# Patient Record
Sex: FEMALE | Race: BLACK OR AFRICAN AMERICAN | Employment: UNEMPLOYED | URBAN - METROPOLITAN AREA
[De-identification: names, ages, dates, MRNs, and addresses within clinical notes are randomized per-mention and may not be internally consistent; named-entity substitution may affect disease eponyms.]

---

## 2017-01-27 ENCOUNTER — ANESTHESIA EVENT (OUTPATIENT)
Dept: ENDOSCOPY | Age: 57
End: 2017-01-27
Payer: MEDICAID

## 2017-01-30 ENCOUNTER — ANESTHESIA (OUTPATIENT)
Dept: ENDOSCOPY | Age: 57
End: 2017-01-30
Payer: MEDICAID

## 2017-01-30 PROCEDURE — 74011250636 HC RX REV CODE- 250/636

## 2017-01-30 PROCEDURE — 74011000250 HC RX REV CODE- 250

## 2017-01-30 RX ORDER — SODIUM CHLORIDE, SODIUM LACTATE, POTASSIUM CHLORIDE, CALCIUM CHLORIDE 600; 310; 30; 20 MG/100ML; MG/100ML; MG/100ML; MG/100ML
INJECTION, SOLUTION INTRAVENOUS
Status: DISCONTINUED | OUTPATIENT
Start: 2017-01-30 | End: 2017-01-30 | Stop reason: HOSPADM

## 2017-01-30 RX ORDER — LIDOCAINE HYDROCHLORIDE 20 MG/ML
INJECTION, SOLUTION EPIDURAL; INFILTRATION; INTRACAUDAL; PERINEURAL AS NEEDED
Status: DISCONTINUED | OUTPATIENT
Start: 2017-01-30 | End: 2017-01-30 | Stop reason: HOSPADM

## 2017-01-30 RX ORDER — PROPOFOL 10 MG/ML
INJECTION, EMULSION INTRAVENOUS AS NEEDED
Status: DISCONTINUED | OUTPATIENT
Start: 2017-01-30 | End: 2017-01-30 | Stop reason: HOSPADM

## 2017-01-30 RX ADMIN — PROPOFOL 40 MG: 10 INJECTION, EMULSION INTRAVENOUS at 08:36

## 2017-01-30 RX ADMIN — PROPOFOL 30 MG: 10 INJECTION, EMULSION INTRAVENOUS at 08:39

## 2017-01-30 RX ADMIN — LIDOCAINE HYDROCHLORIDE 40 MG: 20 INJECTION, SOLUTION EPIDURAL; INFILTRATION; INTRACAUDAL; PERINEURAL at 08:32

## 2017-01-30 RX ADMIN — PROPOFOL 30 MG: 10 INJECTION, EMULSION INTRAVENOUS at 08:38

## 2017-01-30 RX ADMIN — PROPOFOL 50 MG: 10 INJECTION, EMULSION INTRAVENOUS at 08:35

## 2017-01-30 RX ADMIN — PROPOFOL 50 MG: 10 INJECTION, EMULSION INTRAVENOUS at 08:37

## 2017-01-30 RX ADMIN — SODIUM CHLORIDE, SODIUM LACTATE, POTASSIUM CHLORIDE, CALCIUM CHLORIDE: 600; 310; 30; 20 INJECTION, SOLUTION INTRAVENOUS at 08:30

## 2017-01-30 NOTE — ANESTHESIA POSTPROCEDURE EVALUATION
Post-Anesthesia Evaluation and Assessment    Patient: Remy Torres MRN: [id number]  SSN: [id number]    YOB: 1960  Age: 49 y.o. Sex: female      Data from PACU flowsheet    Cardiovascular Function/Vital Signs  Visit Vitals    /51    Pulse 99    Temp 36.3 °C (97.3 °F)    Resp 20    Ht 5' 4\" (1.626 m)    Wt 140.2 kg (309 lb)    SpO2 98%    Breastfeeding No    BMI 53.04 kg/m2       Patient is status post anesthesia    Nausea/Vomiting: controlled    Postoperative hydration reviewed and adequate. Pain:  Managed    Neurological Status: At baseline    Mental Status and Level of Consciousness: Alert and oriented     Pulmonary Status:   Adequate oxygenation and airway patent    Complications related to anesthesia: None    Post-anesthesia assessment completed.  No concerns    Signed By: Jael Franks CRNA     January 30, 2017

## 2017-01-30 NOTE — ANESTHESIA PREPROCEDURE EVALUATION
Anesthetic History   No history of anesthetic complications            Review of Systems / Medical History  Patient summary reviewed and pertinent labs reviewed    Pulmonary        Sleep apnea: No treatment           Neuro/Psych         Psychiatric history     Cardiovascular    Hypertension: well controlled              Exercise tolerance: >4 METS     GI/Hepatic/Renal           Liver disease (cirrhosis)     Endo/Other    Diabetes: type 2    Morbid obesity     Other Findings   Comments: Current Smoker? NO       Elective Surgery? Yes       Abstained from smoking 24 hours prior to anesthesia? N/A    Risk Factors for Postoperative nausea/vomiting:       History of postoperative nausea/vomiting? NO       Female? YES       Motion sickness? NO       Intended opioid administration for postoperative analgesia?   NO           Physical Exam    Airway  Mallampati: II  TM Distance: 4 - 6 cm  Neck ROM: normal range of motion   Mouth opening: Normal     Cardiovascular  Regular rate and rhythm,  S1 and S2 normal,  no murmur, click, rub, or gallop  Rhythm: regular  Rate: normal         Dental  No notable dental hx       Pulmonary  Breath sounds clear to auscultation               Abdominal  GI exam deferred       Other Findings            Anesthetic Plan    ASA: 3  Anesthesia type: general          Induction: Intravenous  Anesthetic plan and risks discussed with: Patient

## 2017-01-31 ENCOUNTER — OFFICE VISIT (OUTPATIENT)
Dept: HEMATOLOGY | Age: 57
End: 2017-01-31

## 2017-01-31 ENCOUNTER — HOSPITAL ENCOUNTER (OUTPATIENT)
Dept: LAB | Age: 57
Discharge: HOME | End: 2017-01-31

## 2017-01-31 VITALS
RESPIRATION RATE: 16 BRPM | SYSTOLIC BLOOD PRESSURE: 138 MMHG | BODY MASS INDEX: 50.02 KG/M2 | OXYGEN SATURATION: 99 % | HEART RATE: 97 BPM | HEIGHT: 64 IN | TEMPERATURE: 99.1 F | WEIGHT: 293 LBS | DIASTOLIC BLOOD PRESSURE: 77 MMHG

## 2017-01-31 DIAGNOSIS — K74.60 CIRRHOSIS OF LIVER WITHOUT ASCITES, UNSPECIFIED HEPATIC CIRRHOSIS TYPE (HCC): ICD-10-CM

## 2017-01-31 DIAGNOSIS — K74.60 CIRRHOSIS OF LIVER WITHOUT ASCITES, UNSPECIFIED HEPATIC CIRRHOSIS TYPE (HCC): Primary | ICD-10-CM

## 2017-01-31 PROBLEM — I10 HTN (HYPERTENSION): Status: ACTIVE | Noted: 2017-01-31

## 2017-01-31 PROBLEM — E66.9 OBESITY: Status: ACTIVE | Noted: 2017-01-31

## 2017-01-31 PROBLEM — E11.9 DIABETES MELLITUS TYPE 2, CONTROLLED (HCC): Status: ACTIVE | Noted: 2017-01-31

## 2017-01-31 LAB — AMMONIA PLAS-SCNC: 87 UMOL/L (ref 11–32)

## 2017-01-31 PROCEDURE — 82140 ASSAY OF AMMONIA: CPT | Performed by: INTERNAL MEDICINE

## 2017-01-31 PROCEDURE — 99001 SPECIMEN HANDLING PT-LAB: CPT | Performed by: INTERNAL MEDICINE

## 2017-01-31 NOTE — PROGRESS NOTES
2 Abeba Mart MD, TRICIA Littlejohn PA-C Howard Manual, MD, [address] [facility], [place] Gema Beatty NP Millicent Bussing, NP        [address]     [address] [place] [address], [address] [facility], [address]     [phone]     FAX: 411 [address], [address]     [place], [address] [place] [address] Street [address]     [phone]     FAX: [phone]           Patient Care Team:  Rand Trotter MD as PCP - General (Internal Medicine)  Brenda Frank MD (General Surgery)  Roya Way MD (Hematology and Oncology)      Problem List  Date Reviewed: 1/30/2017          Codes Class Noted    Diabetes mellitus type 2, controlled ([facility] ICD-10-CM: E11.9  ICD-9-CM: 250.00  1/31/2017        HTN (hypertension) ICD-10-CM: I10  ICD-9-CM: 401.9  1/31/2017        Obesity ICD-10-CM: E66.9  ICD-9-CM: 278.00  1/31/2017        HANKS (nonalcoholic steatohepatitis) ICD-10-CM: K75.81  ICD-9-CM: 571.8  9/25/2014        Candidal intertrigo ICD-10-CM: B37.2  ICD-9-CM: 112.3  7/28/2014        Acalculous cholecystitis ICD-10-CM: K81.9  ICD-9-CM: 575.10  2/27/2014        Cirrhosis ([facility] ICD-10-CM: K74.60  ICD-9-CM: 571.5  2/27/2014        Acquired absence of organ, genital organs ICD-10-CM: Z90.79  ICD-9-CM: V45.77  4/30/2012    Overview Signed 4/30/2012  2:32 PM by Delaney Sanford MD     [address] [facility] [place]  [address] [place] [address], [address] [place], [address] [facility]  [phone] Fax# [phone] Fax# [phone] Fax# [phone]  SURGICAL PATHOLOGY REPORT  Patient Name: Marcos Manzanares Specimen #: [id number]  Page 2 of [address] [place] [address] [facility] [address]  3.7 cm in width.  On the anterior and posterior upper uterine corpus and fundus is a 3.2 x 2.7 cm tan-yellow thickened  nodular area. Within the thickened area is one smooth-lined cyst filled with clear watery fluid measuring 0.7 cm in  diameter. The yellow thickened area grossly appears to be stromal rather than endometrial, involves approximately  20% of the uterine wall, and within 1.6 cm of the overlying serosal surface. Invasion cannot be grossly determined or  ruled out. The endometrium in the area of the thickening appears grossly attenuated. The remaining endometrium is  pink-tan, soft, and measures 0.1 to 0.2 cm in thickness. The myometrium is pink-tan, firm, slightly trabecular, and  measures up to 2.4 cm in thickness. No leiomyomas are seen. Representative sections are submitted. Block summary:  B1 anterior cervix  B2 posterior cervix  B3 anterior lower uterine corpus  B4 posterior lower uterine corpus  B5 lower anterior uterine corpus and surrounding lesion  B6 upper anterior uterine corpus, including lesion with cyst  B7 upper posterior uterine corpus  B8 upper posterior uterine corpus, including thickest area of lesion  (mh)  VIDAL/mj1  MICROSCOPIC DESCRIPTION:  A. Sections show some small fragments of benign endometrial tissue in a background of abundant blood. A few  small fragments of benign stromal tissue are included, together with fragments of benign squamous mucosa. There  are no specific features or evidence of malignancy. B. Sections show an infarcted leiomyoma, which appears to be located predominantly within the submucosal tissues,  extending to the endometrial surface. Noted within the surrounding myometrial tissues are numerous blood vessels  containing eosinophilic material within the lumens, this material being associated with a focal multinucleated giant  cell response. These findings may be consistent with prior embolization. Where preserved, the remaining  endometrium shows a weakly proliferative pattern.  Focal adenomyosis is also seen within the uterine wall. The             Bacterial vaginitis ICD-10-CM: N76.0, B96.89  ICD-9-CM: 616.10, 041.9  8/31/2011            The physicians listed above have asked me to see Jorje Delong in consultation regarding management of HANKS. MELD is 15 and her GI Dr. Melissa Luna asked her to be seen by us for consideration of OLT. Patient is morbidly obese. BMI is 53. She has b/l degenerative joint disease in both knees. She is not a knee replacement candidate due to thrombocytopenia and cirrhosis. She has limited mobility due to her knees and believes she would lose weight if her knees were improved. She does not take pain pills due to fear of harming her liver. She presents with her daughter today. All medical records sent by the referring physicians were reviewed including imaging studies . The patient is a 49 y.o. female who was first found to have chronic liver disease in 2011. She has biopsy proven HANKS . The patient has not developed any major complications of cirrhosis to date. She has a history of small varices, but last EGD from 30 Jan 2017 was negative per her report. The patient has not developed ascites. The patient has not developed edema. The patient has had falls and confusion, but does not know if this was due to hepatic encephalopathy. The patient notes fatigue, pain in the right side over the liver, and problems concentrating,     The patient has limitations in functional activities secondary to these symptoms. ALLERGIES  No Known Allergies    MEDICATIONS  Current Outpatient Prescriptions   Medication Sig    metFORMIN (GLUCOPHAGE) 500 mg tablet Take 500 mg by mouth two (2) times daily (with meals).  SITagliptin (JANUVIA) 100 mg tablet Take 100 mg by mouth daily.  lisinopril (PRINIVIL, ZESTRIL) 20 mg tablet Take 20 mg by mouth daily.     clotrimazole (LOTRIMIN) 1 % topical cream Apply a thin layer over affected area twice daily for up to 14 days.    gabapentin (NEURONTIN) 300 mg capsule Take 300 mg by mouth daily. No current facility-administered medications for this visit. SYSTEM REVIEW NOT RELATED TO LIVER DISEASE OR REVIEWED ABOVE:  Constitution systems: Negative for fever, chills, weight gain, weight loss. Eyes: Negative for visual changes. ENT: Negative for sore throat, painful swallowing. Respiratory: Negative for cough, hemoptysis, SOB. Cardiology: Negative for chest pain, palpitations. GI:  Negative for constipation or diarrhea. : Negative for urinary frequency, dysuria, hematuria, nocturia. Skin: Negative for rash. Hematology: Negative for easy bruising, blood clots. Musculo-skelatal: Negative for back pain, muscle pain, weakness. Neurologic: Negative for headaches, dizziness, vertigo, memory problems not related to HE. Psychology: Negative for anxiety, depression. FAMILY HISTORY:  There is no family history of liver disease. SOCIAL HISTORY:  The patient is . The patient has 6 children  The patient has never used tobacco products. The patient has never consumed significant amounts of alcohol. The patient currently receiving disability. PHYSICAL EXAMINATION:  Visit Vitals    /77 (BP 1 Location: Right arm, BP Patient Position: Sitting)    Pulse 97    Temp 99.1 °F (37.3 °C) (Tympanic)    Resp 16    Ht 5' 4\" (1.626 m)    Wt 309 lb (140.2 kg)    LMP 04/25/2011    SpO2 99%    BMI 53.04 kg/m2     General: No acute distress. Eyes: Sclera mildly icteric. ENT: No oral lesions. Thyroid normal.  Nodes: No adenopathy. Skin: + spider angiomata. No jaundice. No palmar erythema. Respiratory: Lungs clear to auscultation. Cardiovascular: Regular heart rate. No murmurs. No JVD. Abdomen: Obese. Soft non-tender. Liver size normal to percussion/palpation. Spleen not palpable. No obvious ascites. Extremities: Trace edema. No muscle wasting.   No gross arthritic changes. Neurologic: Alert and oriented. Cranial nerves grossly intact. Mild tremor with outstretched hands. LABORATORY STUDIES:  Liver [place] [facility] 1/30/2017 11/23/2016   WBC 4.6 - 13.2 K/uL 2.9 (L) 4.9   ANC 1.8 - 8.0 K/UL 1.4 (L) 3.3   HGB 12.0 - 16.0 g/dL 12.8 13.7    - 420 K/uL 41 (L) 41 (L)   INR 0.8 - 1.2       AST 15 - 37 U/L     ALT 13 - 56 U/L     Alk Phos 45 - 117 U/L     Bili, Total 0.2 - 1.0 MG/DL     Bili, Direct 0.0 - 0.2 MG/DL     Albumin 3.4 - 5.0 g/dL     BUN 7.0 - 18 MG/DL  5 (L)   Creat 0.6 - 1.3 MG/DL  0.79   Na 136 - 145 mmol/L  137   K 3.5 - 5.5 mmol/L  4.2   Cl 100 - 108 mmol/L  102   CO2 21 - 32 mmol/L  26   Glucose 74 - 99 mg/dL  363 (H)   Magnesium 1.8 - 2.4 mg/dL  1.9     SEROLOGIES:  Not available or performed. Testing will be performed as indicated. LIVER HISTOLOGY:  Not available or performed    ENDOSCOPIC PROCEDURES:  Not available or performed    RADIOLOGY:  11/2016 US  1. Increased echogenicity with nodular liver echotexture, compatible with  reported hepatic cirrhosis. No suspicious hepatic mass.     2. Cholelithiasis without evidence of acute cholecystitis.     Attending radiologist statement: I have personally reviewed the study and this  report, and concur with the above stated findings. OTHER TESTING:  Not available or performed    ASSESSMENT AND PLAN:    Cirrhosis secondary to HANKS. Compensated. MELD 15. The patient has depressed but stable liver function. The patient has never developed any complications of cirrhosis to date. The patient has significant symptoms from cirrhosis despite having a MELD score of 15. I am afraid her obesity may preclude her from liver transplant. I will discuss her case with transplant center. Perhaps there is a program she could be a part of to help her with aggressive weight loss.     Perhaps there is a way she could have orthopedic surgery in a transplant center environment, but I think the risks may be too great. 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 EGD is being performed on a regular basis by GI Dr. Mireille Lind. The patient was directed to continue all current medications at the current dosages. There are no contraindications for the patient to take any medications that are necessary for treatment of other medical issues. Thrombocytopenia secondary to cirrhosis. There is no evidence of overt bleeding. There is no indication for platelet transfusions or pharmacologic treatment to increase the platelet count. [address] screening has recently been performed and does not suggest [address] The next liver imaging study will be performed in May 2017. All of the above issues were discussed with the patient. All questions were answered. The patient expressed a clear understanding of the above. [address] [address] in 3 months.                 Andreia Sanchez MD  Liver [place] of [address], [address] [facility], [address]  [phone]  FAX: [phone]

## 2017-01-31 NOTE — MR AVS SNAPSHOT
Visit Information Date & Time Provider Department Dept. Phone Encounter #  
 1/31/2017  1:00 PM Mark Llanes MD [address] [id number] Your Appointments 5/1/2017  2:30 PM  
Follow Up with Michelle Valdivia NP Liver [place] [facility] [place] ([facility]) Appt Note: HANKS/Cirrhosis [address] Ruthanna Blade [address] [facility]  
  
   
 [address] [facility] Upcoming Health Maintenance Date Due Pneumococcal 19-64 Highest Risk (1 of 3 - PCV13) 10/25/1981 DTaP/Tdap/Td series (1 - Tdap) 10/25/1983 BREAST CANCER SCRN MAMMOGRAM 10/25/2012 FOBT Q 1 YEAR AGE 50-75 10/25/2012 PAP AKA CERVICAL CYTOLOGY 4/30/2015 INFLUENZA AGE 9 TO ADULT 8/1/2016 Allergies as of 1/31/2017  Review Complete On: 1/31/2017 By: Nish Rodriguez No Known Allergies Current Immunizations  Never Reviewed No immunizations on file. Not reviewed this visit You Were Diagnosed With   
  
 Codes Comments Cirrhosis of liver without ascites, unspecified hepatic cirrhosis type ([facility]    -  Primary ICD-10-CM: K74.60 ICD-9-CM: 571.5 Vitals BP Pulse Temp Resp Height(growth percentile) Weight(growth percentile) 138/77 (BP 1 Location: Right arm, BP Patient Position: Sitting) 97 99.1 °F (37.3 °C) (Tympanic) 16 5' 4\" (1.626 m) 309 lb (140.2 kg) LMP SpO2 BMI OB Status Smoking Status 04/25/2011 99% 53.04 kg/m2 Hysterectomy Never Smoker Vitals History BMI and BSA Data Body Mass Index Body Surface Area 53.04 kg/m 2 2.52 m 2 Your Updated Medication List  
  
   
This list is accurate as of: 1/31/17  2:13 PM.  Always use your most recent med list.  
  
  
  
  
 clotrimazole 1 % topical cream  
Commonly known as:  Danielle Alina Apply a thin layer over affected area twice daily for up to 14 days. gabapentin 300 mg capsule Commonly known as:  NEURONTIN Take 300 mg by mouth daily. JANUVIA 100 mg tablet Generic drug:  SITagliptin Take 100 mg by mouth daily. lisinopril 20 mg tablet Commonly known as:  Donnald Code Take 20 mg by mouth daily. metFORMIN 500 mg tablet Commonly known as:  GLUCOPHAGE Take 500 mg by mouth two (2) times daily (with meals). To-Do List   
 01/31/2017 Lab:  AFP WITH AFP-L3% 01/31/2017 Lab:  AMMONIA   
  
 01/31/2017 Lab:  CBC WITH AUTOMATED DIFF   
  
 01/31/2017 Lab:  HEPATIC FUNCTION PANEL   
  
 01/31/2017 Lab:  METABOLIC PANEL, BASIC   
  
 01/31/2017 Lab:  PROTHROMBIN TIME + INR Introducing [facility]! Lisa Adkins introduces [employer]plane patient portal. Now you can access parts of your medical record, email your doctor's office, and request medication refills online. 1. In your internet browser, go to [email or web address] 2. Click on the First Time User? Click Here link in the Sign In box. You will see the New Member Sign Up page. 3. Enter your [employer]plane Access Code exactly as it appears below. You will not need to use this code after youve completed the sign-up process. If you do not sign up before the expiration date, you must request a new code. · [employer] Air[employer] Access Code: [email or web address] Expires: 2/20/2017 12:20 PM 
 
4. Enter the last four digits of your Social Security Number (xxxx) and Date of Birth (mm/dd/yyyy) as indicated and click Submit. You will be taken to the next sign-up page. 5. Create a Project Airplane ID. This will be your [employer] Airplane login ID and cannot be changed, so think of one that is secure and easy to remember. 6. Create a [employer] password. You can change your password at any time. 7. Enter your Password Reset Question and Answer. This can be used at a later time if you forget your password. 8. Enter your e-mail address.  You will receive e-mail notification when new information is available in marinanow. 9. Click Sign Up. You can now view and download portions of your medical record. 10. Click the Download Summary menu link to download a portable copy of your medical information. If you have questions, please visit the Frequently Asked Questions section of the marinanow website. Remember, marinanow is NOT to be used for urgent needs. For medical emergencies, dial 911. Now available from your iPhone and Android! Please provide this summary of care documentation to your next provider. Your primary care clinician is listed as Margo Rayo. If you have any questions after today's visit, please call [phone].

## 2017-02-01 LAB
AFP L3 MFR SERPL: 8.9 % (ref 0–9.9)
AFP SERPL-MCNC: 3.4 NG/ML (ref 0–8)
ALBUMIN SERPL-MCNC: 3.1 G/DL (ref 3.5–5.5)
ALP SERPL-CCNC: 123 IU/L (ref 39–117)
ALT SERPL-CCNC: 25 IU/L (ref 0–32)
AMMONIA PLAS-MCNC: NORMAL UG/DL (ref 27–102)
AST SERPL-CCNC: 48 IU/L (ref 0–40)
BASOPHILS # BLD AUTO: 0 X10E3/UL (ref 0–0.2)
BASOPHILS NFR BLD AUTO: 1 %
BILIRUB DIRECT SERPL-MCNC: 1.01 MG/DL (ref 0–0.4)
BILIRUB SERPL-MCNC: 3.9 MG/DL (ref 0–1.2)
BUN SERPL-MCNC: 5 MG/DL (ref 6–24)
BUN/CREAT SERPL: 11 (ref 9–23)
CALCIUM SERPL-MCNC: 8.6 MG/DL (ref 8.7–10.2)
CHLORIDE SERPL-SCNC: 101 MMOL/L (ref 96–106)
CO2 SERPL-SCNC: 23 MMOL/L (ref 18–29)
CREAT SERPL-MCNC: 0.45 MG/DL (ref 0.57–1)
EOSINOPHIL # BLD AUTO: 0.1 X10E3/UL (ref 0–0.4)
EOSINOPHIL NFR BLD AUTO: 3 %
ERYTHROCYTE [DISTWIDTH] IN BLOOD BY AUTOMATED COUNT: 16.7 % (ref 12.3–15.4)
GLUCOSE SERPL-MCNC: 226 MG/DL (ref 65–99)
HCT VFR BLD AUTO: 41 % (ref 34–46.6)
HGB BLD-MCNC: 13.9 G/DL (ref 11.1–15.9)
IMM GRANULOCYTES # BLD: 0 X10E3/UL (ref 0–0.1)
IMM GRANULOCYTES NFR BLD: 0 %
INR PPP: 1.4 (ref 0.8–1.2)
LYMPHOCYTES # BLD AUTO: 0.9 X10E3/UL (ref 0.7–3.1)
LYMPHOCYTES NFR BLD AUTO: 28 %
MCH RBC QN AUTO: 34.8 PG (ref 26.6–33)
MCHC RBC AUTO-ENTMCNC: 33.9 G/DL (ref 31.5–35.7)
MCV RBC AUTO: 103 FL (ref 79–97)
MONOCYTES # BLD AUTO: 0.3 X10E3/UL (ref 0.1–0.9)
MONOCYTES NFR BLD AUTO: 10 %
MORPHOLOGY BLD-IMP: ABNORMAL
NEUTROPHILS # BLD AUTO: 1.9 X10E3/UL (ref 1.4–7)
NEUTROPHILS NFR BLD AUTO: 58 %
PLATELET # BLD AUTO: 54 X10E3/UL (ref 150–379)
POTASSIUM SERPL-SCNC: 4.3 MMOL/L (ref 3.5–5.2)
PROT SERPL-MCNC: 6.7 G/DL (ref 6–8.5)
PROTHROMBIN TIME: 14 SEC (ref 9.1–12)
RBC # BLD AUTO: 3.99 X10E6/UL (ref 3.77–5.28)
SODIUM SERPL-SCNC: 138 MMOL/L (ref 134–144)
WBC # BLD AUTO: 3.2 X10E3/UL (ref 3.4–10.8)

## 2017-02-01 RX ORDER — LACTULOSE 10 G/15ML
10 SOLUTION ORAL; RECTAL
Qty: 480 ML | Refills: 3 | Status: SHIPPED | OUTPATIENT
Start: 2017-02-01 | End: 2017-05-23

## 2017-02-02 DIAGNOSIS — E66.01 MORBID OBESITY, UNSPECIFIED OBESITY TYPE (HCC): Primary | ICD-10-CM

## 2017-02-07 ENCOUNTER — TELEPHONE (OUTPATIENT)
Dept: HEMATOLOGY | Age: 57
End: 2017-02-07

## 2017-02-07 NOTE — TELEPHONE ENCOUNTER
Pt would like to know if you spoke w/ weight loss surgeon? She mentioned something about a committee, I'm not really sure she seem a bit confused.

## 2017-05-19 ENCOUNTER — APPOINTMENT (OUTPATIENT)
Dept: GENERAL RADIOLOGY | Age: 57
DRG: 279 | End: 2017-05-19
Attending: EMERGENCY MEDICINE
Payer: MEDICAID

## 2017-05-19 ENCOUNTER — APPOINTMENT (OUTPATIENT)
Dept: CT IMAGING | Age: 57
DRG: 279 | End: 2017-05-19
Attending: EMERGENCY MEDICINE
Payer: MEDICAID

## 2017-05-19 ENCOUNTER — HOSPITAL ENCOUNTER (INPATIENT)
Age: 57
LOS: 4 days | Discharge: HOME | DRG: 279 | End: 2017-05-23
Attending: EMERGENCY MEDICINE | Admitting: HOSPITALIST
Payer: MEDICAID

## 2017-05-19 DIAGNOSIS — E11.8 CONTROLLED TYPE 2 DIABETES MELLITUS WITH COMPLICATION, WITHOUT LONG-TERM CURRENT USE OF INSULIN (HCC): ICD-10-CM

## 2017-05-19 DIAGNOSIS — R74.01 TRANSAMINITIS: ICD-10-CM

## 2017-05-19 DIAGNOSIS — R18.8 CIRRHOSIS OF LIVER WITH ASCITES, UNSPECIFIED HEPATIC CIRRHOSIS TYPE (HCC): ICD-10-CM

## 2017-05-19 DIAGNOSIS — E83.42 HYPOMAGNESEMIA: ICD-10-CM

## 2017-05-19 DIAGNOSIS — N30.01 ACUTE CYSTITIS WITH HEMATURIA: ICD-10-CM

## 2017-05-19 DIAGNOSIS — K74.60 CIRRHOSIS OF LIVER WITH ASCITES, UNSPECIFIED HEPATIC CIRRHOSIS TYPE (HCC): ICD-10-CM

## 2017-05-19 DIAGNOSIS — D64.9 ANEMIA, UNSPECIFIED TYPE: ICD-10-CM

## 2017-05-19 DIAGNOSIS — K75.81 NASH (NONALCOHOLIC STEATOHEPATITIS): Primary | ICD-10-CM

## 2017-05-19 PROBLEM — K72.10 END STAGE LIVER DISEASE (HCC): Status: ACTIVE | Noted: 2017-05-19

## 2017-05-19 PROBLEM — R73.9 HYPERGLYCEMIA: Status: ACTIVE | Noted: 2017-05-19

## 2017-05-19 LAB
ALBUMIN SERPL BCP-MCNC: 2.2 G/DL (ref 3.4–5)
ALBUMIN/GLOB SERPL: 0.5 {RATIO} (ref 0.8–1.7)
ALP SERPL-CCNC: 142 U/L (ref 45–117)
ALT SERPL-CCNC: 27 U/L (ref 13–56)
ANION GAP BLD CALC-SCNC: 11 MMOL/L (ref 3–18)
APPEARANCE UR: CLEAR
APTT PPP: 32.2 SEC (ref 23–36.4)
AST SERPL W P-5'-P-CCNC: 46 U/L (ref 15–37)
BACTERIA URNS QL MICRO: ABNORMAL /HPF
BASOPHILS # BLD AUTO: 0 K/UL (ref 0–0.06)
BASOPHILS # BLD: 0 % (ref 0–2)
BILIRUB SERPL-MCNC: 6.6 MG/DL (ref 0.2–1)
BILIRUB UR QL: ABNORMAL
BUN SERPL-MCNC: 4 MG/DL (ref 7–18)
BUN/CREAT SERPL: 4 (ref 12–20)
CALCIUM SERPL-MCNC: 8.3 MG/DL (ref 8.5–10.1)
CHLORIDE SERPL-SCNC: 94 MMOL/L (ref 100–108)
CK MB CFR SERPL CALC: 1.5 % (ref 0–4)
CK MB SERPL-MCNC: 1.5 NG/ML (ref 5–25)
CK SERPL-CCNC: 100 U/L (ref 26–192)
CO2 SERPL-SCNC: 27 MMOL/L (ref 21–32)
COLOR UR: ABNORMAL
CREAT SERPL-MCNC: 1 MG/DL (ref 0.6–1.3)
DIFFERENTIAL METHOD BLD: ABNORMAL
EOSINOPHIL # BLD: 0.1 K/UL (ref 0–0.4)
EOSINOPHIL NFR BLD: 2 % (ref 0–5)
EPITH CASTS URNS QL MICRO: ABNORMAL /LPF (ref 0–5)
ERYTHROCYTE [DISTWIDTH] IN BLOOD BY AUTOMATED COUNT: 15.4 % (ref 11.6–14.5)
EST. AVERAGE GLUCOSE BLD GHB EST-MCNC: 192 MG/DL
GLOBULIN SER CALC-MCNC: 4.8 G/DL (ref 2–4)
GLUCOSE SERPL-MCNC: 411 MG/DL (ref 74–99)
GLUCOSE UR STRIP.AUTO-MCNC: >1000 MG/DL
HBA1C MFR BLD: 8.3 % (ref 4.2–5.6)
HCT VFR BLD AUTO: 32.5 % (ref 35–45)
HGB BLD-MCNC: 11.3 G/DL (ref 12–16)
HGB UR QL STRIP: ABNORMAL
INR PPP: 1.6 (ref 0.8–1.2)
KETONES UR QL STRIP.AUTO: NEGATIVE MG/DL
LEUKOCYTE ESTERASE UR QL STRIP.AUTO: ABNORMAL
LIPASE SERPL-CCNC: 371 U/L (ref 73–393)
LYMPHOCYTES # BLD AUTO: 19 % (ref 21–52)
LYMPHOCYTES # BLD: 1 K/UL (ref 0.9–3.6)
MAGNESIUM SERPL-MCNC: 1.5 MG/DL (ref 1.6–2.6)
MCH RBC QN AUTO: 36.5 PG (ref 24–34)
MCHC RBC AUTO-ENTMCNC: 34.8 G/DL (ref 31–37)
MCV RBC AUTO: 104.8 FL (ref 74–97)
MONOCYTES # BLD: 0.7 K/UL (ref 0.05–1.2)
MONOCYTES NFR BLD AUTO: 13 % (ref 3–10)
NEUTS SEG # BLD: 3.6 K/UL (ref 1.8–8)
NEUTS SEG NFR BLD AUTO: 66 % (ref 40–73)
NITRITE UR QL STRIP.AUTO: NEGATIVE
PH UR STRIP: 5.5 [PH] (ref 5–8)
PLATELET # BLD AUTO: 52 K/UL (ref 135–420)
PMV BLD AUTO: 9.4 FL (ref 9.2–11.8)
POTASSIUM SERPL-SCNC: 3.7 MMOL/L (ref 3.5–5.5)
PROT SERPL-MCNC: 7 G/DL (ref 6.4–8.2)
PROT UR STRIP-MCNC: ABNORMAL MG/DL
PROTHROMBIN TIME: 18.1 SEC (ref 11.5–15.2)
RBC # BLD AUTO: 3.1 M/UL (ref 4.2–5.3)
RBC #/AREA URNS HPF: ABNORMAL /HPF (ref 0–5)
SODIUM SERPL-SCNC: 132 MMOL/L (ref 136–145)
SP GR UR REFRACTOMETRY: >1.03 (ref 1–1.03)
TROPONIN I SERPL-MCNC: 0.02 NG/ML (ref 0–0.04)
UROBILINOGEN UR QL STRIP.AUTO: 4 EU/DL (ref 0.2–1)
WBC # BLD AUTO: 5.4 K/UL (ref 4.6–13.2)
WBC URNS QL MICRO: ABNORMAL /HPF (ref 0–4)

## 2017-05-19 PROCEDURE — 96365 THER/PROPH/DIAG IV INF INIT: CPT

## 2017-05-19 PROCEDURE — 65270000029 HC RM PRIVATE

## 2017-05-19 PROCEDURE — 74177 CT ABD & PELVIS W/CONTRAST: CPT

## 2017-05-19 PROCEDURE — 99285 EMERGENCY DEPT VISIT HI MDM: CPT

## 2017-05-19 PROCEDURE — P9047 ALBUMIN (HUMAN), 25%, 50ML: HCPCS | Performed by: INTERNAL MEDICINE

## 2017-05-19 PROCEDURE — 83735 ASSAY OF MAGNESIUM: CPT | Performed by: EMERGENCY MEDICINE

## 2017-05-19 PROCEDURE — 85610 PROTHROMBIN TIME: CPT | Performed by: EMERGENCY MEDICINE

## 2017-05-19 PROCEDURE — 74011250637 HC RX REV CODE- 250/637: Performed by: EMERGENCY MEDICINE

## 2017-05-19 PROCEDURE — 83036 HEMOGLOBIN GLYCOSYLATED A1C: CPT | Performed by: HOSPITALIST

## 2017-05-19 PROCEDURE — 83690 ASSAY OF LIPASE: CPT | Performed by: EMERGENCY MEDICINE

## 2017-05-19 PROCEDURE — 74011636637 HC RX REV CODE- 636/637: Performed by: EMERGENCY MEDICINE

## 2017-05-19 PROCEDURE — 74011250636 HC RX REV CODE- 250/636: Performed by: INTERNAL MEDICINE

## 2017-05-19 PROCEDURE — 74011636320 HC RX REV CODE- 636/320: Performed by: EMERGENCY MEDICINE

## 2017-05-19 PROCEDURE — 85730 THROMBOPLASTIN TIME PARTIAL: CPT | Performed by: EMERGENCY MEDICINE

## 2017-05-19 PROCEDURE — 74011250636 HC RX REV CODE- 250/636: Performed by: HOSPITALIST

## 2017-05-19 PROCEDURE — 96375 TX/PRO/DX INJ NEW DRUG ADDON: CPT

## 2017-05-19 PROCEDURE — 81001 URINALYSIS AUTO W/SCOPE: CPT | Performed by: EMERGENCY MEDICINE

## 2017-05-19 PROCEDURE — 82550 ASSAY OF CK (CPK): CPT | Performed by: EMERGENCY MEDICINE

## 2017-05-19 PROCEDURE — 80053 COMPREHEN METABOLIC PANEL: CPT | Performed by: EMERGENCY MEDICINE

## 2017-05-19 PROCEDURE — 71010 XR CHEST PORT: CPT

## 2017-05-19 PROCEDURE — 85025 COMPLETE CBC W/AUTO DIFF WBC: CPT | Performed by: EMERGENCY MEDICINE

## 2017-05-19 PROCEDURE — 93005 ELECTROCARDIOGRAM TRACING: CPT

## 2017-05-19 RX ORDER — MAGNESIUM SULFATE 100 %
4 CRYSTALS MISCELLANEOUS AS NEEDED
Status: DISCONTINUED | OUTPATIENT
Start: 2017-05-19 | End: 2017-05-23 | Stop reason: HOSPADM

## 2017-05-19 RX ORDER — ALBUMIN HUMAN 250 G/1000ML
50 SOLUTION INTRAVENOUS ONCE
Status: COMPLETED | OUTPATIENT
Start: 2017-05-19 | End: 2017-05-19

## 2017-05-19 RX ORDER — GABAPENTIN 300 MG/1
300 CAPSULE ORAL DAILY
Status: DISCONTINUED | OUTPATIENT
Start: 2017-05-20 | End: 2017-05-23 | Stop reason: HOSPADM

## 2017-05-19 RX ORDER — INSULIN LISPRO 100 [IU]/ML
INJECTION, SOLUTION INTRAVENOUS; SUBCUTANEOUS
Status: DISCONTINUED | OUTPATIENT
Start: 2017-05-19 | End: 2017-05-20

## 2017-05-19 RX ORDER — METOPROLOL TARTRATE 5 MG/5ML
2.5 INJECTION INTRAVENOUS ONCE
Status: DISCONTINUED | OUTPATIENT
Start: 2017-05-19 | End: 2017-05-19

## 2017-05-19 RX ORDER — DEXTROSE MONOHYDRATE 25 G/50ML
25-50 INJECTION, SOLUTION INTRAVENOUS AS NEEDED
Status: DISCONTINUED | OUTPATIENT
Start: 2017-05-19 | End: 2017-05-23 | Stop reason: HOSPADM

## 2017-05-19 RX ORDER — ONDANSETRON 2 MG/ML
6 INJECTION INTRAMUSCULAR; INTRAVENOUS
Status: DISCONTINUED | OUTPATIENT
Start: 2017-05-19 | End: 2017-05-23 | Stop reason: HOSPADM

## 2017-05-19 RX ORDER — LANOLIN ALCOHOL/MO/W.PET/CERES
400 CREAM (GRAM) TOPICAL
Status: COMPLETED | OUTPATIENT
Start: 2017-05-19 | End: 2017-05-19

## 2017-05-19 RX ORDER — MORPHINE SULFATE 2 MG/ML
2 INJECTION, SOLUTION INTRAMUSCULAR; INTRAVENOUS ONCE
Status: COMPLETED | OUTPATIENT
Start: 2017-05-19 | End: 2017-05-19

## 2017-05-19 RX ORDER — SODIUM CHLORIDE 0.9 % (FLUSH) 0.9 %
5-10 SYRINGE (ML) INJECTION AS NEEDED
Status: DISCONTINUED | OUTPATIENT
Start: 2017-05-19 | End: 2017-05-23 | Stop reason: HOSPADM

## 2017-05-19 RX ORDER — LISINOPRIL 20 MG/1
20 TABLET ORAL DAILY
Status: DISCONTINUED | OUTPATIENT
Start: 2017-05-20 | End: 2017-05-23 | Stop reason: HOSPADM

## 2017-05-19 RX ADMIN — ALBUMIN (HUMAN) 25 G: 0.25 INJECTION, SOLUTION INTRAVENOUS at 20:07

## 2017-05-19 RX ADMIN — Medication 400 MG: at 16:16

## 2017-05-19 RX ADMIN — MORPHINE SULFATE 2 MG: 2 INJECTION, SOLUTION INTRAMUSCULAR; INTRAVENOUS at 21:06

## 2017-05-19 RX ADMIN — INSULIN HUMAN 6 UNITS: 100 INJECTION, SOLUTION PARENTERAL at 15:22

## 2017-05-19 RX ADMIN — IOPAMIDOL 90 ML: 612 INJECTION, SOLUTION INTRAVENOUS at 15:34

## 2017-05-19 RX ADMIN — ALBUMIN (HUMAN) 25 G: 0.25 INJECTION, SOLUTION INTRAVENOUS at 19:07

## 2017-05-19 NOTE — ED NOTES
Purposeful rounding completed:    Side rails up x 2:  YES  Bed in low position and wheels locked: YES  Call bell within reach: YES  Comfort addressed: YES    Toileting needs addressed: YES  Plan of care reviewed/updated with patient and or family members: YES  IV site assessed: YES  Pain assessed and addressed: YES, 6    Patient provided with meal box at this time per verbal okay by Dr. Saranya Awad

## 2017-05-19 NOTE — ED TRIAGE NOTES
Pt reports dizziness since this AM AND bleeding ( currently controlled) from the umbilicus. H/o cirrhosis.

## 2017-05-19 NOTE — ED NOTES
The Sepsis Screening has been completed on arrival in the Emergency Department. Vital signs:  Patient Vitals for the past 4 hrs:   Temp Pulse Resp BP SpO2   05/19/17 1615 - - - 115/44 -   05/19/17 1548 - - - - 100 %   05/19/17 1520 - - - - 100 %   05/19/17 1518 - - - 109/65 -   05/19/17 1416 - - - - 100 %   05/19/17 1415 - - - 106/74 -   05/19/17 1329 98.9 °F (37.2 °C) (!) 111 16 147/51 99 %       MAP (Monitor): (!) 57    =monitored (data validate)  MAP (Calculated): 83    =calculated (manual entry)    Is patient is 31y/o or older, meets 2 or more ABNORMAL VITAL SIGNS below, associated with SUSPECTED INFECTION?   NO  Dr. Miriam Tinsley notified about patients VS. Patient has end stage liver disease with no suspected infection     Temperature < 96.8°F (36°C) or > 100.9°F (38.3°C)   Heart Rate > 90 beats per minute   Respiratory Rate > 20 beats per minute   BP < 90 systolic    MAP < 65    IF ANSWER IS YES, CALL A CODE SEPSIS  POC LACTIC ACID ASAP AND BEGIN NURSE DRIVEN SEPSIS ORDERS WHILE AWAITING PROVIDER

## 2017-05-19 NOTE — IP AVS SNAPSHOT
[address] [place] [address] 
[phone] Patient: Teofilo Mendes MRN: [id number] :1960 You are allergic to the following No active allergies Recent Documentation Height Weight BMI OB Status Smoking Status 1.626 m 134.3 kg 50.81 kg/m2 Hysterectomy Never Smoker Emergency Contacts Name Discharge Info Relation Home Work Mobile Roseanne Figueroa DISCHARGE CAREGIVER [3] Daughter [21]   [phone] [address]  Child [2] [phone] About your hospitalization You were admitted on:  May 19, 2017 You last received care in the:  [facility] 2 NEURO MED You were discharged on:  May 23, 2017 Unit phone number:  [phone] Why you were hospitalized Your primary diagnosis was:  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) Your diagnoses also included:  Hyperglycemia, Ascites, End Stage Liver Disease (Hcc), Diabetes Mellitus Type 2, Controlled (Hcc), Htn (Hypertension), Obesity Providers Seen During Your Hospitalizations Provider Role Specialty Primary office phone Jessica Chambers MD Attending Provider Emergency Medicine [phone] Angeles Cobos MD Attending Provider [facility] [phone] Katy Sun MD Attending Provider Internal Medicine [phone] Torito Munoz MD Attending Provider Internal Medicine [phone] Your Primary Care Physician (PCP) Primary Care Physician Office Phone Office Fax Leah Montague [phone] Follow-up Information Follow up With Details Comments Contact Info Kevin Hook MD In 1 week  [address] [place] [address] [address] [phone] Marcy Saunders MD In 1 week Gastrointerology will arrange appointment with Dr. Chyna Arnold [facility] [address] [facility] [address] [phone] Current Discharge Medication List  
  
START taking these medications Dose & Instructions Dispensing Information Comments Morning Noon Evening Bedtime  
 potassium chloride SR 10 mEq tablet Commonly known as:  KLOR-CON 10 Your last dose was: Your next dose is:    
   
   
 Dose:  10 mEq Take 1 Tab by mouth daily. Quantity:  30 Tab Refills:  0  
     
   
   
   
  
 spironolactone 50 mg tablet Commonly known as:  ALDACTONE Your last dose was: Your next dose is:    
   
   
 Dose:  50 mg Take 1 Tab by mouth daily. Quantity:  30 Tab Refills:  0 CONTINUE these medications which have CHANGED Dose & Instructions Dispensing Information Comments Morning Noon Evening Bedtime  
 lactulose 10 gram/15 mL solution Commonly known as:  Sugar Vicky What changed:   
- medication strength 
- how much to take - when to take this Your last dose was: Your next dose is:    
   
   
 Dose:  20 g Take 30 mL by mouth three (3) times daily. Quantity:  1 Bottle Refills:  2 CONTINUE these medications which have NOT CHANGED Dose & Instructions Dispensing Information Comments Morning Noon Evening Bedtime  
 clotrimazole 1 % topical cream  
Commonly known as:  Jael Ort Your last dose was: Your next dose is:    
   
   
 Apply a thin layer over affected area twice daily for up to 14 days. Quantity:  15 g Refills:  2  
     
   
   
   
  
 gabapentin 300 mg capsule Commonly known as:  NEURONTIN Your last dose was: Your next dose is:    
   
   
 Dose:  300 mg Take 300 mg by mouth daily. Refills:  0 JANUVIA 100 mg tablet Generic drug:  SITagliptin Your last dose was: Your next dose is:    
   
   
 Dose:  100 mg Take 100 mg by mouth daily. Refills:  0  
     
   
   
   
  
 lisinopril 20 mg tablet Commonly known as:  Bigg Bold Your last dose was: Your next dose is:    
   
   
 Dose:  20 mg Take 20 mg by mouth daily. Refills:  0  
     
   
   
   
  
 metFORMIN 500 mg tablet Commonly known as:  GLUCOPHAGE Your last dose was: Your next dose is:    
   
   
 Dose:  500 mg Take 500 mg by mouth two (2) times daily (with meals). Refills:  0 Where to Get Your Medications Information on where to get these meds will be given to you by the nurse or doctor. ! Ask your nurse or doctor about these medications  
  lactulose 10 gram/15 mL solution  
 potassium chloride SR 10 mEq tablet  
 spironolactone 50 mg tablet Discharge Instructions DISCHARGE SUMMARY from Nurse The following personal items are in your possession at time of discharge: 
 
Dental Appliances: None Visual Aid: None Home Medications: None Jewelry: Sent home Clothing: Dress, Undergarments Other Valuables: Cell Phone, Graciela PATIENT INSTRUCTIONS: 
 
 
F-face looks uneven A-arms unable to move or move unevenly S-speech slurred or non-existent T-time-call 911 as soon as signs and symptoms begin-DO NOT go Back to bed or wait to see if you get better-TIME IS BRAIN. Warning Signs of HEART ATTACK Call 911 if you have these symptoms: 
? Chest discomfort.  Most heart attacks involve discomfort in the center of the chest that lasts more than a few minutes, or that goes away and comes back. It can feel like uncomfortable pressure, squeezing, fullness, or pain. ? Discomfort in other areas of the upper body. Symptoms can include pain or discomfort in one or both arms, the back, neck, jaw, or stomach. ? Shortness of breath with or without chest discomfort. ? Other signs may include breaking out in a cold sweat, nausea, or lightheadedness. Don't wait more than five minutes to call [address]! Fast action can save your life. Calling 911 is almost always the fastest way to get lifesaving treatment. Emergency Medical Services staff can begin treatment when they arrive  up to an hour sooner than if someone gets to the hospital by car. The discharge information has been reviewed with the patient. The patient verbalized understanding. Discharge medications reviewed with the patient and appropriate educational materials and side effects teaching were provided. Diabetes Foot Health: Care Instructions Your Care Instructions When you have diabetes, your feet need extra care and attention. Diabetes can damage the nerve endings and blood vessels in your feet, making you less likely to notice when your feet are injured. Diabetes also limits your body's ability to fight infection and get blood to areas that need it. If you get a minor foot injury, it could become an ulcer or a serious infection. With good foot care, you can prevent most of these problems. Caring for your feet can be quick and easy. Most of the care can be done when you are bathing or getting ready for bed. Follow-up care is a key part of your treatment and safety. Be sure to make and go to all appointments, and call your doctor if you are having problems. Its also a good idea to know your test results and keep a list of the medicines you take. How can you care for yourself at home?  
· Keep your blood sugar close to normal by watching what and how much you eat, monitoring blood sugar, taking medicines if prescribed, and getting regular exercise. · Do not smoke. Smoking affects blood flow and can make foot problems worse. If you need help quitting, talk to your doctor about stop-smoking programs and medicines. These can increase your chances of quitting for good. · Eat a diet that is low in fats. High fat intake can cause fat to build up in your blood vessels and decrease blood flow. · Inspect your feet daily for blisters, cuts, cracks, or sores. If you cannot see well, use a mirror or have someone help you. · Take care of your feet: 
[facility] AUTHORITY your feet every day. Use warm (not hot) water. Check the water temperature with your wrists or other part of your body, not your feet. ¨ Dry your feet well. Pat them dry. Do not rub the skin on your feet too hard. Dry well between your toes. If the skin on your feet stays moist, bacteria or a fungus can grow, which can lead to infection. ¨ Keep your skin soft. Use moisturizing skin cream to keep the skin on your feet soft and prevent calluses and cracks. But do not put the cream between your toes, and stop using any cream that causes a rash. ¨ Clean underneath your toenails carefully. Do not use a sharp object to clean underneath your toenails. Use the blunt end of a nail file or other rounded tool. ¨ Trim and file your toenails straight across to prevent ingrown toenails. Use a nail clipper, not scissors. Use an emery board to smooth the edges. · Change socks daily. Socks without seams are best, because seams often rub the feet. You can find socks for people with diabetes from specialty catalogs. · Look inside your shoes every day for things like gravel or torn linings, which could cause blisters or sores. · Buy shoes that fit well: 
¨ Look for shoes that have plenty of space around the toes. This helps prevent bunions and blisters. ¨ Try on shoes while wearing the kind of socks you will usually wear with the shoes. ¨ Avoid plastic shoes. They may rub your feet and cause blisters. Good shoes should be made of materials that are flexible and breathable, such as leather or cloth. ¨ Break in new shoes slowly by wearing them for no more than an hour a day for several days. Take extra time to check your feet for red areas, blisters, or other problems after you wear new shoes. · Do not go barefoot. Do not wear sandals, and do not wear shoes with very thin soles. Thin soles are easy to puncture. They also do not protect your feet from hot pavement or cold weather. · Have your doctor check your feet during each visit. If you have a foot problem, see your doctor. Do not try to treat an early foot problem at home. Home remedies or treatments that you can buy without a prescription (such as corn removers) can be harmful. · Always get early treatment for foot problems. A minor irritation can lead to a major problem if not properly cared for early. When should you call for help? Call your doctor now or seek immediate medical care if: 
· You have a foot sore, an ulcer or break in the skin that is not healing after 4 days, bleeding corns or calluses, or an ingrown toenail. · You have blue or black areas, which can mean bruising or blood flow problems. · You have peeling skin or tiny blisters between your toes or cracking or oozing of the skin. · You have a fever for more than 24 hours and a foot sore. · You have new numbness or tingling in your feet that does not go away after you move your feet or change positions. · You have unexplained or unusual swelling of the foot or ankle. Watch closely for changes in your health, and be sure to contact your doctor if: 
· You cannot do proper foot care. Where can you learn more? Go to http://adela-santhosh.info/. Enter A739 in the search box to learn more about \"Diabetes Foot Health: Care Instructions. \" Current as of: May 23, 2016 Content Version: 11.2 © 3585-6979 Healthwise, Incorporated. Care instructions adapted under license by [employer] (which disclaims liability or warranty for this information). If you have questions about a medical condition or this instruction, always ask your healthcare professional. [address] any warranty or liability for your use of this information. Diabetes and Preventing Falls: Care Instructions Your Care Instructions If you are an older adult who has diabetes, you may have a higher risk of falling. Complications of diabetessuch as nerve damage, foot problems, and reduced visionmay increase your risk of a fall. Some of your medicines also may add to your risk. By making your home safer, you can lower your risk of falling. Doing things to prevent diabetes complications may also help to lower your risk. You can make your home safer with a few simple measures. Follow-up care is a key part of your treatment and safety. Be sure to make and go to all appointments, and call your doctor if you are having problems. It's also a good idea to know your test results and keep a list of the medicines you take. How can you care for yourself at home? Taking care of yourself · Keep your blood sugar at a target level (which you set with your doctor). · Exercise regularly to improve your strength, muscle tone, and balance. Walk if you can. Swimming may be a good choice if you cannot walk easily. · Have your vision checked as often as your doctor recommends. It is usually once a year or more often if you have eye problems. · Know the side effects of the medicines you take. Ask your doctor or pharmacist whether the medicines you take can affect your balance. Sleeping pills or sedatives can affect your balance. · Limit the amount of alcohol you drink. Alcohol can impair your balance and other senses. · Have your doctor check your feet during each visit.  If you have a foot problem, see your doctor. Preventing falls at home · Remove raised doorway thresholds, throw rugs, and clutter. Repair loose carpet or raised areas in the floor. · Move furniture and electrical cords to keep them out of walking paths. · Use nonskid floor wax, and wipe up spills right away, especially on ceramic tile floors. · If you use a walker or cane, put rubber tips on it. If you use crutches, clean the bottoms of them regularly with an abrasive pad, such as steel wool. · Keep your house well lit, especially Graciella Sandifer, and outside walkways. Use night-lights in areas such as hallways and bathrooms. Add extra light switches or use remote switches (such as switches that go on or off when you clap your hands) to make it easier to turn lights on if you have to get up during the night. · Install sturdy handrails on stairways. Put grab bars near your shower, bathtub, and toilet. · Store household items on low shelves so that you do not have to climb or reach high. Or use a reaching device that you can get at a medical supply store. If you have to climb for something, use a step stool with handrails, or ask someone to get it for you. · Keep a cordless phone and a flashlight with new batteries by your bed. If possible, put a phone in each of the main rooms of your house, or carry a cell phone in case you fall and cannot reach a phone. Or you can wear a device around your neck or wrist. You push a button that sends a signal for help. · Wear low-heeled shoes that fit well and give your feet good support. Use footwear with nonskid soles. Check the heels and soles of your shoes for wear. Repair or replace worn heels or soles. · Do not wear socks without shoes on wood floors. · Walk on the grass when the sidewalks are slippery. If you live in an area that gets snow and ice in the winter, sprinkle salt on slippery steps and sidewalks. Where can you learn more? Go to http://adela-santhosh.info/. Enter O995 in the search box to learn more about \"Diabetes and Preventing Falls: Care Instructions. \" Current as of: August 4, 2016 Content Version: 11.2 © 0242-2777 "[employer]". Care instructions adapted under license by [employer] (which disclaims liability or warranty for this information). If you have questions about a medical condition or this instruction, always ask your healthcare professional. [address] any warranty or liability for your use of this information. High Blood Pressure: Care Instructions Your Care Instructions If your blood pressure is usually above 140/90, you have high blood pressure, or hypertension. That means the top number is 140 or higher or the bottom number is 90 or higher, or both. Despite what a lot of people think, high blood pressure usually doesn't cause headaches or make you feel dizzy or lightheaded. It usually has no symptoms. But it does increase your risk for heart attack, stroke, and kidney or eye damage. The higher your blood pressure, the more your risk increases. Your doctor will give you a goal for your blood pressure. Your goal will be based on your health and your age. An example of a goal is to keep your blood pressure below 140/90. Lifestyle changes, such as eating healthy and being active, are always important to help lower blood pressure. You might also take medicine to reach your blood pressure goal. 
Follow-up care is a key part of your treatment and safety. Be sure to make and go to all appointments, and call your doctor if you are having problems. It's also a good idea to know your test results and keep a list of the medicines you take. How can you care for yourself at home? Medical treatment · If you stop taking your medicine, your blood pressure will go back up. You may take one or more types of medicine to lower your blood pressure. Be safe with medicines. Take your medicine exactly as prescribed. Call your doctor if you think you are having a problem with your medicine. · Talk to your doctor before you start taking aspirin every day. Aspirin can help certain people lower their risk of a heart attack or stroke. But taking aspirin isn't right for everyone, because it can cause serious bleeding. · See your doctor regularly. You may need to see the doctor more often at first or until your blood pressure comes down. · If you are taking blood pressure medicine, talk to your doctor before you take decongestants or anti-inflammatory medicine, such as ibuprofen. Some of these medicines can raise blood pressure. · Learn how to check your blood pressure at home. Lifestyle changes · Stay at a healthy weight. This is especially important if you put on weight around the waist. Losing even 10 pounds can help you lower your blood pressure. · If your doctor recommends it, get more exercise. Walking is a good choice. Bit by bit, increase the amount you walk every day. Try for at least 30 minutes on most days of the week. You also may want to swim, bike, or do other activities. · Avoid or limit alcohol. Talk to your doctor about whether you can drink any alcohol. · Try to limit how much sodium you eat to less than 2,300 milligrams (mg) a day. Your doctor may ask you to try to eat less than 1,500 mg a day. · Eat plenty of fruits (such as bananas and oranges), vegetables, legumes, whole grains, and low-fat dairy products. · Lower the amount of saturated fat in your diet. Saturated fat is found in animal products such as milk, cheese, and meat. Limiting these foods may help you lose weight and also lower your risk for heart disease. · Do not smoke. Smoking increases your risk for heart attack and stroke. If you need help quitting, talk to your doctor about stop-smoking programs and medicines. These can increase your chances of quitting for good. When should you call for help? Call 911 anytime you think you may need emergency care. This may mean having symptoms that suggest that your blood pressure is causing a serious heart or blood vessel problem. Your blood pressure may be over 180/110. For example, call 911 if: 
· You have symptoms of a heart attack. These may include: ¨ Chest pain or pressure, or a strange feeling in the chest. 
¨ Sweating. ¨ Shortness of breath. ¨ Nausea or vomiting. ¨ Pain, pressure, or a strange feeling in the back, neck, jaw, or upper belly or in one or both shoulders or arms. ¨ Lightheadedness or sudden weakness. ¨ A fast or irregular heartbeat. · You have symptoms of a stroke. These may include: 
¨ Sudden numbness, tingling, weakness, or loss of movement in your face, arm, or leg, especially on only one side of your body. ¨ Sudden vision changes. ¨ Sudden trouble speaking. ¨ Sudden confusion or trouble understanding simple statements. ¨ Sudden problems with walking or balance. ¨ A sudden, severe headache that is different from past headaches. · You have severe back or belly pain. Do not wait until your blood pressure comes down on its own. Get help right away. Call your doctor now or seek immediate care if: 
· Your blood pressure is much higher than normal (such as 180/110 or higher), but you don't have symptoms. · You think high blood pressure is causing symptoms, such as: ¨ Severe headache. ¨ Blurry vision. Watch closely for changes in your health, and be sure to contact your doctor if: 
· Your blood pressure measures 140/90 or higher at least 2 times. That means the top number is 140 or higher or the bottom number is 90 or higher, or both. · You think you may be having side effects from your blood pressure medicine. · Your blood pressure is usually normal, but it goes above normal at least 2 times. Where can you learn more? Go to http://adela-santhosh.info/. Enter R198 in the search box to learn more about \"High Blood Pressure: Care Instructions. \" Current as of: August 8, 2016 Content Version: 11.2 © 3638-9938 [employer]. Care instructions adapted under license by [employer] (which disclaims liability or warranty for this information). If you have questions about a medical condition or this instruction, always ask your healthcare professional. [address] any warranty or liability for your use of this information. Learning About High Blood Sugar What is high blood sugar? Your body turns the food you eat into glucose (sugar), which it uses for energy. But if your body isn't able to use the sugar right away, it can build up in your blood and lead to high blood sugar. When the amount of sugar in your blood stays too high for too much of the time, you may have diabetes. Diabetes is a disease that can cause serious health problems. The good news is that lifestyle changes may help you get your blood sugar back to normal and avoid or delay diabetes. What causes high blood sugar? Sugar (glucose) can build up in your blood if you: · Are overweight. · Have a family history of diabetes. · Take certain medicines, such as steroids. What are the symptoms? Having high blood sugar may not cause any symptoms at all. Or it may make you feel very thirsty or very hungry. You may also urinate more often than usual, have blurry vision, or lose weight without trying. How is high blood sugar treated? You can take steps to lower your blood sugar level if you understand what makes it get higher. Your doctor may want you to learn how to test your blood sugar level at home. Then you can see how illness, stress, or different kinds of food or medicine raise or lower your blood sugar level. Other tests may be needed to see if you have diabetes. How can you prevent high blood sugar? · Watch your weight. If you're overweight, losing just a small amount of weight may help. Reducing fat around your waist is most important. · Limit the amount of calories, sweets, and unhealthy fat you eat. Ask your doctor if a dietitian can help you. A registered dietitian can help you create meal plans that fit your lifestyle. · Get at least 30 minutes of exercise on most days of the week. Exercise helps control your blood sugar. It also helps you maintain a healthy weight. Walking is a good choice. You also may want to do other activities, such as running, swimming, cycling, or playing tennis or team sports. · If your doctor prescribed medicines, take them exactly as prescribed. Call your doctor if you think you are having a problem with your medicine. You will get more details on the specific medicines your doctor prescribes. Follow-up care is a key part of your treatment and safety. Be sure to make and go to all appointments, and call your doctor if you are having problems. It's also a good idea to know your test results and keep a list of the medicines you take. Where can you learn more? Go to http://adelaOfferLoungesanthosh.info/. Enter O108 in the search box to learn more about \"Learning About High Blood Sugar. \" Current as of: May 23, 2016 Content Version: 11.2 © 2632-3271 [employer], Incorporated. Care instructions adapted under license by [employer] (which disclaims liability or warranty for this information). If you have questions about a medical condition or this instruction, always ask your healthcare professional. [place] [address] any warranty or liability for your use of this information. Patient armband removed and shredded. Discharge Orders None [employer] Announcement We are excited to announce that we are making your provider's discharge notes available to you in [employer].   You will see these notes when they are completed and signed by the physician that discharged you from your recent hospital stay. If you have any questions or concerns about any information you see in [employer], please call the Health Information Department where you were seen or reach out to your Primary Care Provider for more information about your plan of care. Introducing \[facility] [place] [facility]\"" & HEALTH SERVICES! Graham Frost introduces [facility] patient portal. Now you can access parts of your medical record, email your doctor's office, and request medication refills online. 1. In your internet browser, go to [email or web address] 2. Click on the First Time User? Click Here link in the Sign In box. You will see the New Member Sign Up page. 3. Enter your [facility] Access Code exactly as it appears below. You will not need to use this code after youve completed the sign-up process. If you do not sign up before the expiration date, you must request a new code. · [facility] Access Code: [email or web address] Expires: 8/15/2017  8:08 AM 
 
4. Enter the last four digits of your Social Security Number (xxxx) and Date of Birth (mm/dd/yyyy) as indicated and click Submit. You will be taken to the next sign-up page. 5. Create a [facility] ID. This will be your [facility] login ID and cannot be changed, so think of one that is secure and easy to remember. 6. Create a [facility] password. You can change your password at any time. 7. Enter your Password Reset Question and Answer. This can be used at a later time if you forget your password. 8. Enter your e-mail address. You will receive e-mail notification when new information is available in [address] 9. Click Sign Up. You can now view and download portions of your medical record. 10. Click the Download Summary menu link to download a portable copy of your medical information.  
 
If you have questions, please visit the Frequently Asked Questions section of the [employer]. Remember, MyChart is NOT to be used for urgent needs. For medical emergencies, dial 911. Now available from your iPhone and Android! General Information Please provide this summary of care documentation to your next provider. Patient Signature:  ____________________________________________________________ Date:  ____________________________________________________________  
  
[facility] Provider Signature:  ____________________________________________________________ Date:  ____________________________________________________________

## 2017-05-19 NOTE — ED NOTES
Bedside and Verbal shift change report given to Tamika Mccurdy Rn (oncoming nurse) by Zenobia Peoples RN (offgoing nurse). Report included the following information SBAR. Medications and patient status.

## 2017-05-19 NOTE — ED TRIAGE NOTES
Patient states she is here for \"everything is wrong\". Patient states pain everywhere, bleeding from her navel, cirrhosis \"symptoms\" and generalized weakness and dizziness.   Patient states she needs to get a liver transplant but does not currently have a hepatologist.

## 2017-05-19 NOTE — CONSULTS
Gastroenterology Consult    Patient: Crystal Rabago MRN: [id number]  SSN: [id number]    YOB: 1960  Age: 49 y.o. Sex: female        Assessment:   -HANKS Cirrhosis, new decompensation with development of ascites; MELD 18  -Anasarca  -Intra-abdominal varices, no esophagea/gastric varices on EGD Jan2017  -Anemia, thrombocytopenia  -Mild BAYRON (creat 1 from recent baseline 0.45-0.69)  -Hyperglycemia  -Tachycardia  -Report of mild confusion, no overt encephalopathy on exam  -Report of intermittent bleeding from umbilicus; resolved    50 y/o F with subacute decompensation of HANKS cirrhosis with development of ascites, LE edema, anasarca on CT scan. Despite clear extravascular volume overload, she is likely intravascular volume depleted with mild tachycardia, bump in creatinine. Hyperglycemia may be contributing to weakness, mild confusion, and vascular volume depletion. She is not thriving at home and being admitted to better manage her volume status and glucose as she needs volume expansion and likely need initiation of diuretics as well once intravascular replete. No acute GI bleeding. Plan:   -50 grams of 25% albumin  -Diagnostic paracentesis tomorrow if able to find a good pocket; send cell count, GS/CX, Albumin, protein, cytology  -Management of hyperglycemia per hospitalist team; gentle IV fluids  -Recheck labwork (CBC, CMP) tomorrow  -Low sodium (<2000mg/24hr) diet  -Hold on lactulose and diuretics for now  -Will follow    Subjective:      Crystal Rabago is a 49 y.o. female who is being seen for cirrhosis. She is a patient of Dr Niles Ahmadi, recently seen in the office 5/16. Has known HANKS cirrhosis with recent MELD around 15. Was recently seen by transplant hepatology (Dr Andrew Lira) although her full transplant evaluation has not been initiated at this point.  She is here for subacute worsening of weakness, poor PO intake, total body pain, feeling that she's dehydrated despite leg swelling and feeling that she's not thriving at home. She recently had labwork with PCM and then with Dr Zhane Gordon on 5/16 showing mild increase in Bili from mid-3's to 5.9. Her glucose is noted to be 419 with creat 0.69, INR 1.3, Hb 12.4 on those labs. In the ER here, her bili is 6.6 with INR 1.6, Creat 1, Hb 11.3, glucose 411. CT scan was obtained showing cirrhosis, intra-abdominal varices, moderate ascites, anasarca. No hepatic mass. AFP was recently normal. She has no prior history of ascites and has not been on diuretics. Her last EGD in Jan2017 showed portal gastropathy but no esophageal or gastric varices. She has complained of some difficulties with memory and concentration without overt encephalopathy. She notes that she hasn't been regularly checking her glucose and doesn't always take her oral hypoglycemics. She has some intermittent loose gray stool without melena or hematochezia. Some nausea but no vomiting. Does complain of postprandial fullness. Also notes that she's had intermittent bleeding from her umbilicus over the past couple months, last was 1-2 days ago and has since stopped. She doesn't recall scratching or injuring the area. She has pain all over her body which keeps her up at night but doesn't specifically have abdominal pain. No fever.      Past Medical History  Past Medical History:   Diagnosis Date    Bilateral knee pain     Cirrhosis of liver ([address]     Depression     Diabetes ([facility] [address]     Diastolic hypertension     Fatty liver     Gastritis     Hypertension     Ill-defined condition     Inflammation of lymphatics     Liver disease     cirrhosis    Menorrhagia     HANKS (nonalcoholic steatohepatitis)     Nausea     Osteoarthritis     Right flank pain     RUQ abdominal pain     Sleep apnea     Does not use consistently    Thrombocytopenia (HCC)        Past Surgical History  Past Surgical History:   Procedure Laterality Date    ENDOSCOPY, COLON, DIAGNOSTIC  5/20/2013    HX HYSTERECTOMY      HX OTHER SURGICAL      liver biopsy    HX OVARIAN CYST REMOVAL      Bilateral       Family History  History reviewed. No pertinent family history. Social History  Social History     Social History    Marital status:      Spouse name: N/A    Number of children: N/A    Years of education: N/A     Occupational History    Not on file. Social History Main Topics    Smoking status: Never Smoker    Smokeless tobacco: Never Used    Alcohol use No    Drug use: No    Sexual activity: Yes     Partners: Male     Birth control/ protection: None     Other Topics Concern    Blood Transfusions Yes     prior to hysterectomy    Occupational Exposure No    Hobby Hazards No    Stress Concern No    Weight Concern Yes    Exercise No    Seat Belt Yes    Self-Exams No     Social History Narrative         Medications  Current Facility-Administered Medications   Medication Dose Route Frequency    sodium chloride (NS) flush 5-10 mL  5-10 mL IntraVENous PRN     Current Outpatient Prescriptions   Medication Sig    lactulose (CHRONULAC) 10 gram/15 mL solution Take 15 mL by mouth nightly.  metFORMIN (GLUCOPHAGE) 500 mg tablet Take 500 mg by mouth two (2) times daily (with meals).  SITagliptin (JANUVIA) 100 mg tablet Take 100 mg by mouth daily.  lisinopril (PRINIVIL, ZESTRIL) 20 mg tablet Take 20 mg by mouth daily.  clotrimazole (LOTRIMIN) 1 % topical cream Apply a thin layer over affected area twice daily for up to 14 days.  gabapentin (NEURONTIN) 300 mg capsule Take 300 mg by mouth daily. Hospital Problems  Date Reviewed: 1/31/2017    None        No Known Allergies    Review of Systems:  Pertinent items are noted in the History of Present Illness.     Objective:     Physical Exam:  Visit Vitals    /56    Pulse (!) 110    Temp 98.9 °F (37.2 °C)    Resp 16    Ht 5' 4\" (1.626 m)    Wt 134.3 kg (296 lb)    LMP 04/25/2011    SpO2 100%    BMI 50.81 kg/m2     General appearance: alert, cooperative, no distress, appears stated age; chronically ill appearing  Head: Normocephalic, without obvious abnormality, atraumatic  Eyes: Mild icterus, no pallor  Throat: Lips, mucosa, and tongue normal. Teeth and gums normal  Neck: supple, symmetrical, trachea midline, no adenopathy, thyroid: not enlarged, symmetric, no tenderness/mass/nodules  Lungs: clear to auscultation bilaterally  Heart: regular rate and rhythm, S1, S2 normal, no murmur, click, rub or gallop  Abdomen: morbidly obese, soft, non-tender.  Exam for ascites limited by habitus, Bowel sounds normal. No masses,  no organomegaly appreciated, no bleeding at umbilicus, no visible vessel or varix at umbilicus  Extremities: 1+ ankle and lower leg edema bilaterally, atraumatic, no cyanosis or edema  Skin: Skin color, texture, turgor normal. No rashes or lesions  Neurologic: Grossly normal; alert and oriented, no asterixis, no overt encephalopathy    Recent Results (from the past 24 hour(s))   EKG, 12 LEAD, INITIAL    Collection Time: 05/19/17  2:10 PM   Result Value Ref Range    Ventricular Rate 110 BPM    Atrial Rate 110 BPM    P-R Interval 150 ms    QRS Duration 98 ms    Q-T Interval 378 ms    QTC Calculation (Bezet) 511 ms    Calculated P Axis 57 degrees    Calculated R Axis 3 degrees    Calculated T Axis 55 degrees    Diagnosis       Sinus tachycardia  Cannot rule out Anterior infarct (cited on or before 19-MAY-2017)  Abnormal ECG  When compared with ECG of 24-AUG-2016 11:39,  No significant change was found     CBC WITH AUTOMATED DIFF    Collection Time: 05/19/17  2:36 PM   Result Value Ref Range    WBC 5.4 4.6 - 13.2 K/uL    RBC 3.10 (L) 4.20 - 5.30 M/uL    HGB 11.3 (L) 12.0 - 16.0 g/dL    HCT 32.5 (L) 35.0 - 45.0 %    .8 (H) 74.0 - 97.0 FL    MCH 36.5 (H) 24.0 - 34.0 PG    MCHC 34.8 31.0 - 37.0 g/dL    RDW 15.4 (H) 11.6 - 14.5 %    PLATELET 52 (L) 167 - 420 K/uL    MPV 9.4 9.2 - 11.8 FL    NEUTROPHILS 66 40 - 73 %    LYMPHOCYTES 19 (L) 21 - 52 %    MONOCYTES 13 (H) 3 - 10 %    EOSINOPHILS 2 0 - 5 %    BASOPHILS 0 0 - 2 %    ABS. NEUTROPHILS 3.6 1.8 - 8.0 K/UL    ABS. LYMPHOCYTES 1.0 0.9 - 3.6 K/UL    ABS. MONOCYTES 0.7 0.05 - 1.2 K/UL    ABS. EOSINOPHILS 0.1 0.0 - 0.4 K/UL    ABS. BASOPHILS 0.0 0.0 - 0.06 K/UL    DF AUTOMATED     CARDIAC PANEL,(CK, CKMB & TROPONIN)    Collection Time: 05/19/17  2:36 PM   Result Value Ref Range     26 - 192 U/L    CK - MB 1.5 <3.6 ng/ml    CK-MB Index 1.5 0.0 - 4.0 %    Troponin-I, Qt. 0.02 0.0 - 6.723 NG/ML   METABOLIC PANEL, COMPREHENSIVE    Collection Time: 05/19/17  2:36 PM   Result Value Ref Range    Sodium 132 (L) 136 - 145 mmol/L    Potassium 3.7 3.5 - 5.5 mmol/L    Chloride 94 (L) 100 - 108 mmol/L    CO2 27 21 - 32 mmol/L    Anion gap 11 3.0 - 18 mmol/L    Glucose 411 (HH) 74 - 99 mg/dL    BUN 4 (L) 7.0 - 18 MG/DL    Creatinine 1.00 0.6 - 1.3 MG/DL    BUN/Creatinine ratio 4 (L) 12 - 20      GFR est AA >60 >60 ml/min/1.73m2    GFR est non-AA 58 (L) >60 ml/min/1.73m2    Calcium 8.3 (L) 8.5 - 10.1 MG/DL    Bilirubin, total 6.6 (H) 0.2 - 1.0 MG/DL    ALT (SGPT) 27 13 - 56 U/L    AST (SGOT) 46 (H) 15 - 37 U/L    Alk.  phosphatase 142 (H) 45 - 117 U/L    Protein, total 7.0 6.4 - 8.2 g/dL    Albumin 2.2 (L) 3.4 - 5.0 g/dL    Globulin 4.8 (H) 2.0 - 4.0 g/dL    A-G Ratio 0.5 (L) 0.8 - 1.7     PROTHROMBIN TIME + INR    Collection Time: 05/19/17  2:36 PM   Result Value Ref Range    Prothrombin time 18.1 (H) 11.5 - 15.2 sec    INR 1.6 (H) 0.8 - 1.2     PTT    Collection Time: 05/19/17  2:36 PM   Result Value Ref Range    aPTT 32.2 23.0 - 36.4 SEC   MAGNESIUM    Collection Time: 05/19/17  2:36 PM   Result Value Ref Range    Magnesium 1.5 (L) 1.6 - 2.6 mg/dL   LIPASE    Collection Time: 05/19/17  2:36 PM   Result Value Ref Range    Lipase 371 73 - 393 U/L   URINALYSIS W/ RFLX MICROSCOPIC    Collection Time: 05/19/17  2:43 PM   Result Value Ref Range    Color DARK YELLOW      Appearance CLEAR      Specific gravity >1.030 (H) 1.005 - 1.030    pH (UA) 5.5 5.0 - 8.0      Protein TRACE (A) NEG mg/dL    Glucose >1000 (A) NEG mg/dL    Ketone NEGATIVE  NEG mg/dL    Bilirubin SMALL (A) NEG      Blood MODERATE (A) NEG      Urobilinogen 4.0 (H) 0.2 - 1.0 EU/dL    Nitrites NEGATIVE  NEG      Leukocyte Esterase SMALL (A) NEG     URINE MICROSCOPIC ONLY    Collection Time: 05/19/17  2:43 PM   Result Value Ref Range    WBC TOO NUMEROUS TO COUNT 0 - 4 /hpf    RBC 4 to 10 0 - 5 /hpf    Epithelial cells 2+ 0 - 5 /lpf    Bacteria 2+ (A) NEG /hpf     CT per HPI    Signed By: Salomón Awad MD     May 19, 2017

## 2017-05-19 NOTE — ED PROVIDER NOTES
HPI Comments: 2:09 PM Dino Yoo is a 49 y.o. female with history of HTN, cirrhosis of the liver, and liver disease who presents to the ED c/o body aches. Pt also complains of dizziness and bleeding from navel. Pt states, \"everything is wrong. \" Pt states she had labs done with her PCP at [facility] and was told they were abnormal. Pt states her GI doctor is Dr. Tristian Pham. Pt states she attempted to get a hepatologist. Pt denies any other symptoms at this time. PCP: Eduardo Rueda MD      The history is provided by the patient. Past Medical History:   Diagnosis Date    Bilateral knee pain     Cirrhosis of liver (HCC)     Depression     Diabetes ([facility] [address]     Diastolic hypertension     Fatty liver     Gastritis     Hypertension     Ill-defined condition     Inflammation of lymphatics     Liver disease     cirrhosis    Menorrhagia     HANKS (nonalcoholic steatohepatitis)     Nausea     Osteoarthritis     Right flank pain     RUQ abdominal pain     Sleep apnea     Does not use consistently    Thrombocytopenia (HCC)        Past Surgical History:   Procedure Laterality Date    ENDOSCOPY, COLON, DIAGNOSTIC  5/20/2013    HX HYSTERECTOMY      HX OTHER SURGICAL      liver biopsy    HX OVARIAN CYST REMOVAL      Bilateral         History reviewed. No pertinent family history. Social History     Social History    Marital status:      Spouse name: N/A    Number of children: N/A    Years of education: N/A     Occupational History    Not on file.      Social History Main Topics    Smoking status: Never Smoker    Smokeless tobacco: Never Used    Alcohol use No    Drug use: No    Sexual activity: Yes     Partners: Male     Birth control/ protection: None     Other Topics Concern    Blood Transfusions Yes     prior to hysterectomy    Occupational Exposure No    Hobby Hazards No    Stress Concern No    Weight Concern Yes    Exercise No    Seat Belt Yes    Self-Exams No     Social History Narrative         ALLERGIES: Review of patient's allergies indicates no known allergies. Review of Systems   Constitutional: Negative. HENT: Negative. Eyes: Negative. Respiratory: Negative. Cardiovascular: Negative. Gastrointestinal:        Navel bleeding   Endocrine: Negative. Genitourinary: Negative. Musculoskeletal: Positive for myalgias (body aches). Skin: Negative. Allergic/Immunologic: Negative. Neurological: Positive for dizziness. Hematological: Negative. Psychiatric/Behavioral: Negative. All other systems reviewed and are negative. Vitals:    05/20/17 0603 05/20/17 0730 05/20/17 1530 05/20/17 1859   BP: 128/77  135/81 120/63   Pulse: (!) 108  (!) 115 (!) 109   Resp: 18  18 18   Temp: 98.4 °F (36.9 °C)  98.1 °F (36.7 °C) 98.5 °F (36.9 °C)   SpO2: 95% 95% 92% 99%   Weight:       Height:                Physical Exam   Constitutional: She is oriented to person, place, and time. She appears well-developed and well-nourished. HENT:   Head: Normocephalic and atraumatic. Eyes: Conjunctivae and EOM are normal. Pupils are equal, round, and reactive to light. 3mm     Neck: Normal range of motion. Neck supple. Cardiovascular: Regular rhythm, normal heart sounds and intact distal pulses. Tachycardia present. Pulses:       Radial pulses are 2+ on the right side, and 2+ on the left side. Dorsalis pedis pulses are 2+ on the right side, and 2+ on the left side. Cap refill >2sec   Pulmonary/Chest: Effort normal and breath sounds normal.   Abdominal: Soft. Bowel sounds are normal. She exhibits distension. Musculoskeletal: Normal range of motion. Pedal edema 2+. Lower extremity edema 2+   Neurological: She is alert and oriented to person, place, and time. Skin: Skin is warm and dry. Psychiatric: She has a normal mood and affect. Her behavior is normal. Judgment and thought content normal.   Nursing note and vitals reviewed.        [facility]  ED Course Procedures   Vitals:  Patient Vitals for the past 12 hrs:   Temp Pulse Resp BP SpO2   05/20/17 1859 98.5 °F (36.9 °C) (!) 109 18 120/63 99 %   05/20/17 1530 98.1 °F (36.7 °C) (!) 115 18 135/81 92 %   05/20/17 0730 - - - - 95 %        Medications ordered:   Medications   sodium chloride (NS) flush 5-10 mL (not administered)   lactulose (CHRONULAC) solution 10 g (10 g Oral Refused 5/20/17 0046)   lisinopril (PRINIVIL, ZESTRIL) tablet 20 mg (20 mg Oral Given 5/20/17 1019)   gabapentin (NEURONTIN) capsule 300 mg (300 mg Oral Given 5/20/17 1019)   glucose chewable tablet 16 g (not administered)   glucagon (GLUCAGEN) injection 1 mg (not administered)   dextrose (D50) infusion 12.5-25 g (not administered)   ondansetron (ZOFRAN) injection 6 mg (not administered)   benzonatate (TESSALON) capsule 100 mg (100 mg Oral Given 5/20/17 1657)   morphine injection 2 mg (2 mg IntraVENous Given 5/20/17 1018)   cefTRIAXone (ROCEPHIN) 1 g in 0.9% sodium chloride (MBP/ADV) 50 mL MBP (1 g IntraVENous New Bag 5/20/17 1617)   insulin detemir (LEVEMIR) injection 7 Units (7 Units SubCUTAneous Given 5/20/17 1617)   insulin lispro (HUMALOG) injection (not administered)   insulin regular (NOVOLIN R, HUMULIN R) injection 6 Units (6 Units IntraVENous Given 5/19/17 1522)   iopamidol (ISOVUE 300) 61 % contrast injection 100 mL (90 mL IntraVENous Given 5/19/17 1534)   magnesium oxide (MAG-OX) tablet 400 mg (400 mg Oral Given 5/19/17 1616)   albumin human 25% (BUMINATE) solution 50 g (0 g IntraVENous IV Completed 5/19/17 2107)   morphine injection 2 mg (2 mg IntraVENous Given 5/19/17 2106)         Lab findings:  Recent Results (from the past 12 hour(s))   METABOLIC PANEL, COMPREHENSIVE    Collection Time: 05/20/17  8:05 AM   Result Value Ref Range    Sodium 136 136 - 145 mmol/L    Potassium 3.5 3.5 - 5.5 mmol/L    Chloride 98 (L) 100 - 108 mmol/L    CO2 31 21 - 32 mmol/L    Anion gap 7 3.0 - 18 mmol/L    Glucose 305 (H) 74 - 99 mg/dL    BUN 6 (L) 7.0 - 18 MG/DL    Creatinine 0.71 0.6 - 1.3 MG/DL    BUN/Creatinine ratio 8 (L) 12 - 20      GFR est AA >60 >60 ml/min/1.73m2    GFR est non-AA >60 >60 ml/min/1.73m2    Calcium 8.4 (L) 8.5 - 10.1 MG/DL    Bilirubin, total 7.4 (H) 0.2 - 1.0 MG/DL    ALT (SGPT) 22 13 - 56 U/L    AST (SGOT) 39 (H) 15 - 37 U/L    Alk. phosphatase 113 45 - 117 U/L    Protein, total 6.4 6.4 - 8.2 g/dL    Albumin 2.5 (L) 3.4 - 5.0 g/dL    Globulin 3.9 2.0 - 4.0 g/dL    A-G Ratio 0.6 (L) 0.8 - 1.7     CBC WITH AUTOMATED DIFF    Collection Time: 05/20/17  8:05 AM   Result Value Ref Range    WBC 5.2 4.6 - 13.2 K/uL    RBC 3.04 (L) 4.20 - 5.30 M/uL    HGB 11.1 (L) 12.0 - 16.0 g/dL    HCT 32.2 (L) 35.0 - 45.0 %    .9 (H) 74.0 - 97.0 FL    MCH 36.5 (H) 24.0 - 34.0 PG    MCHC 34.5 31.0 - 37.0 g/dL    RDW 15.8 (H) 11.6 - 14.5 %    PLATELET 61 (L) 887 - 420 K/uL    MPV 9.6 9.2 - 11.8 FL    NEUTROPHILS 66 40 - 73 %    LYMPHOCYTES 17 (L) 21 - 52 %    MONOCYTES 14 (H) 3 - 10 %    EOSINOPHILS 3 0 - 5 %    BASOPHILS 0 0 - 2 %    ABS. NEUTROPHILS 3.4 1.8 - 8.0 K/UL    ABS. LYMPHOCYTES 0.9 0.9 - 3.6 K/UL    ABS. MONOCYTES 0.7 0.05 - 1.2 K/UL    ABS. EOSINOPHILS 0.2 0.0 - 0.4 K/UL    ABS. BASOPHILS 0.0 0.0 - 0.06 K/UL    DF AUTOMATED     GLUCOSE, POC    Collection Time: 05/20/17  8:42 AM   Result Value Ref Range    Glucose (POC) 306 (H) 70 - 110 mg/dL   GLUCOSE, POC    Collection Time: 05/20/17 12:10 PM   Result Value Ref Range    Glucose (POC) 253 (H) 70 - 110 mg/dL   GLUCOSE, POC    Collection Time: 05/20/17  5:15 PM   Result Value Ref Range    Glucose (POC) 279 (H) 70 - 110 mg/dL       EKG interpretation by ED Physician:   2:11 PM: Rate 110. UT interval 150. QRS duration 98. QTc 511. Sinus tachycardia. X-Ray, CT or other radiology findings or impressions:  CT ABD PELV W CONT   Final Result   IMPRESSION:        1. Grossly cirrhotic liver. Cholelithiasis.     2.  Extensive varices throughout the abdomen and pelvis and additional esophageal  varices. Spontaneous splenorenal shunt.     3. Marked splenomegaly.     4. Ascites in the perihepatic, perisplenic and pelvic areas. Anasarca. Hysterectomy. Per radiologist   XR CHEST PORT   Final Result   IMPRESSION:     Mild cardiomegaly and mild pulmonary venous hypertension but no radiographic  evidence of acute cardiopulmonary process. No change since prior study of  2/11/2014. Per radiologist       Orders:  Orders Placed This Encounter    ABDOMINAL PARACENTESIS INITIAL     Standing Status:   Standing     Number of Occurrences:   1    CULTURE, BODY FLUID W GRAM STAIN     Standing Status:   Standing     Number of Occurrences:   1    CULTURE, URINE     Standing Status:   Standing     Number of Occurrences:   1     Order Specific Question:   Reason for Culture     Answer:   Eval of sepsis without a clear source    XR CHEST PORT     Standing Status:   Standing     Number of Occurrences:   1     Order Specific Question:   Reason for Exam     Answer:   abd pain    CT ABD PELV W CONT     Standing Status:   Standing     Number of Occurrences:   1     Order Specific Question:   Transport     Answer:   Stretcher [5]     Order Specific Question:   Is Patient Allergic to Contrast Dye? Answer:   No     Order Specific Question:   Type of Contrast     Answer:   IV     Order Specific Question:   Does patient have history of Renal Disease?      Answer:   No     Order Specific Question:   Oral Contrast     Answer:   Per Radiologist Protocol    US ABD LTD     Clarification order also see original order     Standing Status:   Standing     Number of Occurrences:   1     Order Specific Question:   Transport     Answer:   Wheelchair [7]     Order Specific Question:   Specific Body Part     Answer:   Abdomen     CBC WITH AUTOMATED DIFF     Standing Status:   Standing     Number of Occurrences:   1    CARDIAC PANEL,(CK, CKMB & TROPONIN)     Standing Status:   Standing     Number of Occurrences:   1    CMP     Standing Status:   Standing     Number of Occurrences:   1    PROTHROMBIN TIME/INR     Standing Status:   Standing     Number of Occurrences:   1    PTT     Standing Status:   Standing     Number of Occurrences:   1    URINALYSIS W/ RFLX MICROSCOPIC     Standing Status:   Standing     Number of Occurrences:   1    MAGNESIUM     Standing Status:   Standing     Number of Occurrences:   1    LIPASE     Standing Status:   Standing     Number of Occurrences:   1    URINE MICROSCOPIC ONLY     Standing Status:   Standing     Number of Occurrences:   1    CELL COUNT AND DIFF, BODY FLUID     Standing Status:   Standing     Number of Occurrences:   1     Order Specific Question:   Fluid type: Answer:   Ascitic Fluid [83]    ALBUMIN, FLUID     Standing Status:   Standing     Number of Occurrences:   1     Order Specific Question:   Fluid type:      Answer:   Ascitic Fluid [83]    PROTEIN TOTAL, FLUID     Standing Status:   Standing     Number of Occurrences:   1     Order Specific Question:   Fluid type     Answer:   Ascitic Fluid [99]    METABOLIC PANEL, COMPREHENSIVE     Standing Status:   Standing     Number of Occurrences:   1    CBC WITH AUTOMATED DIFF     Standing Status:   Standing     Number of Occurrences:   1    METABOLIC PANEL, COMPREHENSIVE     Standing Status:   Standing     Number of Occurrences:   1    CBC W/ AUTOMATED DIFF     Standing Status:   Standing     Number of Occurrences:   1    HEMOGLOBIN A1C     Standing Status:   Standing     Number of Occurrences:   1    CBC WITH AUTOMATED DIFF     Standing Status:   Standing     Number of Occurrences:   1    METABOLIC PANEL, COMPREHENSIVE     Standing Status:   Standing     Number of Occurrences:   1    PROTHROMBIN TIME + INR     Standing Status:   Standing     Number of Occurrences:   1    DIET GI SOFT No options chosen     Standing Status:   Standing     Number of Occurrences:   1     Order Specific Question:   Additional options: Answer:   No options chosen    VITAL SIGNS PER UNIT ROUTINE     More frequently if Indicated. Standing Status:   Standing     Number of Occurrences:   1    AMBULATE WITH ASSISTANCE     Standing Status:   Standing     Number of Occurrences:   1    INTAKE AND OUTPUT     Measure and document total every 8 hours. Standing Status:   Standing     Number of Occurrences:   1    APPLY/MAINTAIN SEQUENTIAL COMPRESSION DEVICE     Standing Status:   Standing     Number of Occurrences:   1    NURSING-MISCELLANEOUS: SEE HYPOGLYCEMIA PROTOCOL BELOW HYPOGLYCEMIA PROTOCOL: Initiate Hypoglycemia protocol if blood glucose is less than 70 mg/dL FOR CONSCIOUS PATIENT: Administer 4 ounces fruit juice OR regular soda OR 4 glucose tablets. FOR UN. .. HYPOGLYCEMIA PROTOCOL:    Initiate Hypoglycemia protocol if blood glucose is less than 70 mg/dL    FOR CONSCIOUS PATIENT: Administer 4 ounces fruit juice OR regular soda OR 4 glucose tablets. FOR UNCONSCIOUS OR CHANGE-IN-MENTAL-STATUS PATIENT: If blood glucose is greater than or equal to 50 mg/dL administer     25 mL of 50% dextrose solution IV push, if blood glucose is less than 50 mg/dL, administer 50 mL IV push. If no IV, administer Glucagon     1 mg IM. Following Hypoglycemic Protocol: Once patient is fully alert and BG greater than 80 mg/dL provide a small snack,  if NPO consider IV fluids with dextrose. Repeat finger stick blood glucose in 15 minutes AFTER treatment, if less than 80 mg/dL repeat hypoglycemic protocol and notify provider. Document all interventions in the Electronic Medical  Record (EMR).      Standing Status:   Standing     Number of Occurrences:   1     Order Specific Question:   Description of Order:     Answer:   SEE HYPOGLYCEMIA PROTOCOL BELOW    NURSING-MISCELLANEOUS: Blood glucose targets -- ICU: 140 - 180 mg/dL;  NON-ICU: 100 - 140 mg/dL CONTINUOUS     Standing Status:   Standing     Number of Occurrences:   1     Order Specific Question:   Description of Order:     Answer:   Blood glucose targets -- ICU: 140 - 180 mg/dL;  NON-ICU: 100 - 140 mg/dL    FULL CODE     Standing Status:   Standing     Number of Occurrences:   1    IP CONSULT TO GASTROENTEROLOGY     Standing Status:   Standing     Number of Occurrences:   1     Order Specific Question:   Reason for Consult: Answer:   Pt known to you     Order Specific Question:   Did you call or speak to the consulting provider? Answer:   No     Order Specific Question:   Consult To     Answer:   ED    PT--EVAL, DEVISE PLAN OF CARE AND TREAT     Standing Status:   Standing     Number of Occurrences:   1    POC GLUCOSE - AC & HS     Standing Status:   Standing     Number of Occurrences:   62    GLUCOSE, POC     Standing Status:   Standing     Number of Occurrences:   1    GLUCOSE, POC     Standing Status:   Standing     Number of Occurrences:   1    GLUCOSE, POC     Standing Status:   Standing     Number of Occurrences:   1    EKG, 12 LEAD, INITIAL     Standing Status:   Standing     Number of Occurrences:   1     Order Specific Question:   Reason for Exam:     Answer:   weakness    SALINE LOCK IV ONE TIME Routine     Standing Status:   Standing     Number of Occurrences:   1    FALL PRECAUTIONS     Standing Status:   Standing     Number of Occurrences:   1    sodium chloride (NS) flush 5-10 mL    DISCONTD: metoprolol (LOPRESSOR) injection 2.5 mg    insulin regular (NOVOLIN R, HUMULIN R) injection 6 Units    iopamidol (ISOVUE 300) 61 % contrast injection 100 mL    magnesium oxide (MAG-OX) tablet 400 mg    albumin human 25% (BUMINATE) solution 50 g    lactulose (CHRONULAC) solution 10 g    lisinopril (PRINIVIL, ZESTRIL) tablet 20 mg     OP SIG:Take 20 mg by mouth daily.  gabapentin (NEURONTIN) capsule 300 mg     OP SIG:Take 300 mg by mouth daily.       DISCONTD: insulin lispro (HUMALOG) injection    glucose chewable tablet 16 g    glucagon (GLUCAGEN) injection 1 mg  dextrose (D50) infusion 12.5-25 g    morphine injection 2 mg    DISCONTD: ondansetron (ZOFRAN) 6 mg in 0.9% sodium chloride 50 mL IVPB    ondansetron (ZOFRAN) injection 6 mg    benzonatate (TESSALON) capsule 100 mg    morphine injection 2 mg    cefTRIAXone (ROCEPHIN) 1 g in 0.9% sodium chloride (MBP/ADV) 50 mL MBP     Order Specific Question:   Antibiotic Indications     Answer:   Intra-Abdominal Infection     Order Specific Question:   Antibiotic Indications     Answer: Other     Order Specific Question:   Other Abx Indication     Answer: Suspected UTI (pyuria, and possible SBP)    insulin detemir (LEVEMIR) injection 7 Units    DISCONTD: lidocaine (XYLOCAINE) 10 mg/mL (1 %) injection 9 mL    DISCONTD: sodium bicarbonate (NEUT) injection 1 mL    insulin lispro (HUMALOG) injection    IP CONSULT TO HOSPITALIST     Standing Status:   Standing     Number of Occurrences:   1     Order Specific Question:   Reason for Consult: Answer:   OTHER     Order Specific Question:   Did you call or speak to the consulting provider? Answer:   No     Order Specific Question:   Consult To     Answer:   ED    CYTOLOGY NON-GYN     Standing Status:   Standing     Number of Occurrences:   1     Order Specific Question:   Procedure performed: Answer:   paracentesis    INITIAL PHYSICIAN ORDER: INPATIENT Medical; 3. Patient receiving treatment that can only be provided in an inpatient setting (further clarification in H&P documentation)     Standing Status:   Standing     Number of Occurrences:   1     Order Specific Question:   Status: Answer:   Inpatient [101]     Order Specific Question:   Type of Bed     Answer:   Medical [8]     Order Specific Question:   Inpatient Hospitalization Certified Necessary for the Following Reasons     Answer:   3.  Patient receiving treatment that can only be provided in an inpatient setting (further clarification in H&P documentation)     Order Specific Question: Admitting Diagnosis     Answer:   End stage liver disease [facility]) [[id number]]     Order Specific Question:   Admitting Diagnosis     Answer:   Ascites [[id number]]     Order Specific Question:   Admitting Diagnosis     Answer:   Hyperglycemia [[id number]]     Order Specific Question:   Admitting Physician     Answer:   Jaiden Tom [[id number]]     Order Specific Question:   Attending Physician     Answer:   Jaiden Tom [[id number]]     Order Specific Question:   Estimated Length of Stay     Answer:   > or = to 2 Midnights     Order Specific Question:   Discharge Plan:     Answer:   Home with Office Follow-up       Reevaluation, Progress notes, Consult notes, or additional Procedure notes:   4:35 PM Consult: I discussed care with Dr. Vibha Verma (Gastroenterology). It was a standard discussion including patient history, chief complaint, available diagnostic results, and predicted treatment course. 4:57 PM Long discussion with patient regarding follow up with hepatologist. Discussed new medications and care plan. Pt agrees with the plan.     5:51 PM Call out to hospitalist for admission. 5:56 PM Consult: I discussed care with Dr. Cherelle Woo (hospitalist). It was a standard discussion including patient history, chief complaint, available diagnostic results, and predicted treatment course. Agrees to admit patient. Disposition:  Diagnosis:   1. HANKS (nonalcoholic steatohepatitis)    2. Controlled type 2 diabetes mellitus with complication, without long-term current use of insulin ([facility] [address]    3. Transaminitis    4. Anemia, unspecified type    5. Hypomagnesemia    6. Acute cystitis with hematuria    7. Cirrhosis of liver with ascites, unspecified hepatic cirrhosis type        Disposition: Admitted    Follow-up Information     None           Current Discharge Medication List      CONTINUE these medications which have NOT CHANGED    Details   lactulose (CHRONULAC) 10 gram/15 mL solution Take 15 mL by mouth nightly.   Qty: 480 mL, Refills: 3      metFORMIN (GLUCOPHAGE) 500 mg tablet Take 500 mg by mouth two (2) times daily (with meals). SITagliptin (JANUVIA) 100 mg tablet Take 100 mg by mouth daily. lisinopril (PRINIVIL, ZESTRIL) 20 mg tablet Take 20 mg by mouth daily. clotrimazole (LOTRIMIN) 1 % topical cream Apply a thin layer over affected area twice daily for up to 14 days. Qty: 15 g, Refills: 2    Associated Diagnoses: Candidal intertrigo      gabapentin (NEURONTIN) 300 mg capsule Take 300 mg by mouth daily. Associated Diagnoses: Abnormal uterine bleeding; Fibroid; Adenomyosis               Scribe Attestation  Jonathan Aguilar scribing for and in the presence of Jessica Chambers MD (05/20/17)      Physician Attestation  I personally performed the services described in this documentation, reviewed and edited the documentation which was dictated to the scribe in my presence, and it accurately records my words and actions.     Jessica Chambers MD (05/20/17)      Signed by: Carter Bermudez, May 19, 2017 at 2:18 PM

## 2017-05-20 ENCOUNTER — APPOINTMENT (OUTPATIENT)
Dept: ULTRASOUND IMAGING | Age: 57
DRG: 279 | End: 2017-05-20
Attending: INTERNAL MEDICINE
Payer: MEDICAID

## 2017-05-20 LAB
ALBUMIN SERPL BCP-MCNC: 2.5 G/DL (ref 3.4–5)
ALBUMIN/GLOB SERPL: 0.6 {RATIO} (ref 0.8–1.7)
ALP SERPL-CCNC: 113 U/L (ref 45–117)
ALT SERPL-CCNC: 22 U/L (ref 13–56)
ANION GAP BLD CALC-SCNC: 7 MMOL/L (ref 3–18)
AST SERPL W P-5'-P-CCNC: 39 U/L (ref 15–37)
ATRIAL RATE: 110 BPM
BASOPHILS # BLD AUTO: 0 K/UL (ref 0–0.06)
BASOPHILS # BLD: 0 % (ref 0–2)
BILIRUB SERPL-MCNC: 7.4 MG/DL (ref 0.2–1)
BUN SERPL-MCNC: 6 MG/DL (ref 7–18)
BUN/CREAT SERPL: 8 (ref 12–20)
CALCIUM SERPL-MCNC: 8.4 MG/DL (ref 8.5–10.1)
CALCULATED P AXIS, ECG09: 57 DEGREES
CALCULATED R AXIS, ECG10: 3 DEGREES
CALCULATED T AXIS, ECG11: 55 DEGREES
CHLORIDE SERPL-SCNC: 98 MMOL/L (ref 100–108)
CO2 SERPL-SCNC: 31 MMOL/L (ref 21–32)
CREAT SERPL-MCNC: 0.71 MG/DL (ref 0.6–1.3)
DIAGNOSIS, 93000: NORMAL
DIFFERENTIAL METHOD BLD: ABNORMAL
EOSINOPHIL # BLD: 0.2 K/UL (ref 0–0.4)
EOSINOPHIL NFR BLD: 3 % (ref 0–5)
ERYTHROCYTE [DISTWIDTH] IN BLOOD BY AUTOMATED COUNT: 15.8 % (ref 11.6–14.5)
GLOBULIN SER CALC-MCNC: 3.9 G/DL (ref 2–4)
GLUCOSE BLD STRIP.AUTO-MCNC: 253 MG/DL (ref 70–110)
GLUCOSE BLD STRIP.AUTO-MCNC: 279 MG/DL (ref 70–110)
GLUCOSE BLD STRIP.AUTO-MCNC: 306 MG/DL (ref 70–110)
GLUCOSE BLD STRIP.AUTO-MCNC: 334 MG/DL (ref 70–110)
GLUCOSE SERPL-MCNC: 305 MG/DL (ref 74–99)
HCT VFR BLD AUTO: 32.2 % (ref 35–45)
HGB BLD-MCNC: 11.1 G/DL (ref 12–16)
LYMPHOCYTES # BLD AUTO: 17 % (ref 21–52)
LYMPHOCYTES # BLD: 0.9 K/UL (ref 0.9–3.6)
MCH RBC QN AUTO: 36.5 PG (ref 24–34)
MCHC RBC AUTO-ENTMCNC: 34.5 G/DL (ref 31–37)
MCV RBC AUTO: 105.9 FL (ref 74–97)
MONOCYTES # BLD: 0.7 K/UL (ref 0.05–1.2)
MONOCYTES NFR BLD AUTO: 14 % (ref 3–10)
NEUTS SEG # BLD: 3.4 K/UL (ref 1.8–8)
NEUTS SEG NFR BLD AUTO: 66 % (ref 40–73)
P-R INTERVAL, ECG05: 150 MS
PLATELET # BLD AUTO: 61 K/UL (ref 135–420)
PMV BLD AUTO: 9.6 FL (ref 9.2–11.8)
POTASSIUM SERPL-SCNC: 3.5 MMOL/L (ref 3.5–5.5)
PROT SERPL-MCNC: 6.4 G/DL (ref 6.4–8.2)
Q-T INTERVAL, ECG07: 378 MS
QRS DURATION, ECG06: 98 MS
QTC CALCULATION (BEZET), ECG08: 511 MS
RBC # BLD AUTO: 3.04 M/UL (ref 4.2–5.3)
SODIUM SERPL-SCNC: 136 MMOL/L (ref 136–145)
VENTRICULAR RATE, ECG03: 110 BPM
WBC # BLD AUTO: 5.2 K/UL (ref 4.6–13.2)

## 2017-05-20 PROCEDURE — 74011250637 HC RX REV CODE- 250/637: Performed by: HOSPITALIST

## 2017-05-20 PROCEDURE — 87086 URINE CULTURE/COLONY COUNT: CPT | Performed by: INTERNAL MEDICINE

## 2017-05-20 PROCEDURE — 87186 SC STD MICRODIL/AGAR DIL: CPT | Performed by: INTERNAL MEDICINE

## 2017-05-20 PROCEDURE — 74011636637 HC RX REV CODE- 636/637: Performed by: HOSPITALIST

## 2017-05-20 PROCEDURE — 97161 PT EVAL LOW COMPLEX 20 MIN: CPT

## 2017-05-20 PROCEDURE — 36415 COLL VENOUS BLD VENIPUNCTURE: CPT | Performed by: INTERNAL MEDICINE

## 2017-05-20 PROCEDURE — 76705 ECHO EXAM OF ABDOMEN: CPT

## 2017-05-20 PROCEDURE — 65270000029 HC RM PRIVATE

## 2017-05-20 PROCEDURE — 87077 CULTURE AEROBIC IDENTIFY: CPT | Performed by: INTERNAL MEDICINE

## 2017-05-20 PROCEDURE — 74011000258 HC RX REV CODE- 258: Performed by: INTERNAL MEDICINE

## 2017-05-20 PROCEDURE — 74011250636 HC RX REV CODE- 250/636: Performed by: HOSPITALIST

## 2017-05-20 PROCEDURE — 82962 GLUCOSE BLOOD TEST: CPT

## 2017-05-20 PROCEDURE — 74011250636 HC RX REV CODE- 250/636: Performed by: INTERNAL MEDICINE

## 2017-05-20 PROCEDURE — 80053 COMPREHEN METABOLIC PANEL: CPT | Performed by: INTERNAL MEDICINE

## 2017-05-20 PROCEDURE — 85025 COMPLETE CBC W/AUTO DIFF WBC: CPT | Performed by: INTERNAL MEDICINE

## 2017-05-20 RX ORDER — LIDOCAINE HYDROCHLORIDE 10 MG/ML
9 INJECTION INFILTRATION; PERINEURAL
Status: DISCONTINUED | OUTPATIENT
Start: 2017-05-20 | End: 2017-05-20 | Stop reason: ALTCHOICE

## 2017-05-20 RX ORDER — INSULIN LISPRO 100 [IU]/ML
INJECTION, SOLUTION INTRAVENOUS; SUBCUTANEOUS
Status: DISCONTINUED | OUTPATIENT
Start: 2017-05-20 | End: 2017-05-23 | Stop reason: HOSPADM

## 2017-05-20 RX ORDER — MORPHINE SULFATE 4 MG/ML
2 INJECTION, SOLUTION INTRAMUSCULAR; INTRAVENOUS
Status: DISCONTINUED | OUTPATIENT
Start: 2017-05-20 | End: 2017-05-23 | Stop reason: HOSPADM

## 2017-05-20 RX ORDER — BENZONATATE 100 MG/1
100 CAPSULE ORAL
Status: DISCONTINUED | OUTPATIENT
Start: 2017-05-20 | End: 2017-05-23 | Stop reason: HOSPADM

## 2017-05-20 RX ADMIN — INSULIN DETEMIR 7 UNITS: 100 INJECTION, SOLUTION SUBCUTANEOUS at 16:17

## 2017-05-20 RX ADMIN — LISINOPRIL 20 MG: 20 TABLET ORAL at 10:19

## 2017-05-20 RX ADMIN — INSULIN LISPRO 12 UNITS: 100 INJECTION, SOLUTION INTRAVENOUS; SUBCUTANEOUS at 21:51

## 2017-05-20 RX ADMIN — Medication 2 MG: at 21:53

## 2017-05-20 RX ADMIN — INSULIN LISPRO 6 UNITS: 100 INJECTION, SOLUTION INTRAVENOUS; SUBCUTANEOUS at 13:37

## 2017-05-20 RX ADMIN — INSULIN LISPRO 8 UNITS: 100 INJECTION, SOLUTION INTRAVENOUS; SUBCUTANEOUS at 10:19

## 2017-05-20 RX ADMIN — Medication 2 MG: at 10:18

## 2017-05-20 RX ADMIN — INSULIN LISPRO 6 UNITS: 100 INJECTION, SOLUTION INTRAVENOUS; SUBCUTANEOUS at 18:28

## 2017-05-20 RX ADMIN — BENZONATATE 100 MG: 100 CAPSULE ORAL at 16:57

## 2017-05-20 RX ADMIN — CEFTRIAXONE 1 G: 1 INJECTION, POWDER, FOR SOLUTION INTRAMUSCULAR; INTRAVENOUS at 16:17

## 2017-05-20 RX ADMIN — GABAPENTIN 300 MG: 300 CAPSULE ORAL at 10:19

## 2017-05-20 NOTE — PROGRESS NOTES
Received bedside verbal report from [facility] RN  Pt resting, no sign of distress. Call light within reach. 1510 to US for procedure    1920 Bedside and Verbal shift change report given to Angelita RN (oncoming nurse) by Cyrus Fraser RN (offgoing nurse). Report included the following information SBAR and MAR.

## 2017-05-20 NOTE — PROGRESS NOTES
Pt received at 1115pm.Aox4. Daughter at side. Pt requested pain medication and cough medication. Hospitalist notified. Pt refused chronulac. States she cannot take liquid form of medications. Hospitalist notified. ASked about sepsis. Advised to continue to monitor pt. Pt has been resting comfortably. no distress noted.

## 2017-05-20 NOTE — PROGRESS NOTES
Progress Note      Patient: Miguel A Brady               Sex: female          DOA: 5/19/2017       YOB: 1960      Age:  49 y.o.        LOS:  LOS: 1 day               Subjective:   Help of gastroenterology greatly appreciated . The pt has HANKS  Cirrhosis. She is presently having  abdominal pain    scan of the abdomen showed cholelithiasis  and a cirrhotic liver   The pt has ascites and marked splenomegaly which was tender to palpation . there was a spontaneious  splenorenal shunt the pt also has extensive varices . will discuss etiology of the abdominal pain with gi       Objective:      Visit Vitals    /77 (BP 1 Location: Right arm, BP Patient Position: Supine)    Pulse (!) 108    Temp 98.4 °F (36.9 °C)    Resp 18    Ht 5' 4\" (1.626 m)    Wt 134.3 kg (296 lb)    SpO2 95%    BMI 50.81 kg/m2       Physical Exam:  Pt is awake and is c/o pain in the abdomen   Heart reg rate and rhythm   Lungs fair breath sounds heard   Abdomen obese  large spleen felt and was tender to palpation   Has had bleeding around the umbilicus   Neuro seems to be intact .          Lab/Data Reviewed:  CMP:   Lab Results   Component Value Date/Time     05/20/2017 08:05 AM    K 3.5 05/20/2017 08:05 AM    CL 98 (L) 05/20/2017 08:05 AM    CO2 31 05/20/2017 08:05 AM    AGAP 7 05/20/2017 08:05 AM     (H) 05/20/2017 08:05 AM    BUN 6 (L) 05/20/2017 08:05 AM    CREA 0.71 05/20/2017 08:05 AM    GFRAA >60 05/20/2017 08:05 AM    GFRNA >60 05/20/2017 08:05 AM    CA 8.4 (L) 05/20/2017 08:05 AM    MG 1.5 (L) 05/19/2017 02:36 PM    ALB 2.5 (L) 05/20/2017 08:05 AM    TP 6.4 05/20/2017 08:05 AM    GLOB 3.9 05/20/2017 08:05 AM    AGRAT 0.6 (L) 05/20/2017 08:05 AM    SGOT 39 (H) 05/20/2017 08:05 AM    ALT 22 05/20/2017 08:05 AM     CBC:   Lab Results   Component Value Date/Time    WBC 5.2 05/20/2017 08:05 AM    HGB 11.1 (L) 05/20/2017 08:05 AM    HCT 32.2 (L) 05/20/2017 08:05 AM    PLT 61 (L) 05/20/2017 08:05 AM Assessment/Plan     Active Problems:    Hyperglycemia (5/19/2017)      Ascites (5/19/2017)      End stage liver disease ([address] (5/19/2017)  Splenomegaly with a spontaneous splenorenal shunt . HANKS with cirrhosis and ascites . Abdominal pain   Plan: will discuss the etiology of the pain with gi .

## 2017-05-20 NOTE — PROGRESS NOTES
PROGRESS NOTE   PATIENT:  Miguel A Brady           MRN: [id number]           [place], 2113/01 5/20/2017, 1:35 PM      I    SUBJECTIVE:  Diffuse abdominal pain and swelling. Nausea, anorexia, no vomiting. OBJECTIVE:  Patient Vitals for the past 24 hrs:   Temp Pulse Resp BP SpO2   05/20/17 0603 98.4 °F (36.9 °C) (!) 108 18 128/77 95 %   05/19/17 2321 98.6 °F (37 °C) (!) 109 18 151/58 95 %   05/19/17 2230 - - - 152/60 -   05/19/17 2200 - (!) 112 15 133/54 100 %   05/19/17 2145 - (!) 112 22 - 100 %   05/19/17 2130 - (!) 111 14 124/41 100 %   05/19/17 2115 - (!) 112 15 - 100 %   05/19/17 2100 - (!) 115 25 - 100 %   05/19/17 2045 - (!) 116 21 - 100 %   05/19/17 2030 - - - 124/51 -   05/19/17 2015 - (!) 112 20 - 100 %   05/19/17 2000 - (!) 110 15 103/78 100 %   05/19/17 1830 - (!) 111 18 103/44 100 %   05/19/17 1810 - (!) 111 19 (!) 88/36 100 %   05/19/17 1730 - (!) 110 16 111/56 100 %   05/19/17 1630 - (!) 111 24 126/54 97 %   05/19/17 1615 - - - 115/44 -   05/19/17 1548 - - - - 100 %   05/19/17 1520 - - - - 100 %   05/19/17 1518 - - - 109/65 -   05/19/17 1416 - - - - 100 %   05/19/17 1415 - - - 106/74 -         Intake/Output Summary (Last 24 hours) at 05/20/17 1335  Last data filed at 05/20/17 0926   Gross per 24 hour   Intake                0 ml   Output                0 ml   Net                0 ml       Gen: NAD  Heent: No pallor, icterus  Lungs: Clear B/L   CVS exam: Regular rate and rhythm   Abd  : Distended with ascites, mildly tender especially in the RUQ, BS +, No masses felt. Extremities  : Bilateral pedal edema B/L   Neuro: Alert, oriented X 3, No asterexis.     Labs: Results:   Chemistry Recent Labs      05/20/17   0805  05/19/17   1436   GLU  305*  411*   NA  136  132*   K  3.5  3.7   CL  98*  94*   CO2  31  27   BUN  6*  4*   CREA  0.71  1.00   CA  8.4*  8.3*   AGAP  7  11   BUCR  8*  4*   AP  113  142*   TP  6.4  7.0   ALB  2.5*  2.2*   GLOB  3.9  4.8*   AGRAT  0.6*  0.5* Estimated Creatinine Clearance: 123.7 mL/min (based on Cr of 0.71). CBC w/Diff Recent Labs      05/20/17   0805  05/19/17   1436   WBC  5.2  5.4   RBC  3.04*  3.10*   HGB  11.1*  11.3*   HCT  32.2*  32.5*   PLT  61*  52*   GRANS  66  66   LYMPH  17*  19*   EOS  3  2      Cardiac Enzymes Recent Labs      05/19/17   1436   CPK  100   CKND1  1.5      Coagulation Recent Labs      05/19/17   1436   PTP  18.1*   INR  1.6*   APTT  32.2       Hepatitis Panel Lab Results   Component Value Date/Time    HEPATITIS BE AB Negative 06/24/2011 05:35 AM    Hep B surface Ag screen Negative 07/28/2014 12:00 AM    HEP C VIRUS AB <0.1 07/28/2014 12:00 AM    Hepatitis C virus Ab .02 06/24/2011 05:35 AM      Amylase Lipase    Liver Enzymes Recent Labs      05/20/17   0805  05/19/17   1436   TP  6.4  7.0   ALB  2.5*  2.2*   TBILI  7.4*  6.6*   AP  113  142*   SGOT  39*  46*   ALT  22  27      Thyroid Studies No results for input(s): T4, T3U, TSH, TSHEXT in the last 72 hours.     No lab exists for component: T3RU     Pathology pathology         No Known Allergies    Current Facility-Administered Medications   Medication Dose Route Frequency    benzonatate (TESSALON) capsule 100 mg  100 mg Oral TID PRN    morphine injection 2 mg  2 mg IntraVENous Q4H PRN    sodium chloride (NS) flush 5-10 mL  5-10 mL IntraVENous PRN    lactulose (CHRONULAC) solution 10 g  10 g Oral QHS    lisinopril (PRINIVIL, ZESTRIL) tablet 20 mg  20 mg Oral DAILY    gabapentin (NEURONTIN) capsule 300 mg  300 mg Oral DAILY    insulin lispro (HUMALOG) injection   SubCUTAneous AC&HS    glucose chewable tablet 16 g  4 Tab Oral PRN    glucagon (GLUCAGEN) injection 1 mg  1 mg IntraMUSCular PRN    dextrose (D50) infusion 12.5-25 g  25-50 mL IntraVENous PRN    ondansetron (ZOFRAN) injection 6 mg  6 mg IntraVENous Q6H PRN       ASSESSMENT:  Active Problems:    Hyperglycemia (5/19/2017)      Ascites (5/19/2017)      End stage liver disease (HCC) (5/19/2017)      HANKS cirrhosis, with ascites, rule out SBP, mild encephalopathy. Probable UTI. Urine culture. Paracentesis, with diagnostic studies. Rocephin 1 gm daily. Blood sugar control per hospitalist.  Monitor liver and renal function.     Luzma Tan MD

## 2017-05-20 NOTE — PROGRESS NOTES
Problem: Mobility Impaired (Adult and Pediatric)  Goal: *Acute Goals and Plan of Care (Insert Text)  No goals at this time. Patient is at baseline function. Discussed option of home health PT if patient would like and she was agreeable. Thank you for the referral  Demetrius Mike, PT, DPT  PHYSICAL THERAPY EVALUATION AND DISCHARGE     Patient: Chuck Linton (43 y.o. female)  Date: 5/20/2017  Primary Diagnosis: End stage liver disease ([facility]  Ascites  Hyperglycemia        Precautions:          ASSESSMENT AND RECOMMENDATIONS:  Based on the objective data described below, the patient presents with good mobility and function with safe bed mobility, transfers and ambulation. Patient educated on the importance of ambulation and maintaining current function. As a result skilled physical therapy is not indicated at this time. Patient seems to be self-limiting  Discharge Recommendations: None vs home health PT  Further Equipment Recommendations for Discharge: N/A        SUBJECTIVE:   Patient stated I have arthritis and it limits me.       OBJECTIVE DATA SUMMARY:       Past Medical History:   Diagnosis Date    Bilateral knee pain      Cirrhosis of liver ([facility]      Depression      Diabetes ([facility]      Diastolic hypertension      Fatty liver      Gastritis      Hypertension      Ill-defined condition      Inflammation of lymphatics      Liver disease       cirrhosis    Menorrhagia      HANKS (nonalcoholic steatohepatitis)      Nausea      Osteoarthritis      Right flank pain      RUQ abdominal pain      Sleep apnea       Does not use consistently    Thrombocytopenia (HCC)       Past Surgical History:   Procedure Laterality Date    ENDOSCOPY, COLON, DIAGNOSTIC   5/20/2013    HX HYSTERECTOMY        HX OTHER SURGICAL         liver biopsy    HX OVARIAN CYST REMOVAL         Bilateral     Barriers to Learning/Limitations: None  Compensate with: visual, verbal, tactile, kinesthetic cues/model  GCODES(GP):Mobility  Current  CH= 0%   Goal  CH= 0%  D/C  CH= 0%. The severity rating is based on the Other functional assessment  Prior Level of Function/Home Situation: independent   Home Situation  Home Environment: Apartment  One/Two Story Residence: One story  Living Alone: No  Support Systems: Child(jailyn)  Patient Expects to be Discharged to[de-identified] Apartment  Current DME Used/Available at Home: Wheelchair  Strength:    Strength: Within functional limits     Range Of Motion:  AROM: Within functional limits  PROM: Within functional limits  Functional Mobility:  Bed Mobility:  Rolling: Modified independent  Supine to Sit: Modified independent  Sit to Supine: Modified independent  Transfers:  Sit to Stand: Modified independent (to RW)  Balance:   Sitting: Intact  Standing: Intact     Pain:  Pain Scale 1: Numeric (0 - 10)  Pain Intensity 1: 2  Pain Location 1: Knee  Pain Orientation 1: Left;Right  Pain Description 1: Aching  Pain Intervention(s) 1: Emotional support;Position  Activity Tolerance:   good  Please refer to the flowsheet for vital signs taken during this treatment. After treatment:   [X]         Patient left in no apparent distress sitting up in chair  [ ]         Patient left in no apparent distress in bed  [ ]         Call bell left within reach  [ ]         Nursing notified  [ ]         Caregiver present  [ ]         Bed alarm activated      COMMUNICATION/EDUCATION:   [X]         Fall prevention education was provided and the patient/caregiver indicated understanding. [ ]         Patient/family have participated as able in goal setting and plan of care. [ ]         Patient/family agree to work toward stated goals and plan of care. [ ]         Patient understands intent and goals of therapy, but is neutral about his/her participation. [ ]         Patient is unable to participate in goal setting and plan of care.      Thank you for this referral.  Reed Castaneda, PT   Time Calculation: 13 mins

## 2017-05-20 NOTE — H&P
[address]    Name:  Janie Person  MR#:  [id number]  :  1960  Account #:  [de-identified]  Date of Adm:  2017      CHIEF COMPLAINT: Abdominal pain, fatigue. HISTORY OF PRESENT ILLNESS: The patient is a 65-year-old  female. She has a longstanding history of nonalcoholic hepatitis in  which she has a GI specialist. She also has a history of diabetes,  hypertension, and multiple other comorbidities. The patient states that  she woke up this morning, noticed a foul smell, and immediately  noticed some blood on the front of her nightgown. This blood was  coming from her navel. She said she has had bleeding from the navel  in the past; however, it was minimal. This is the worst it has ever been. She has also been having a lot of weakness that has been  progressively worsening over the past month, muscle spasms,  bruising, lightheadedness. She does not ambulate secondary to knee  pain. The patient decided to come into the ER for medical evaluation. While in the ED, GI was consulted. She was seen. Findings included a  low magnesium, elevated blood glucose levels. The patient was  edematous. After being seen by GI, she was placed on albumin with  the  recommendation for admission. The patient will be admitted for end-  stage liver disease.       Past Medical History:   Diagnosis Date    Bilateral knee pain     Cirrhosis of liver (HCC)     Depression     Diabetes ([address]     Diastolic hypertension     Fatty liver     Gastritis     Hypertension     Ill-defined condition     Inflammation of lymphatics     Liver disease     cirrhosis    Menorrhagia     HANKS (nonalcoholic steatohepatitis)     Nausea     Osteoarthritis     Right flank pain     RUQ abdominal pain     Sleep apnea     Does not use consistently    Thrombocytopenia (HCC)        Past Surgical History:   Procedure Laterality Date    ENDOSCOPY, COLON, DIAGNOSTIC  2013    HX HYSTERECTOMY      HX OTHER SURGICAL      liver biopsy    HX OVARIAN CYST REMOVAL      Bilateral       Social History     Social History    Marital status:      Spouse name: N/A    Number of children: N/A    Years of education: N/A     Occupational History    Not on file. Social History Main Topics    Smoking status: Never Smoker    Smokeless tobacco: Never Used    Alcohol use No    Drug use: No    Sexual activity: Yes     Partners: Male     Birth control/ protection: None     Other Topics Concern    Blood Transfusions Yes     prior to hysterectomy    Occupational Exposure No    Hobby Hazards No    Stress Concern No    Weight Concern Yes    Exercise No    Seat Belt Yes    Self-Exams No     Social History Narrative       History reviewed. No pertinent family history. No Known Allergies    Review of Systems:  Positive in bold. CONST: Fever, weight loss, fatigue or chills  GI: Nausea, vomiting, abdominal pain, change in bowel habits, hematochezia, melena, and GERD   INTEG: Dermatitis, abnormal moles  HEENT: Recent changes in vision, vertigo, epistaxis, dysphagia, hoarseness  CV: Chest pain, palpitations, HTN, edema and varicosities  RESP: Cough, shortness of breath, wheezing, hemoptysis, snoring.    : Hematuria, dysuria, frequency, urgency, retention, incontinence   MS: Weakness, joint pain and arthritis, muscle spasms,  ENDO: Diabetes, thyroid disease, polyuria, polydipsia, polyphagia, poor wound healing, heat intolerance, cold intolerance  LYMPH/HEME: Anemia, bruising and history of blood transfusions  NEURO: Dizziness, headache, fainting, seizures and stroke  PSYCH: Anxiety , depression    Physical Exam:      Visit Vitals    /44    Pulse (!) 111    Temp 98.9 °F (37.2 °C)    Resp 18    Ht 5' 4\" (1.626 m)    Wt 134.3 kg (296 lb)    SpO2 100%    BMI 50.81 kg/m2       Physical Exam:  Gen:  No distress, alert, awake and oriented x 4  HEENT:  Normal cephalic atraumatic, extra-occular movements are intact. Neck:  Supple, No JVD  Lungs:  Clear bilaterally, no wheeze, no rales, normal effort  Cardiovascular:  Tachycardia with normal rhythm, normal S1 and S2, no audible murmur. Capillary refill: < 3 seconds. Abdomen:  Soft, + tender, non distended, normal bowel sounds, no guarding. Extremities:  Well perfused, no cyanosis, 1+ edema  Neurological:  Awake and alert, CN's are intact. Skin:  No rashes or moles. Turgor and color normal.  Some bruising on abdomen. Mental Status:  Normal thought process, mildly anxious      Laboratory Studies: All lab results for the last 24 hours reviewed. Assessment/Plan     Active Problems:    Hyperglycemia (5/19/2017)      Ascites (5/19/2017)      End stage liver disease ([facility] (5/19/2017)        PLAN:    GI: Nausea, diarrhea, abdominal pain, ascites   GI consult- see recommendations   Zofran as needed. Respiratory: shortness of breath, COPD exacerbation, CAP, HCAP   Oxygen via nasal canula   Respiratory nebulizer-   Continue IV antibiotics    CV: HTN- blood pressure controlled    Admit to medical units   Continue antihypertensive mediation as per home   Monitor vitals as per unit     Heme:  DVT prophylaxis   Bilateral lower extremity compression devices    Metabolic/Endo: DM   FS AC&HS with sliding scale coverage   Hold all home oral antihyperglycemics   Diabetic diet    Misc:  FULL CODE   Physical therapy to evaluate and treat   Fall precautions   Ambulate with assistance. Monitor intake and output   Monitor vitals as per unit   Replace electrolytes as needed. Follow up am labs.          MD IVAN Willis / Taylor Villa  D:  05/19/2017   21:36  T:  05/19/2017   21:57  Job #:  [id number]

## 2017-05-20 NOTE — PROGRESS NOTES
Problem: Discharge Planning  Goal: *Discharge to safe environment  Outcome: Progressing Towards Goal  Home with poss hh

## 2017-05-20 NOTE — PROGRESS NOTES
Patient has designated _______________daughter_________ to participate in his/her discharge plan and to receive any needed information.      Name: Geraldo Larios  Address:  Phone number:[phone]

## 2017-05-21 LAB
ALBUMIN SERPL BCP-MCNC: 2.1 G/DL (ref 3.4–5)
ALBUMIN/GLOB SERPL: 0.6 {RATIO} (ref 0.8–1.7)
ALP SERPL-CCNC: 100 U/L (ref 45–117)
ALT SERPL-CCNC: 18 U/L (ref 13–56)
ANION GAP BLD CALC-SCNC: 8 MMOL/L (ref 3–18)
AST SERPL W P-5'-P-CCNC: 37 U/L (ref 15–37)
BASOPHILS # BLD AUTO: 0 K/UL (ref 0–0.06)
BASOPHILS # BLD: 0 % (ref 0–2)
BILIRUB SERPL-MCNC: 5.9 MG/DL (ref 0.2–1)
BUN SERPL-MCNC: 8 MG/DL (ref 7–18)
BUN/CREAT SERPL: 12 (ref 12–20)
CALCIUM SERPL-MCNC: 7.9 MG/DL (ref 8.5–10.1)
CHLORIDE SERPL-SCNC: 98 MMOL/L (ref 100–108)
CO2 SERPL-SCNC: 31 MMOL/L (ref 21–32)
CREAT SERPL-MCNC: 0.65 MG/DL (ref 0.6–1.3)
DIFFERENTIAL METHOD BLD: ABNORMAL
EOSINOPHIL # BLD: 0.1 K/UL (ref 0–0.4)
EOSINOPHIL NFR BLD: 2 % (ref 0–5)
ERYTHROCYTE [DISTWIDTH] IN BLOOD BY AUTOMATED COUNT: 15.9 % (ref 11.6–14.5)
GLOBULIN SER CALC-MCNC: 3.6 G/DL (ref 2–4)
GLUCOSE BLD STRIP.AUTO-MCNC: 221 MG/DL (ref 70–110)
GLUCOSE BLD STRIP.AUTO-MCNC: 278 MG/DL (ref 70–110)
GLUCOSE BLD STRIP.AUTO-MCNC: 289 MG/DL (ref 70–110)
GLUCOSE BLD STRIP.AUTO-MCNC: 311 MG/DL (ref 70–110)
GLUCOSE SERPL-MCNC: 231 MG/DL (ref 74–99)
HCT VFR BLD AUTO: 30.1 % (ref 35–45)
HGB BLD-MCNC: 10.3 G/DL (ref 12–16)
INR PPP: 1.7 (ref 0.8–1.2)
LYMPHOCYTES # BLD AUTO: 14 % (ref 21–52)
LYMPHOCYTES # BLD: 0.8 K/UL (ref 0.9–3.6)
MCH RBC QN AUTO: 36.4 PG (ref 24–34)
MCHC RBC AUTO-ENTMCNC: 34.2 G/DL (ref 31–37)
MCV RBC AUTO: 106.4 FL (ref 74–97)
MONOCYTES # BLD: 0.9 K/UL (ref 0.05–1.2)
MONOCYTES NFR BLD AUTO: 16 % (ref 3–10)
NEUTS SEG # BLD: 4 K/UL (ref 1.8–8)
NEUTS SEG NFR BLD AUTO: 68 % (ref 40–73)
PLATELET # BLD AUTO: 56 K/UL (ref 135–420)
PMV BLD AUTO: 9.3 FL (ref 9.2–11.8)
POTASSIUM SERPL-SCNC: 3.3 MMOL/L (ref 3.5–5.5)
PROT SERPL-MCNC: 5.7 G/DL (ref 6.4–8.2)
PROTHROMBIN TIME: 19.5 SEC (ref 11.5–15.2)
RBC # BLD AUTO: 2.83 M/UL (ref 4.2–5.3)
SODIUM SERPL-SCNC: 137 MMOL/L (ref 136–145)
WBC # BLD AUTO: 5.9 K/UL (ref 4.6–13.2)

## 2017-05-21 PROCEDURE — 74011250636 HC RX REV CODE- 250/636: Performed by: HOSPITALIST

## 2017-05-21 PROCEDURE — 74011250637 HC RX REV CODE- 250/637: Performed by: INTERNAL MEDICINE

## 2017-05-21 PROCEDURE — 85610 PROTHROMBIN TIME: CPT | Performed by: INTERNAL MEDICINE

## 2017-05-21 PROCEDURE — 36415 COLL VENOUS BLD VENIPUNCTURE: CPT | Performed by: INTERNAL MEDICINE

## 2017-05-21 PROCEDURE — 74011250636 HC RX REV CODE- 250/636: Performed by: INTERNAL MEDICINE

## 2017-05-21 PROCEDURE — 74011250637 HC RX REV CODE- 250/637: Performed by: HOSPITALIST

## 2017-05-21 PROCEDURE — 74011000258 HC RX REV CODE- 258: Performed by: INTERNAL MEDICINE

## 2017-05-21 PROCEDURE — 82962 GLUCOSE BLOOD TEST: CPT

## 2017-05-21 PROCEDURE — 85025 COMPLETE CBC W/AUTO DIFF WBC: CPT | Performed by: INTERNAL MEDICINE

## 2017-05-21 PROCEDURE — 65270000029 HC RM PRIVATE

## 2017-05-21 PROCEDURE — 80053 COMPREHEN METABOLIC PANEL: CPT | Performed by: INTERNAL MEDICINE

## 2017-05-21 PROCEDURE — 74011636637 HC RX REV CODE- 636/637: Performed by: HOSPITALIST

## 2017-05-21 RX ORDER — SPIRONOLACTONE 25 MG/1
50 TABLET ORAL DAILY
Status: DISCONTINUED | OUTPATIENT
Start: 2017-05-21 | End: 2017-05-23 | Stop reason: HOSPADM

## 2017-05-21 RX ORDER — FUROSEMIDE 10 MG/ML
20 INJECTION INTRAMUSCULAR; INTRAVENOUS DAILY
Status: DISCONTINUED | OUTPATIENT
Start: 2017-05-21 | End: 2017-05-23 | Stop reason: HOSPADM

## 2017-05-21 RX ORDER — ACETAMINOPHEN 325 MG/1
650 TABLET ORAL
Status: DISCONTINUED | OUTPATIENT
Start: 2017-05-21 | End: 2017-05-23 | Stop reason: HOSPADM

## 2017-05-21 RX ADMIN — INSULIN DETEMIR 10 UNITS: 100 INJECTION, SOLUTION SUBCUTANEOUS at 17:59

## 2017-05-21 RX ADMIN — FUROSEMIDE 20 MG: 10 INJECTION, SOLUTION INTRAMUSCULAR; INTRAVENOUS at 16:19

## 2017-05-21 RX ADMIN — SPIRONOLACTONE 50 MG: 25 TABLET, FILM COATED ORAL at 16:09

## 2017-05-21 RX ADMIN — CEFTRIAXONE 1 G: 1 INJECTION, POWDER, FOR SOLUTION INTRAMUSCULAR; INTRAVENOUS at 16:08

## 2017-05-21 RX ADMIN — ACETAMINOPHEN 650 MG: 325 TABLET, FILM COATED ORAL at 05:51

## 2017-05-21 RX ADMIN — BENZONATATE 100 MG: 100 CAPSULE ORAL at 02:40

## 2017-05-21 RX ADMIN — INSULIN LISPRO 9 UNITS: 100 INJECTION, SOLUTION INTRAVENOUS; SUBCUTANEOUS at 17:59

## 2017-05-21 RX ADMIN — INSULIN DETEMIR 7 UNITS: 100 INJECTION, SOLUTION SUBCUTANEOUS at 09:05

## 2017-05-21 RX ADMIN — INSULIN LISPRO 12 UNITS: 100 INJECTION, SOLUTION INTRAVENOUS; SUBCUTANEOUS at 12:43

## 2017-05-21 RX ADMIN — Medication 2 MG: at 22:56

## 2017-05-21 RX ADMIN — BENZONATATE 100 MG: 100 CAPSULE ORAL at 22:58

## 2017-05-21 RX ADMIN — GABAPENTIN 300 MG: 300 CAPSULE ORAL at 09:06

## 2017-05-21 RX ADMIN — INSULIN LISPRO 6 UNITS: 100 INJECTION, SOLUTION INTRAVENOUS; SUBCUTANEOUS at 09:04

## 2017-05-21 RX ADMIN — LACTULOSE 10 G: 20 SOLUTION ORAL at 16:09

## 2017-05-21 RX ADMIN — Medication 2 MG: at 10:26

## 2017-05-21 RX ADMIN — LISINOPRIL 20 MG: 20 TABLET ORAL at 09:06

## 2017-05-21 RX ADMIN — ACETAMINOPHEN 650 MG: 325 TABLET, FILM COATED ORAL at 12:44

## 2017-05-21 RX ADMIN — INSULIN LISPRO 9 UNITS: 100 INJECTION, SOLUTION INTRAVENOUS; SUBCUTANEOUS at 22:58

## 2017-05-21 NOTE — ROUTINE PROCESS
Bedside / verbal shift change report given to Angelita RN  (oncoming nurse) by Jimmie Sibley RN (offgoing nurse). Report included the following information SBAR, Kardex, Intake/Output, MAR and Recent Results.

## 2017-05-21 NOTE — PROGRESS NOTES
Assumed care of patient, patient resting comfortably, bed in lowest position. Call bell in reach, no visual signs of distress.  Sbar report received from Kurtis

## 2017-05-21 NOTE — PROGRESS NOTES
PROGRESS NOTE   PATIENT:  Chuck Linton           MRN: [id number]           [facility], 2113/01 5/21/2017, 12:08 PM      I      SUBJECTIVE:  \"I feel better\". Continues to have abdominal pain. Less nauseated and anorexic. Paracentesis could not be performed apparently due to fluid being inaccessible. No BMs today or yesterday. OBJECTIVE:  Patient Vitals for the past 24 hrs:   Temp Pulse Resp BP SpO2   05/21/17 0906 - (!) 106 - 121/73 -   05/21/17 0551 98.8 °F (37.1 °C) (!) 110 17 110/64 96 %   05/20/17 1859 98.5 °F (36.9 °C) (!) 109 18 120/63 99 %   05/20/17 1530 98.1 °F (36.7 °C) (!) 115 18 135/81 92 %         Intake/Output Summary (Last 24 hours) at 05/21/17 1208  Last data filed at 05/21/17 0645   Gross per 24 hour   Intake              240 ml   Output              650 ml   Net             -410 ml       Gen: NAD  Heent: No pallor, icterus  Lungs: Clear B/L   CVS exam: Regular rate and rhythm   Abd  : Obese, mildly distended, mildly tender in the RUQ, BS +, No masses felt. Neuro: Alert, oriented X 2, No asterexis. Labs: Results:   Chemistry Recent Labs      05/21/17 0445 05/20/17   0805 05/19/17   1436   GLU  231*  305*  411*   NA  137  136  132*   K  3.3*  3.5  3.7   CL  98*  98*  94*   CO2  31  31  27   BUN  8  6*  4*   CREA  0.65  0.71  1.00   CA  7.9*  8.4*  8.3*   AGAP  8  7  11   BUCR  12  8*  4*   AP  100  113  142*   TP  5.7*  6.4  7.0   ALB  2.1*  2.5*  2.2*   GLOB  3.6  3.9  4.8*   AGRAT  0.6*  0.6*  0.5*    Estimated Creatinine Clearance: 135.1 mL/min (based on Cr of 0.65).    CBC w/Diff Recent Labs      05/21/17 0445 05/20/17   0805  05/19/17   1436   WBC  5.9  5.2  5.4   RBC  2.83*  3.04*  3.10*   HGB  10.3*  11.1*  11.3*   HCT  30.1*  32.2*  32.5*   PLT  56*  61*  52*   GRANS  68  66  66   LYMPH  14*  17*  19*   EOS  2  3  2      Cardiac Enzymes Recent Labs      05/19/17   1436   CPK  100   CKND1  1.5      Coagulation Recent Labs      05/21/17   0445  05/19/17 1436   PTP  19.5*  18.1*   INR  1.7*  1.6*   APTT   --   32.2       Hepatitis Panel Lab Results   Component Value Date/Time    HEPATITIS BE AB Negative 06/24/2011 05:35 AM    Hep B surface Ag screen Negative 07/28/2014 12:00 AM    HEP C VIRUS AB <0.1 07/28/2014 12:00 AM    Hepatitis C virus Ab .02 06/24/2011 05:35 AM      Amylase Lipase    Liver Enzymes Recent Labs      05/21/17   0445  05/20/17   0805  05/19/17   1436   TP  5.7*  6.4  7.0   ALB  2.1*  2.5*  2.2*   TBILI  5.9*  7.4*  6.6*   AP  100  113  142*   SGOT  37  39*  46*   ALT  18  22  27      Thyroid Studies No results for input(s): T4, T3U, TSH, TSHEXT in the last 72 hours. No lab exists for component: T3RU     Pathology pathology         No Known Allergies    Current Facility-Administered Medications   Medication Dose Route Frequency    acetaminophen (TYLENOL) tablet 650 mg  650 mg Oral Q6H PRN    insulin detemir (LEVEMIR) injection 10 Units  10 Units SubCUTAneous Q12H    benzonatate (TESSALON) capsule 100 mg  100 mg Oral TID PRN    morphine injection 2 mg  2 mg IntraVENous Q4H PRN    cefTRIAXone (ROCEPHIN) 1 g in 0.9% sodium chloride (MBP/ADV) 50 mL MBP  1 g IntraVENous Q24H    insulin lispro (HUMALOG) injection   SubCUTAneous AC&HS    sodium chloride (NS) flush 5-10 mL  5-10 mL IntraVENous PRN    lactulose (CHRONULAC) solution 10 g  10 g Oral QHS    lisinopril (PRINIVIL, ZESTRIL) tablet 20 mg  20 mg Oral DAILY    gabapentin (NEURONTIN) capsule 300 mg  300 mg Oral DAILY    glucose chewable tablet 16 g  4 Tab Oral PRN    glucagon (GLUCAGEN) injection 1 mg  1 mg IntraMUSCular PRN    dextrose (D50) infusion 12.5-25 g  25-50 mL IntraVENous PRN    ondansetron (ZOFRAN) injection 6 mg  6 mg IntraVENous Q6H PRN       ASSESSMENT:  Active Problems:    Hyperglycemia (5/19/2017)      Ascites (5/19/2017)      End stage liver disease (HCC) (5/19/2017)      HANKS cirrhosis, with ascites, rule out SBP, mild encephalopathy.  Bilirubin improved today.  Staph aureus UTI, R/O MRSA, sensitivity pending.        Continue Rocephin 1 gm daily. Blood sugar control per hospitalist.  Monitor liver and renal function. Titrate Lactulose.       Caitlin Apple MD

## 2017-05-21 NOTE — PROGRESS NOTES
Received awake,alert,in no acute distress. Call light,phone with in reach. Instructed to call for assistance for safety.

## 2017-05-21 NOTE — ROUTINE PROCESS
Bedside and Verbal shift change report given to Nadia Figueroa (oncoming nurse) by faviola fuller rn (offgoing nurse). Report given with SBAR, Kardex, Intake/Output and Recent Results.

## 2017-05-22 LAB
ALBUMIN SERPL BCP-MCNC: 2.1 G/DL (ref 3.4–5)
ALBUMIN/GLOB SERPL: 0.6 {RATIO} (ref 0.8–1.7)
ALP SERPL-CCNC: 97 U/L (ref 45–117)
ALT SERPL-CCNC: 19 U/L (ref 13–56)
ANION GAP BLD CALC-SCNC: 8 MMOL/L (ref 3–18)
AST SERPL W P-5'-P-CCNC: 41 U/L (ref 15–37)
BACTERIA SPEC CULT: ABNORMAL
BASOPHILS # BLD AUTO: 0 K/UL (ref 0–0.06)
BASOPHILS # BLD: 0 % (ref 0–2)
BILIRUB SERPL-MCNC: 5.9 MG/DL (ref 0.2–1)
BUN SERPL-MCNC: 9 MG/DL (ref 7–18)
BUN/CREAT SERPL: 13 (ref 12–20)
CALCIUM SERPL-MCNC: 8.2 MG/DL (ref 8.5–10.1)
CHLORIDE SERPL-SCNC: 99 MMOL/L (ref 100–108)
CO2 SERPL-SCNC: 31 MMOL/L (ref 21–32)
CREAT SERPL-MCNC: 0.68 MG/DL (ref 0.6–1.3)
DIFFERENTIAL METHOD BLD: ABNORMAL
EOSINOPHIL # BLD: 0.2 K/UL (ref 0–0.4)
EOSINOPHIL NFR BLD: 3 % (ref 0–5)
ERYTHROCYTE [DISTWIDTH] IN BLOOD BY AUTOMATED COUNT: 15.9 % (ref 11.6–14.5)
GLOBULIN SER CALC-MCNC: 3.5 G/DL (ref 2–4)
GLUCOSE BLD STRIP.AUTO-MCNC: 185 MG/DL (ref 70–110)
GLUCOSE BLD STRIP.AUTO-MCNC: 272 MG/DL (ref 70–110)
GLUCOSE BLD STRIP.AUTO-MCNC: 275 MG/DL (ref 70–110)
GLUCOSE BLD STRIP.AUTO-MCNC: 280 MG/DL (ref 70–110)
GLUCOSE SERPL-MCNC: 210 MG/DL (ref 74–99)
HCT VFR BLD AUTO: 30.7 % (ref 35–45)
HGB BLD-MCNC: 10.4 G/DL (ref 12–16)
LYMPHOCYTES # BLD AUTO: 21 % (ref 21–52)
LYMPHOCYTES # BLD: 1.1 K/UL (ref 0.9–3.6)
MCH RBC QN AUTO: 35.9 PG (ref 24–34)
MCHC RBC AUTO-ENTMCNC: 33.9 G/DL (ref 31–37)
MCV RBC AUTO: 105.9 FL (ref 74–97)
MONOCYTES # BLD: 0.8 K/UL (ref 0.05–1.2)
MONOCYTES NFR BLD AUTO: 16 % (ref 3–10)
NEUTS SEG # BLD: 3 K/UL (ref 1.8–8)
NEUTS SEG NFR BLD AUTO: 60 % (ref 40–73)
PLATELET # BLD AUTO: 58 K/UL (ref 135–420)
PMV BLD AUTO: 9.5 FL (ref 9.2–11.8)
POTASSIUM SERPL-SCNC: 3.1 MMOL/L (ref 3.5–5.5)
PROT SERPL-MCNC: 5.6 G/DL (ref 6.4–8.2)
RBC # BLD AUTO: 2.9 M/UL (ref 4.2–5.3)
SERVICE CMNT-IMP: ABNORMAL
SODIUM SERPL-SCNC: 138 MMOL/L (ref 136–145)
WBC # BLD AUTO: 5 K/UL (ref 4.6–13.2)

## 2017-05-22 PROCEDURE — 65270000029 HC RM PRIVATE

## 2017-05-22 PROCEDURE — 36415 COLL VENOUS BLD VENIPUNCTURE: CPT | Performed by: INTERNAL MEDICINE

## 2017-05-22 PROCEDURE — 85025 COMPLETE CBC W/AUTO DIFF WBC: CPT | Performed by: INTERNAL MEDICINE

## 2017-05-22 PROCEDURE — 82962 GLUCOSE BLOOD TEST: CPT

## 2017-05-22 PROCEDURE — 74011250636 HC RX REV CODE- 250/636: Performed by: HOSPITALIST

## 2017-05-22 PROCEDURE — 74011250636 HC RX REV CODE- 250/636: Performed by: INTERNAL MEDICINE

## 2017-05-22 PROCEDURE — 74011636637 HC RX REV CODE- 636/637: Performed by: HOSPITALIST

## 2017-05-22 PROCEDURE — 74011250637 HC RX REV CODE- 250/637: Performed by: INTERNAL MEDICINE

## 2017-05-22 PROCEDURE — 74011000258 HC RX REV CODE- 258: Performed by: INTERNAL MEDICINE

## 2017-05-22 PROCEDURE — 80053 COMPREHEN METABOLIC PANEL: CPT | Performed by: INTERNAL MEDICINE

## 2017-05-22 PROCEDURE — 74011250637 HC RX REV CODE- 250/637: Performed by: HOSPITALIST

## 2017-05-22 RX ADMIN — LACTULOSE 20 G: 20 SOLUTION ORAL at 17:25

## 2017-05-22 RX ADMIN — INSULIN LISPRO 9 UNITS: 100 INJECTION, SOLUTION INTRAVENOUS; SUBCUTANEOUS at 13:35

## 2017-05-22 RX ADMIN — Medication 2 MG: at 12:07

## 2017-05-22 RX ADMIN — GABAPENTIN 300 MG: 300 CAPSULE ORAL at 09:33

## 2017-05-22 RX ADMIN — SPIRONOLACTONE 50 MG: 25 TABLET, FILM COATED ORAL at 09:33

## 2017-05-22 RX ADMIN — LACTULOSE 10 G: 20 SOLUTION ORAL at 09:33

## 2017-05-22 RX ADMIN — LISINOPRIL 20 MG: 20 TABLET ORAL at 09:33

## 2017-05-22 RX ADMIN — CEFTRIAXONE 1 G: 1 INJECTION, POWDER, FOR SOLUTION INTRAMUSCULAR; INTRAVENOUS at 17:25

## 2017-05-22 RX ADMIN — INSULIN DETEMIR 15 UNITS: 100 INJECTION, SOLUTION SUBCUTANEOUS at 09:34

## 2017-05-22 RX ADMIN — INSULIN LISPRO 9 UNITS: 100 INJECTION, SOLUTION INTRAVENOUS; SUBCUTANEOUS at 22:32

## 2017-05-22 RX ADMIN — Medication 2 MG: at 22:32

## 2017-05-22 RX ADMIN — INSULIN LISPRO 9 UNITS: 100 INJECTION, SOLUTION INTRAVENOUS; SUBCUTANEOUS at 17:25

## 2017-05-22 RX ADMIN — INSULIN LISPRO 3 UNITS: 100 INJECTION, SOLUTION INTRAVENOUS; SUBCUTANEOUS at 09:33

## 2017-05-22 RX ADMIN — FUROSEMIDE 20 MG: 10 INJECTION, SOLUTION INTRAMUSCULAR; INTRAVENOUS at 09:33

## 2017-05-22 RX ADMIN — INSULIN DETEMIR 15 UNITS: 100 INJECTION, SOLUTION SUBCUTANEOUS at 17:25

## 2017-05-22 NOTE — PROGRESS NOTES
[facility]  Hospitalist Division        Inpatient Daily Progress Note    Daily progress Note    Patient: Pauline Hooper MRN: [id number]  CSN: [id number]    YOB: 1960  Age: 49 y.o. Sex: female    DOA: 5/19/2017 LOS:  LOS: 3 days                    Chief Complaint: HANKS Cirrhosis       Subjective:      C/o intermittent abdominal pain. In NAD. Objective:      Visit Vitals    /54    Pulse (!) 103    Temp 98.2 °F (36.8 °C)    Resp 16    Ht 5' 4\" (1.626 m)    Wt 134.3 kg (296 lb)    SpO2 95%    BMI 50.81 kg/m2         Physical Exam:  General appearance: alert, cooperative, no distress, appears stated age  Lungs: bases diminished bilaterally 2/2 body habitus   Heart: regular rate and rhythm, S1, S2 normal, no murmur, click, rub or gallop  Abdomen: soft, non distended, tender to palpation epigastric region. Normoactive bowel sounds   Extremities: extremities normal, atraumatic, no cyanosis.  2+ BLE edema   Skin: Skin color, texture, turgor normal. No rashes or lesions  Neurologic: moves all 4 extremities   PSY: appropriately behaved        Intake and Output:  Current Shift:  05/22 0701 - 05/22 1900  In: 240 [P.O.:240]  Out: 800 [Urine:800]  Last three shifts:  05/20 1901 - 05/22 0700  In: 480 [P.O.:480]  Out: 1350 [Urine:1350]    Recent Results (from the past 24 hour(s))   GLUCOSE, POC    Collection Time: 05/21/17  9:27 PM   Result Value Ref Range    Glucose (POC) 289 (H) 70 - 110 mg/dL   CBC WITH AUTOMATED DIFF    Collection Time: 05/22/17  4:14 AM   Result Value Ref Range    WBC 5.0 4.6 - 13.2 K/uL    RBC 2.90 (L) 4.20 - 5.30 M/uL    HGB 10.4 (L) 12.0 - 16.0 g/dL    HCT 30.7 (L) 35.0 - 45.0 %    .9 (H) 74.0 - 97.0 FL    MCH 35.9 (H) 24.0 - 34.0 PG    MCHC 33.9 31.0 - 37.0 g/dL    RDW 15.9 (H) 11.6 - 14.5 %    PLATELET 58 (L) 922 - 420 K/uL    MPV 9.5 9.2 - 11.8 FL    NEUTROPHILS 60 40 - 73 %    LYMPHOCYTES 21 21 - 52 %    MONOCYTES 16 (H) 3 - 10 % EOSINOPHILS 3 0 - 5 %    BASOPHILS 0 0 - 2 %    ABS. NEUTROPHILS 3.0 1.8 - 8.0 K/UL    ABS. LYMPHOCYTES 1.1 0.9 - 3.6 K/UL    ABS. MONOCYTES 0.8 0.05 - 1.2 K/UL    ABS. EOSINOPHILS 0.2 0.0 - 0.4 K/UL    ABS. BASOPHILS 0.0 0.0 - 0.06 K/UL    DF AUTOMATED     METABOLIC PANEL, COMPREHENSIVE    Collection Time: 05/22/17  4:14 AM   Result Value Ref Range    Sodium 138 136 - 145 mmol/L    Potassium 3.1 (L) 3.5 - 5.5 mmol/L    Chloride 99 (L) 100 - 108 mmol/L    CO2 31 21 - 32 mmol/L    Anion gap 8 3.0 - 18 mmol/L    Glucose 210 (H) 74 - 99 mg/dL    BUN 9 7.0 - 18 MG/DL    Creatinine 0.68 0.6 - 1.3 MG/DL    BUN/Creatinine ratio 13 12 - 20      GFR est AA >60 >60 ml/min/1.73m2    GFR est non-AA >60 >60 ml/min/1.73m2    Calcium 8.2 (L) 8.5 - 10.1 MG/DL    Bilirubin, total 5.9 (H) 0.2 - 1.0 MG/DL    ALT (SGPT) 19 13 - 56 U/L    AST (SGOT) 41 (H) 15 - 37 U/L    Alk.  phosphatase 97 45 - 117 U/L    Protein, total 5.6 (L) 6.4 - 8.2 g/dL    Albumin 2.1 (L) 3.4 - 5.0 g/dL    Globulin 3.5 2.0 - 4.0 g/dL    A-G Ratio 0.6 (L) 0.8 - 1.7     GLUCOSE, POC    Collection Time: 05/22/17  7:54 AM   Result Value Ref Range    Glucose (POC) 185 (H) 70 - 110 mg/dL   GLUCOSE, POC    Collection Time: 05/22/17 11:40 AM   Result Value Ref Range    Glucose (POC) 275 (H) 70 - 110 mg/dL   GLUCOSE, POC    Collection Time: 05/22/17  4:57 PM   Result Value Ref Range    Glucose (POC) 280 (H) 70 - 110 mg/dL           Lab Results   Component Value Date/Time    Glucose 210 05/22/2017 04:14 AM    Glucose 231 05/21/2017 04:45 AM    Glucose 305 05/20/2017 08:05 AM    Glucose 411 05/19/2017 02:36 PM    Glucose 226 01/31/2017 12:00 AM        Assessment/Plan:     Patient Active Problem List   Diagnosis Code    Acquired absence of organ, genital organs Z90.79    Acalculous cholecystitis K81.9    Liver cirrhosis secondary to HANKS (nonalcoholic steatohepatitis) (HCC) K75.81, K74.60    Candidal intertrigo B37.2    Diabetes mellitus type 2, controlled ([facility] E11.9  HTN (hypertension) I10    Obesity E66.9    Hyperglycemia R73.9    Ascites R18.8    End stage liver disease (HCC) K72.90       A/P:  HANKS Cirrhosis: GI following. Continue Lactulose, Diuretics  HTN: Lisinopril   DM: SSI and Levemir. Monitor glucose control. Diabetic Diet   UTI: Urine culture grew out Staph Aureus. Continue Rocephin   Obesity; BMI 50.8   DVT prophylaxis: SCDs  Dispo: homoe tomorrow. Will need follow up with Dr. Sybil Augustin for ongoing transplant evaluation       Kali Zavala, [facility]  Pager:  [phone]  Office:  [phone]    I examined the patient , reviewed the history, labs, and radiology reports  independently. I have reviewed the NP documentation, agree with the documentation, medical decision making and treatment plan as outlined by my NP.     Noe Law MD

## 2017-05-22 NOTE — PROGRESS NOTES
Gastrointestinal Progress Note    Patient Name: Terence Weber    AUHLD'C Date: 5/22/2017    Admit Date: 5/19/2017    Impression:  -HANKS Cirrhosis, new decompensation with development of ascites; MELD 18  -Anasarca  -Intra-abdominal varices, no esophagea/gastric varices on EGD Jan2017  -Anemia, thrombocytopenia  -Mild BAYRON (creat 1 from recent baseline 0.45-0.69)  -Hyperglycemia  -Tachycardia  -Urinary tract infection  -Report of mild confusion, no overt encephalopathy on exam  -Report of intermittent bleeding from umbilicus; resolved    Recommendations:  -Continue lactulose, will increase to 20 grams tid  -Continue diuretics  -Low sodium diet as tolerated  -Encourage mobility  -Continue rocephin for MSSA UTI (and r/o SBP); would complete at least 5 days of therapy and could change to FQ as an outpatient   -Will need outpatient f/u with Dr Justa Herrera as outpatient for ongoing transplant evaluation    Subjective:     Terence Weber is a 49 y.o. seen for decompensated HANKS cirrhosis. No complaints today. Some abdominal discomfort at chronic baseline. Had soft stool without blood today. No vomiting. Tolerating PO. Able to get to the chair from bed but overall feels weak.      Current Facility-Administered Medications   Medication Dose Route Frequency    acetaminophen (TYLENOL) tablet 650 mg  650 mg Oral Q6H PRN    lactulose (CHRONULAC) solution 10 g  10 g Oral TID    spironolactone (ALDACTONE) tablet 50 mg  50 mg Oral DAILY    furosemide (LASIX) injection 20 mg  20 mg IntraVENous DAILY    insulin detemir (LEVEMIR) injection 15 Units  15 Units SubCUTAneous Q12H    benzonatate (TESSALON) capsule 100 mg  100 mg Oral TID PRN    morphine injection 2 mg  2 mg IntraVENous Q4H PRN    cefTRIAXone (ROCEPHIN) 1 g in 0.9% sodium chloride (MBP/ADV) 50 mL MBP  1 g IntraVENous Q24H    insulin lispro (HUMALOG) injection   SubCUTAneous AC&HS    sodium chloride (NS) flush 5-10 mL  5-10 mL IntraVENous PRN    lisinopril (PRINIVIL, ZESTRIL) tablet 20 mg  20 mg Oral DAILY    gabapentin (NEURONTIN) capsule 300 mg  300 mg Oral DAILY    glucose chewable tablet 16 g  4 Tab Oral PRN    glucagon (GLUCAGEN) injection 1 mg  1 mg IntraMUSCular PRN    dextrose (D50) infusion 12.5-25 g  25-50 mL IntraVENous PRN    ondansetron (ZOFRAN) injection 6 mg  6 mg IntraVENous Q6H PRN          Objective:     Physical Exam:    Visit Vitals    /54    Pulse (!) 103    Temp 98.2 °F (36.8 °C)    Resp 16    Ht 5' 4\" (1.626 m)    Wt 134.3 kg (296 lb)    LMP 04/25/2011    SpO2 95%    BMI 50.81 kg/m2     Gen: AAOx3, Chronic ill appearing but NAD  CV: RRR, no M/R/G  Pulm: CTA B/L  Abd:Soft, NT/ND, + BS, obese  Extr: Trace ankle edema    Data Review:    Labs: Results:       Chemistry Recent Labs      05/22/17 0414 05/21/17 0445 05/20/17   0805   GLU  210*  231*  305*   NA  138  137  136   K  3.1*  3.3*  3.5   CL  99*  98*  98*   CO2  31  31  31   BUN  9  8  6*   CREA  0.68  0.65  0.71   CA  8.2*  7.9*  8.4*   AGAP  8  8  7   BUCR  13  12  8*   AP  97  100  113   TP  5.6*  5.7*  6.4   ALB  2.1*  2.1*  2.5*   GLOB  3.5  3.6  3.9   AGRAT  0.6*  0.6*  0.6*      CBC w/Diff Recent Labs      05/22/17 0414 05/21/17 0445 05/20/17   0805   WBC  5.0  5.9  5.2   RBC  2.90*  2.83*  3.04*   HGB  10.4*  10.3*  11.1*   HCT  30.7*  30.1*  32.2*   PLT  58*  56*  61*   GRANS  60  68  66   LYMPH  21  14*  17*   EOS  3  2  3      Coagulation Recent Labs      05/21/17 0445   PTP  19.5*   INR  1.7*       Liver Enzymes Recent Labs      05/22/17 0414   TP  5.6*   ALB  2.1*   AP  97   SGOT  41*   ALT  19            Maciej Porch, MD  May 22, 2017

## 2017-05-22 NOTE — PROGRESS NOTES
Received in no acute distress. Call light,phone with in reach. Instructed to call for assistance for safety. 2100 Family members visiting.  2256 Tessalon 100 mg po given for coughing. 2355 Pt sleeping,no coughing noted.

## 2017-05-22 NOTE — DIABETES MGMT
NUTRITIONAL ASSESSMENT GLYCEMIC CONTROL/ PLAN OF CARE     Cristian Zacarias           49 y.o.           5/19/2017                 1. HANKS (nonalcoholic steatohepatitis)    2. Controlled type 2 diabetes mellitus with complication, without long-term current use of insulin ([facility] [address]    3. Transaminitis    4. Anemia, unspecified type    5. Hypomagnesemia    6. Acute cystitis with hematuria    7. Cirrhosis of liver with ascites, unspecified hepatic cirrhosis type       INTERVENTIONS/PLAN:   1. Suggest increasing Levemir to 15 units every 12 hours  2. Pt provided with new Freestyle Lite glucometer/starter kit. 3.  Monitor po intake, labs and weights. ASSESSMENT:   Nutritional Status:  Pt is 247% ideal wt; BMI (calculated): 50.8 kg/m2 (morbidly obese weight classification). Pt with hypoalbuminemia, likely related to ESLD as evidenced by albumin of 2.1. Current po intake appears adequate with pt taking 100%  breakfast this morning per I and O's. Diabetes Management:   Pt reports she has an old meter at home but is out of test strips and has not been able to obtain test strips from her pharmacy because her meter is an old model and her pharmacy does not carry the test strips for it. Has not been checking her BG for the past couple of weeks. States her usual readings were around 140 and \"never over  200\". Elevated blood glucose yesterday and today. Recent blood glucose:    5/22/17:  185, 275  5/21/17;  221, 311, 278, 289 - pt received 53 units insulin (17 units Levemir and 36 units corrective lispro). Within target range (non-ICU: <140; ICU<180): [] Yes   [x]  No    Current Insulin regimen:   Levemir 10 units q 12 hours  Corrective lispro ACHS, very insulin resistant dosing  Home medication/insulin regimen:   Metformin 500 mg BID  Januvia 100 mg/d  HbA1c: 8.3% - ave bG has been ~ 189 mg/dL over past 3 months.   Adequate glycemic control PTA:  [x] Yes, considering co-morbidities  [] No     SUBJECTIVE/OBJECTIVE: Information obtained from: chart review, pt  Pt reports having a poor appetite PTA. States she has lost weight, reported her usual wt as 296# and that she last weighed this amount 2 weeks ago. She thinks she has since lost weight but is unable to quantify. Current weight in flow sheets appears to have  been reported by pt. Pt denies having food allergies. No problems with chewing or swallowing.     Diet: consistent CHO, 5280-3980 calories, mechanical soft    Patient Vitals for the past 100 hrs:   % Diet Eaten   05/22/17 1029 100 %   05/20/17 0926 0 %       Medications: [x]                Reviewed   Pertinent:  Lasix    Most Recent POC Glucose: Recent Labs      05/22/17   0414  05/21/17   0445  05/20/17   0805   GLU  210*  231*  305*       Labs:   Lab Results   Component Value Date/Time    Hemoglobin A1c 8.3 05/19/2017 02:36 PM     Lab Results   Component Value Date/Time    Sodium 138 05/22/2017 04:14 AM    Potassium 3.1 05/22/2017 04:14 AM    Chloride 99 05/22/2017 04:14 AM    CO2 31 05/22/2017 04:14 AM    Anion gap 8 05/22/2017 04:14 AM    Glucose 210 05/22/2017 04:14 AM    BUN 9 05/22/2017 04:14 AM    Creatinine 0.68 05/22/2017 04:14 AM    Calcium 8.2 05/22/2017 04:14 AM    Magnesium 1.5 05/19/2017 02:36 PM    Albumin 2.1 05/22/2017 04:14 AM   .lastl    Anthropometrics: IBW : 54.4 kg (120 lb), % IBW (Calculated): 246.67 %, BMI (calculated): 50.8  Wt Readings from Last 1 Encounters:   05/19/17 134.3 kg (296 lb)    Ht Readings from Last 1 Encounters:   05/19/17 5' 4\" (1.626 m)     Liver Panel:    Lab Results   Component Value Date/Time    ALB 2.1 (L) 05/22/2017 04:14 AM    TBILI 5.9 (H) 05/22/2017 04:14 AM    TP 5.6 (L) 05/22/2017 04:14 AM    GLOB 3.5 05/22/2017 04:14 AM    AGRAT 0.6 (L) 05/22/2017 04:14 AM    SGOT 41 (H) 05/22/2017 04:14 AM    ALT 19 05/22/2017 04:14 AM    AP 97 05/22/2017 04:14 AM       Estimated Nutrition Needs:  7645 Kcals/day, Protein (g): 83 g Fluid (ml): 1800 ml  Based on:   [x] Actual BW    []          ABW   []            Adjusted BW        Nutrition Diagnoses: Morbid obesity due to excessive energy intake PTA as evidenced by BMI of 50.8 kg/m2. Altered nutrition related labs due to diabetes and ESLD as evidenced by A1C of 8.3% and albumin of 2.1. Nutrition Interventions:  CHO consistent diet  Goal:   Blood glucose will be within target range of  mg/dL by 5/25/17  Pt will consume >75% estimated energy needs by 5/27/17.      Nutrition Monitoring and Evaluation      [x]     Monitor po intake on meal rounds  [x]     Continue inpatient monitoring and intervention  []     Other:      Nutrition Risk:  []   High     [x]  Moderate    []  Minimal/Uncompromised    Monse Simmons RD, CDE   Office:  [address] [place] [address] Pager:  [phone]

## 2017-05-22 NOTE — ROUTINE PROCESS
Bedside / verbal shift change report given to  Jie NICOLE (oncoming nurse) by Scar Marrero RN (offgoing nurse). Report included the following information SBAR, Kardex, Intake/Output, MAR and Recent Results.

## 2017-05-22 NOTE — PROGRESS NOTES
completed follow up visit with patient in room 2113 at around 1320 today. Spiritual assessment of patient and offered Pastoral care support. .  Patient had requested information about an advance directive, form was explained and left with her to consider.    Chaplains will continue to follow and will provide pastoral care on an as needed/requested basis    Chaplain Chuy Perez   Board Certified [address] [place] [address]   [phone]

## 2017-05-22 NOTE — PROGRESS NOTES
1912: Bedside verbal shift report received with Roland Vásquez RN (preceptor) from [facility]. Pt is awake in bed, no signs of distress, instructed to press call light if assistance is needed, call light within reach. 0740: Bedside and Verbal shift change report given to BONNIE Cortez (oncoming nurse) by Margaux Bender RN (offgoing nurse) and Roland Vásquez RN (preceptor).  Report included the following information SBAR, Kardex, Intake/Output, MAR and Recent Results

## 2017-05-22 NOTE — PROGRESS NOTES
Chart reviewed. Plan is home when stable, likely need Home Health PT. And [facility] for home care. Will monitor.

## 2017-05-22 NOTE — PROGRESS NOTES
Progress Note      Patient: Evie Cervantes               Sex: female          DOA: 5/19/2017       YOB: 1960      Age:  49 y.o.        LOS:  LOS: 2 days               Subjective:   Pt says that she has been enquiring about a liver transplant . Her abdominal pain is better controlled today . Her paracentesis could not be done as noted by gi . The pt has HANKS cirrhosis and has ascites which could not be reached . She is growing out staph in her urine sensitivities pending and if mrsa the rocephin will be dc'd and vancomycin will be used her k is 3.3. Pt is on aldactone      Objective:      Visit Vitals    /76 (BP 1 Location: Left arm, BP Patient Position: At rest)    Pulse (!) 108    Temp 98.6 °F (37 °C)    Resp 14    Ht 5' 4\" (1.626 m)    Wt 134.3 kg (296 lb)    SpO2 99%    BMI 50.81 kg/m2       Physical Exam:  Pt is awake and appears to be more comfortable today   Heart reg rate and rhythm   Lungs fair breath sounds heard   Abdomen soft and obese .    Neuro nonfocal        Lab/Data Reviewed:  CMP:   Lab Results   Component Value Date/Time     05/21/2017 04:45 AM    K 3.3 (L) 05/21/2017 04:45 AM    CL 98 (L) 05/21/2017 04:45 AM    CO2 31 05/21/2017 04:45 AM    AGAP 8 05/21/2017 04:45 AM     (H) 05/21/2017 04:45 AM    BUN 8 05/21/2017 04:45 AM    CREA 0.65 05/21/2017 04:45 AM    GFRAA >60 05/21/2017 04:45 AM    GFRNA >60 05/21/2017 04:45 AM    CA 7.9 (L) 05/21/2017 04:45 AM    ALB 2.1 (L) 05/21/2017 04:45 AM    TP 5.7 (L) 05/21/2017 04:45 AM    GLOB 3.6 05/21/2017 04:45 AM    AGRAT 0.6 (L) 05/21/2017 04:45 AM    SGOT 37 05/21/2017 04:45 AM    ALT 18 05/21/2017 04:45 AM     CBC:   Lab Results   Component Value Date/Time    WBC 5.9 05/21/2017 04:45 AM    HGB 10.3 (L) 05/21/2017 04:45 AM    HCT 30.1 (L) 05/21/2017 04:45 AM    PLT 56 (L) 05/21/2017 04:45 AM           Assessment/Plan     Active Problems:    Hyperglycemia (5/19/2017)      Ascites (5/19/2017)      End stage liver disease ([facility] (5/19/2017)  HANKS with cirrhosis and ascites    uti the patient is growing out staph .sensitivities pending   Diabetes mellitus   Plan:will increase the levemir to 15 units q 12 hrs .

## 2017-05-23 VITALS
HEART RATE: 105 BPM | TEMPERATURE: 98.2 F | WEIGHT: 293 LBS | SYSTOLIC BLOOD PRESSURE: 113 MMHG | DIASTOLIC BLOOD PRESSURE: 71 MMHG | BODY MASS INDEX: 50.02 KG/M2 | OXYGEN SATURATION: 97 % | RESPIRATION RATE: 18 BRPM | HEIGHT: 64 IN

## 2017-05-23 LAB
AMMONIA PLAS-SCNC: 116 UMOL/L (ref 11–32)
ANION GAP BLD CALC-SCNC: 7 MMOL/L (ref 3–18)
BUN SERPL-MCNC: 10 MG/DL (ref 7–18)
BUN/CREAT SERPL: 14 (ref 12–20)
CALCIUM SERPL-MCNC: 8.3 MG/DL (ref 8.5–10.1)
CHLORIDE SERPL-SCNC: 100 MMOL/L (ref 100–108)
CO2 SERPL-SCNC: 31 MMOL/L (ref 21–32)
CREAT SERPL-MCNC: 0.69 MG/DL (ref 0.6–1.3)
GLUCOSE BLD STRIP.AUTO-MCNC: 132 MG/DL (ref 70–110)
GLUCOSE BLD STRIP.AUTO-MCNC: 272 MG/DL (ref 70–110)
GLUCOSE SERPL-MCNC: 131 MG/DL (ref 74–99)
POTASSIUM SERPL-SCNC: 3.2 MMOL/L (ref 3.5–5.5)
SODIUM SERPL-SCNC: 138 MMOL/L (ref 136–145)

## 2017-05-23 PROCEDURE — 74011250637 HC RX REV CODE- 250/637: Performed by: INTERNAL MEDICINE

## 2017-05-23 PROCEDURE — 74011250637 HC RX REV CODE- 250/637: Performed by: NURSE PRACTITIONER

## 2017-05-23 PROCEDURE — 74011636637 HC RX REV CODE- 636/637: Performed by: INTERNAL MEDICINE

## 2017-05-23 PROCEDURE — 74011250636 HC RX REV CODE- 250/636: Performed by: HOSPITALIST

## 2017-05-23 PROCEDURE — 82140 ASSAY OF AMMONIA: CPT | Performed by: NURSE PRACTITIONER

## 2017-05-23 PROCEDURE — 74011250636 HC RX REV CODE- 250/636: Performed by: INTERNAL MEDICINE

## 2017-05-23 PROCEDURE — 74011250637 HC RX REV CODE- 250/637: Performed by: HOSPITALIST

## 2017-05-23 PROCEDURE — 36415 COLL VENOUS BLD VENIPUNCTURE: CPT | Performed by: NURSE PRACTITIONER

## 2017-05-23 PROCEDURE — 80048 BASIC METABOLIC PNL TOTAL CA: CPT | Performed by: NURSE PRACTITIONER

## 2017-05-23 PROCEDURE — 82962 GLUCOSE BLOOD TEST: CPT

## 2017-05-23 RX ORDER — POTASSIUM CHLORIDE 750 MG/1
10 TABLET, FILM COATED, EXTENDED RELEASE ORAL DAILY
Qty: 30 TAB | Refills: 0 | Status: SHIPPED | OUTPATIENT
Start: 2017-05-23 | End: 2017-08-09

## 2017-05-23 RX ORDER — POTASSIUM CHLORIDE 750 MG/1
10 TABLET, FILM COATED, EXTENDED RELEASE ORAL DAILY
Qty: 30 TAB | Refills: 0 | Status: SHIPPED
Start: 2017-05-23 | End: 2017-05-23

## 2017-05-23 RX ORDER — SPIRONOLACTONE 50 MG/1
50 TABLET, FILM COATED ORAL DAILY
Qty: 30 TAB | Refills: 0 | Status: SHIPPED
Start: 2017-05-23 | End: 2017-05-23

## 2017-05-23 RX ORDER — SPIRONOLACTONE 50 MG/1
50 TABLET, FILM COATED ORAL DAILY
Qty: 30 TAB | Refills: 0 | Status: SHIPPED | OUTPATIENT
Start: 2017-05-23 | End: 2017-10-06 | Stop reason: SDUPTHER

## 2017-05-23 RX ORDER — POTASSIUM CHLORIDE 20 MEQ/1
40 TABLET, EXTENDED RELEASE ORAL 2 TIMES DAILY
Status: DISCONTINUED | OUTPATIENT
Start: 2017-05-23 | End: 2017-05-23 | Stop reason: HOSPADM

## 2017-05-23 RX ORDER — POTASSIUM CHLORIDE 750 MG/1
10 TABLET, FILM COATED, EXTENDED RELEASE ORAL DAILY
Qty: 30 TAB | Refills: 0 | Status: SHIPPED | OUTPATIENT
Start: 2017-05-23 | End: 2017-05-23

## 2017-05-23 RX ORDER — SPIRONOLACTONE 50 MG/1
50 TABLET, FILM COATED ORAL DAILY
Qty: 30 TAB | Refills: 0 | Status: SHIPPED | OUTPATIENT
Start: 2017-05-23 | End: 2017-05-23

## 2017-05-23 RX ADMIN — POTASSIUM CHLORIDE 40 MEQ: 20 TABLET, EXTENDED RELEASE ORAL at 10:57

## 2017-05-23 RX ADMIN — FUROSEMIDE 20 MG: 10 INJECTION, SOLUTION INTRAMUSCULAR; INTRAVENOUS at 10:59

## 2017-05-23 RX ADMIN — INSULIN DETEMIR 17 UNITS: 100 INJECTION, SOLUTION SUBCUTANEOUS at 10:58

## 2017-05-23 RX ADMIN — LISINOPRIL 20 MG: 20 TABLET ORAL at 10:57

## 2017-05-23 RX ADMIN — GABAPENTIN 300 MG: 300 CAPSULE ORAL at 09:00

## 2017-05-23 RX ADMIN — Medication 2 MG: at 04:49

## 2017-05-23 RX ADMIN — SPIRONOLACTONE 50 MG: 25 TABLET, FILM COATED ORAL at 10:57

## 2017-05-23 NOTE — DISCHARGE INSTRUCTIONS
DISCHARGE SUMMARY from Nurse    The following personal items are in your possession at time of discharge:    Dental Appliances: None  Visual Aid: None     Home Medications: None  Jewelry: Sent home  Clothing: Dress, Undergarments  Other Valuables: Cell Phone, Purse             PATIENT INSTRUCTIONS:    After general anesthesia or intravenous sedation, for 24 hours or while taking prescription Narcotics:  · Limit your activities  · Do not drive and operate hazardous machinery  · Do not make important personal or business decisions  · Do  not drink alcoholic beverages  · If you have not urinated within 8 hours after discharge, please contact your surgeon on call. Report the following to your surgeon:  · Excessive pain, swelling, redness or odor of or around the surgical area  · Temperature over 100.5  · Nausea and vomiting lasting longer than 4 hours or if unable to take medications  · Any signs of decreased circulation or nerve impairment to extremity: change in color, persistent  numbness, tingling, coldness or increase pain  · Any questions        What to do at Home:  Recommended activity: as physician advised    If you experience any of the following symptoms listed under Warning Signs of a Heart Attack, Signs and Symptoms of a Stroke and \"When to Call for Help\" listed under discharge teaching, please follow up with your primary care physician and/or call 911. *  Please give a list of your current medications to your Primary Care Provider. *  Please update this list whenever your medications are discontinued, doses are      changed, or new medications (including over-the-counter products) are added. *  Please carry medication information at all times in case of emergency situations.           These are general instructions for a healthy lifestyle:    No smoking/ No tobacco products/ Avoid exposure to second hand smoke    Surgeon General's Warning:  Quitting smoking now greatly reduces serious risk to your health. Obesity, smoking, and sedentary lifestyle greatly increases your risk for illness    A healthy diet, regular physical exercise & weight monitoring are important for maintaining a healthy lifestyle    You may be retaining fluid if you have a history of heart failure or if you experience any of the following symptoms:  Weight gain of 3 pounds or more overnight or 5 pounds in a week, increased swelling in our hands or feet or shortness of breath while lying flat in bed. Please call your doctor as soon as you notice any of these symptoms; do not wait until your next office visit. Recognize signs and symptoms of STROKE:    F-face looks uneven    A-arms unable to move or move unevenly    S-speech slurred or non-existent    T-time-call 911 as soon as signs and symptoms begin-DO NOT go       Back to bed or wait to see if you get better-TIME IS BRAIN. Warning Signs of HEART ATTACK     Call 911 if you have these symptoms:   Chest discomfort. Most heart attacks involve discomfort in the center of the chest that lasts more than a few minutes, or that goes away and comes back. It can feel like uncomfortable pressure, squeezing, fullness, or pain.  Discomfort in other areas of the upper body. Symptoms can include pain or discomfort in one or both arms, the back, neck, jaw, or stomach.  Shortness of breath with or without chest discomfort.  Other signs may include breaking out in a cold sweat, nausea, or lightheadedness. Don't wait more than five minutes to call 911 - MINUTES MATTER! Fast action can save your life. Calling 911 is almost always the fastest way to get lifesaving treatment. Emergency Medical Services staff can begin treatment when they arrive -- up to an hour sooner than if someone gets to the hospital by car. The discharge information has been reviewed with the patient. The patient verbalized understanding.     Discharge medications reviewed with the patient and appropriate educational materials and side effects teaching were provided. Diabetes Foot Health: Care Instructions  Your Care Instructions    When you have diabetes, your feet need extra care and attention. Diabetes can damage the nerve endings and blood vessels in your feet, making you less likely to notice when your feet are injured. Diabetes also limits your body's ability to fight infection and get blood to areas that need it. If you get a minor foot injury, it could become an ulcer or a serious infection. With good foot care, you can prevent most of these problems. Caring for your feet can be quick and easy. Most of the care can be done when you are bathing or getting ready for bed. Follow-up care is a key part of your treatment and safety. Be sure to make and go to all appointments, and call your doctor if you are having problems. Its also a good idea to know your test results and keep a list of the medicines you take. How can you care for yourself at home? · Keep your blood sugar close to normal by watching what and how much you eat, monitoring blood sugar, taking medicines if prescribed, and getting regular exercise. · Do not smoke. Smoking affects blood flow and can make foot problems worse. If you need help quitting, talk to your doctor about stop-smoking programs and medicines. These can increase your chances of quitting for good. · Eat a diet that is low in fats. High fat intake can cause fat to build up in your blood vessels and decrease blood flow. · Inspect your feet daily for blisters, cuts, cracks, or sores. If you cannot see well, use a mirror or have someone help you. · Take care of your feet:  [facility] your feet every day. Use warm (not hot) water. Check the water temperature with your wrists or other part of your body, not your feet. ¨ Dry your feet well. Pat them dry. Do not rub the skin on your feet too hard. Dry well between your toes.  If the skin on your feet stays moist, bacteria or a fungus can grow, which can lead to infection. ¨ Keep your skin soft. Use moisturizing skin cream to keep the skin on your feet soft and prevent calluses and cracks. But do not put the cream between your toes, and stop using any cream that causes a rash. ¨ Clean underneath your toenails carefully. Do not use a sharp object to clean underneath your toenails. Use the blunt end of a nail file or other rounded tool. ¨ Trim and file your toenails straight across to prevent ingrown toenails. Use a nail clipper, not scissors. Use an emery board to smooth the edges. · Change socks daily. Socks without seams are best, because seams often rub the feet. You can find socks for people with diabetes from specialty catalogs. · Look inside your shoes every day for things like gravel or torn linings, which could cause blisters or sores. · Buy shoes that fit well:  ¨ Look for shoes that have plenty of space around the toes. This helps prevent bunions and blisters. ¨ Try on shoes while wearing the kind of socks you will usually wear with the shoes. ¨ Avoid plastic shoes. They may rub your feet and cause blisters. Good shoes should be made of materials that are flexible and breathable, such as leather or cloth. ¨ Break in new shoes slowly by wearing them for no more than an hour a day for several days. Take extra time to check your feet for red areas, blisters, or other problems after you wear new shoes. · Do not go barefoot. Do not wear sandals, and do not wear shoes with very thin soles. Thin soles are easy to puncture. They also do not protect your feet from hot pavement or cold weather. · Have your doctor check your feet during each visit. If you have a foot problem, see your doctor. Do not try to treat an early foot problem at home. Home remedies or treatments that you can buy without a prescription (such as corn removers) can be harmful. · Always get early treatment for foot problems.  A minor irritation can lead to a major problem if not properly cared for early. When should you call for help? Call your doctor now or seek immediate medical care if:  · You have a foot sore, an ulcer or break in the skin that is not healing after 4 days, bleeding corns or calluses, or an ingrown toenail. · You have blue or black areas, which can mean bruising or blood flow problems. · You have peeling skin or tiny blisters between your toes or cracking or oozing of the skin. · You have a fever for more than 24 hours and a foot sore. · You have new numbness or tingling in your feet that does not go away after you move your feet or change positions. · You have unexplained or unusual swelling of the foot or ankle. Watch closely for changes in your health, and be sure to contact your doctor if:  · You cannot do proper foot care. Where can you learn more? Go to http://adela-santhosh.info/. Enter A739 in the search box to learn more about \"Diabetes Foot Health: Care Instructions. \"  Current as of: May 23, 2016  Content Version: 11.2  © 6772-7636 [employer]. Care instructions adapted under license by [employer] (which disclaims liability or warranty for this information). If you have questions about a medical condition or this instruction, always ask your healthcare professional. [address] any warranty or liability for your use of this information. Diabetes and Preventing Falls: Care Instructions  Your Care Instructions  If you are an older adult who has diabetes, you may have a higher risk of falling. Complications of diabetes--such as nerve damage, foot problems, and reduced vision--may increase your risk of a fall. Some of your medicines also may add to your risk. By making your home safer, you can lower your risk of falling. Doing things to prevent diabetes complications may also help to lower your risk.   You can make your home safer with a few simple measures. Follow-up care is a key part of your treatment and safety. Be sure to make and go to all appointments, and call your doctor if you are having problems. It's also a good idea to know your test results and keep a list of the medicines you take. How can you care for yourself at home? Taking care of yourself  · Keep your blood sugar at a target level (which you set with your doctor). · Exercise regularly to improve your strength, muscle tone, and balance. Walk if you can. Swimming may be a good choice if you cannot walk easily. · Have your vision checked as often as your doctor recommends. It is usually once a year or more often if you have eye problems. · Know the side effects of the medicines you take. Ask your doctor or pharmacist whether the medicines you take can affect your balance. Sleeping pills or sedatives can affect your balance. · Limit the amount of alcohol you drink. Alcohol can impair your balance and other senses. · Have your doctor check your feet during each visit. If you have a foot problem, see your doctor. Preventing falls at home  · Remove raised doorway thresholds, throw rugs, and clutter. Repair loose carpet or raised areas in the floor. · Move furniture and electrical cords to keep them out of walking paths. · Use nonskid floor wax, and wipe up spills right away, especially on ceramic tile floors. · If you use a walker or cane, put rubber tips on it. If you use crutches, clean the bottoms of them regularly with an abrasive pad, such as steel wool. · Keep your house well lit, especially Jolan Spivey, and outside walkways. Use night-lights in areas such as hallways and bathrooms. Add extra light switches or use remote switches (such as switches that go on or off when you clap your hands) to make it easier to turn lights on if you have to get up during the night. · Install sturdy handrails on stairways. Put grab bars near your shower, bathtub, and toilet.   · Store household items on low shelves so that you do not have to climb or reach high. Or use a reaching device that you can get at a medical supply store. If you have to climb for something, use a step stool with handrails, or ask someone to get it for you. · Keep a cordless phone and a flashlight with new batteries by your bed. If possible, put a phone in each of the main rooms of your house, or carry a cell phone in case you fall and cannot reach a phone. Or you can wear a device around your neck or wrist. You push a button that sends a signal for help. · Wear low-heeled shoes that fit well and give your feet good support. Use footwear with nonskid soles. Check the heels and soles of your shoes for wear. Repair or replace worn heels or soles. · Do not wear socks without shoes on wood floors. · Walk on the grass when the sidewalks are slippery. If you live in an area that gets snow and ice in the winter, sprinkle salt on slippery steps and sidewalks. Where can you learn more? Go to http://adela-santhosh.info/. Enter K582 in the search box to learn more about \"Diabetes and Preventing Falls: Care Instructions. \"  Current as of: August 4, 2016  Content Version: 11.2  © 5645-1553 [employer]. Care instructions adapted under license by [employer] (which disclaims liability or warranty for this information). If you have questions about a medical condition or this instruction, always ask your healthcare professional. [place] [address] any warranty or liability for your use of this information. High Blood Pressure: Care Instructions  Your Care Instructions  If your blood pressure is usually above 140/90, you have high blood pressure, or hypertension. That means the top number is 140 or higher or the bottom number is 90 or higher, or both.   Despite what a lot of people think, high blood pressure usually doesn't cause headaches or make you feel dizzy or lightheaded. It usually has no symptoms. But it does increase your risk for heart attack, stroke, and kidney or eye damage. The higher your blood pressure, the more your risk increases. Your doctor will give you a goal for your blood pressure. Your goal will be based on your health and your age. An example of a goal is to keep your blood pressure below 140/90. Lifestyle changes, such as eating healthy and being active, are always important to help lower blood pressure. You might also take medicine to reach your blood pressure goal.  Follow-up care is a key part of your treatment and safety. Be sure to make and go to all appointments, and call your doctor if you are having problems. It's also a good idea to know your test results and keep a list of the medicines you take. How can you care for yourself at home? Medical treatment  · If you stop taking your medicine, your blood pressure will go back up. You may take one or more types of medicine to lower your blood pressure. Be safe with medicines. Take your medicine exactly as prescribed. Call your doctor if you think you are having a problem with your medicine. · Talk to your doctor before you start taking aspirin every day. Aspirin can help certain people lower their risk of a heart attack or stroke. But taking aspirin isn't right for everyone, because it can cause serious bleeding. · See your doctor regularly. You may need to see the doctor more often at first or until your blood pressure comes down. · If you are taking blood pressure medicine, talk to your doctor before you take decongestants or anti-inflammatory medicine, such as ibuprofen. Some of these medicines can raise blood pressure. · Learn how to check your blood pressure at home. Lifestyle changes  · Stay at a healthy weight. This is especially important if you put on weight around the waist. Losing even 10 pounds can help you lower your blood pressure.   · If your doctor recommends it, get more exercise. Walking is a good choice. Bit by bit, increase the amount you walk every day. Try for at least 30 minutes on most days of the week. You also may want to swim, bike, or do other activities. · Avoid or limit alcohol. Talk to your doctor about whether you can drink any alcohol. · Try to limit how much sodium you eat to less than 2,300 milligrams (mg) a day. Your doctor may ask you to try to eat less than 1,500 mg a day. · Eat plenty of fruits (such as bananas and oranges), vegetables, legumes, whole grains, and low-fat dairy products. · Lower the amount of saturated fat in your diet. Saturated fat is found in animal products such as milk, cheese, and meat. Limiting these foods may help you lose weight and also lower your risk for heart disease. · Do not smoke. Smoking increases your risk for heart attack and stroke. If you need help quitting, talk to your doctor about stop-smoking programs and medicines. These can increase your chances of quitting for good. When should you call for help? Call 911 anytime you think you may need emergency care. This may mean having symptoms that suggest that your blood pressure is causing a serious heart or blood vessel problem. Your blood pressure may be over 180/110. For example, call 911 if:  · You have symptoms of a heart attack. These may include:  ¨ Chest pain or pressure, or a strange feeling in the chest.  ¨ Sweating. ¨ Shortness of breath. ¨ Nausea or vomiting. ¨ Pain, pressure, or a strange feeling in the back, neck, jaw, or upper belly or in one or both shoulders or arms. ¨ Lightheadedness or sudden weakness. ¨ A fast or irregular heartbeat. · You have symptoms of a stroke. These may include:  ¨ Sudden numbness, tingling, weakness, or loss of movement in your face, arm, or leg, especially on only one side of your body. ¨ Sudden vision changes. ¨ Sudden trouble speaking. ¨ Sudden confusion or trouble understanding simple statements.   ¨ Sudden problems with walking or balance. ¨ A sudden, severe headache that is different from past headaches. · You have severe back or belly pain. Do not wait until your blood pressure comes down on its own. Get help right away. Call your doctor now or seek immediate care if:  · Your blood pressure is much higher than normal (such as 180/110 or higher), but you don't have symptoms. · You think high blood pressure is causing symptoms, such as:  ¨ Severe headache. ¨ Blurry vision. Watch closely for changes in your health, and be sure to contact your doctor if:  · Your blood pressure measures 140/90 or higher at least 2 times. That means the top number is 140 or higher or the bottom number is 90 or higher, or both. · You think you may be having side effects from your blood pressure medicine. · Your blood pressure is usually normal, but it goes above normal at least 2 times. Where can you learn more? Go to http://adela-santhosh.info/. Enter K075 in the search box to learn more about \"High Blood Pressure: Care Instructions. \"  Current as of: August 8, 2016  Content Version: 11.2  © 7989-4801 [employer]. Care instructions adapted under license by [employer] (which disclaims liability or warranty for this information). If you have questions about a medical condition or this instruction, always ask your healthcare professional. [address] any warranty or liability for your use of this information. Learning About High Blood Sugar  What is high blood sugar? Your body turns the food you eat into glucose (sugar), which it uses for energy. But if your body isn't able to use the sugar right away, it can build up in your blood and lead to high blood sugar. When the amount of sugar in your blood stays too high for too much of the time, you may have diabetes. Diabetes is a disease that can cause serious health problems.   The good news is that lifestyle changes may help you get your blood sugar back to normal and avoid or delay diabetes. What causes high blood sugar? Sugar (glucose) can build up in your blood if you:  · Are overweight. · Have a family history of diabetes. · Take certain medicines, such as steroids. What are the symptoms? Having high blood sugar may not cause any symptoms at all. Or it may make you feel very thirsty or very hungry. You may also urinate more often than usual, have blurry vision, or lose weight without trying. How is high blood sugar treated? You can take steps to lower your blood sugar level if you understand what makes it get higher. Your doctor may want you to learn how to test your blood sugar level at home. Then you can see how illness, stress, or different kinds of food or medicine raise or lower your blood sugar level. Other tests may be needed to see if you have diabetes. How can you prevent high blood sugar? · Watch your weight. If you're overweight, losing just a small amount of weight may help. Reducing fat around your waist is most important. · Limit the amount of calories, sweets, and unhealthy fat you eat. Ask your doctor if a dietitian can help you. A registered dietitian can help you create meal plans that fit your lifestyle. · Get at least 30 minutes of exercise on most days of the week. Exercise helps control your blood sugar. It also helps you maintain a healthy weight. Walking is a good choice. You also may want to do other activities, such as running, swimming, cycling, or playing tennis or team sports. · If your doctor prescribed medicines, take them exactly as prescribed. Call your doctor if you think you are having a problem with your medicine. You will get more details on the specific medicines your doctor prescribes. Follow-up care is a key part of your treatment and safety. Be sure to make and go to all appointments, and call your doctor if you are having problems.  It's also a good idea to know your test results and keep a list of the medicines you take. Where can you learn more? Go to http://adela-santhosh.info/. Enter O108 in the search box to learn more about \"Learning About High Blood Sugar. \"  Current as of: May 23, 2016  Content Version: 11.2  © 5005-9581 [employer]. Care instructions adapted under license by [employer] (which disclaims liability or warranty for this information). If you have questions about a medical condition or this instruction, always ask your healthcare professional. [address] any warranty or liability for your use of this information. Patient armband removed and shredded.

## 2017-05-23 NOTE — DIABETES MGMT
Diabetes Patient/Family Education Record Plan of Care    Factors That  May Influence Patients Ability  to Learn or  Comply With  Recommendations:    []   Language barrier    []   Cultural needs   [x]   Motivation -      []   Cognitive limitation    [x]   Physical - pt unable to prepare meals due to weakness   []   Education    []   Physiological factors   []   Hearing/vision/speaking impairment   []   Tenriism beliefs    []   Financial factors   [x]  Other: pt is being discharged, has a ride who is waiting for her and states she can finish reading the provided diet information when she gets home. []  No factors identified at this time. Person Instructed:   [x]   Patient   []   Family   []  Other     Preference for Learning:   [x]   Verbal   [x]   Written   []  Demonstration     Level of Comprehension & Competence:    [x]  Good                                      [] Fair                                     []  Poor                             []  Needs Reinforcement   [x]  Teachback completed    Education Component:   [x]  Medication management, including how to administer insulin (if appropriate) and potential medication interactions - discussed importance of maintaining compliance with diabetes medications and other medications. [x]  Nutritional management including the role of carbohydrate intake - reviewed diet guidelines for HANKS - stressed importance of weight control, CHO, fat (especially saturated and trans fat) and Na control. Pt stating she once weighed 386# about 8 years ago and has been slowly losing weight with 17# lost over the past 3 months (relates that some of the very recent weight loss was unintentional). Pt also states she received diet education by KEVIN at [facility] for HANKS and is aware of guidelines. Pt stated she used to drink large amounts of regular soda but no longer does so. She now usually consumes water and fruit juice.  Discussed alternative beverages to fruit juice to avoid excess CHO intake. Pt states she can no longer cook and she has a 15year old dtr at home that prepares some of the meals. She has an adult dtr who does the grocery shopping for her and brings food over several days/week. Pt states this dtr used to bring over fast food and has decreased her fast food intake to about once weekly. She states she no longer eats fast food french fries and usually has only a small hamburger. She also has an adult son who plans to help with food shopping. Shopping list and sample menus provided. []  Exercise   []  Signs, symptoms, and treatment of hyperglycemia and hypoglycemia   [] Treatment of hyperglycemia and hypoglycemia   [x]  Importance of blood glucose monitoring and how to obtain a blood glucose meter - provided pt with new FreeStyle LITE meter 5/22/17.     [x]  Instruction on use of blood glucose meter   [x]  Discuss the importance of HbA1C monitoring and provide patient with  results   []  Sick day guidelines   []  Proper use and disposal of lancets, needles, syringes or insulin pens (if appropriate)   [x]  Potential long-term complications (retinopathy, kidney disease, neuropathy, heart disease, stroke, vascular disease, foot care)   [x] Provide emergency contact number and contact number for more information    [x]  Goal:  Patient/family will demonstrate understanding of Diabetes Self Management Skills by: (date) _______  Plan for post-discharge education or self-management support:    [x] Outpatient class schedule provided            [] Patient Declined    [] Scheduled for outpatient classes (date) _______       Jessie Edward, [address] [place] [address], [facility] [place]   Office:  [address] [place] [address] Pager:  [phone]

## 2017-05-23 NOTE — DISCHARGE SUMMARY
[facility]    Discharge Summary    Patient: Crystal Rabago MRN: [id number]  CSN: [id number]    YOB: 1960  Age: 49 y.o. Sex: female    DOA: 5/19/2017 LOS:  LOS: 4 days   Discharge Date: 5/23/2017     Admission Diagnoses: End stage liver disease ([facility]  Ascites  Hyperglycemia    Discharge Diagnoses:    Problem List as of 5/23/2017  Date Reviewed: 1/31/2017          Codes Class Noted - Resolved    Hyperglycemia ICD-10-CM: R73.9  ICD-9-CM: 790.29  5/19/2017 - Present        Ascites ICD-10-CM: R18.8  ICD-9-CM: 789.59  5/19/2017 - Present        End stage liver disease ([facility] ICD-10-CM: K72.90  ICD-9-CM: 572.8  5/19/2017 - Present        Diabetes mellitus type 2, controlled ([facility].) ICD-10-CM: E11.9  ICD-9-CM: 250.00  1/31/2017 - Present        HTN (hypertension) ICD-10-CM: I10  ICD-9-CM: 401.9  1/31/2017 - Present        Obesity ICD-10-CM: E66.9  ICD-9-CM: 278.00  1/31/2017 - Present        Candidal intertrigo ICD-10-CM: B37.2  ICD-9-CM: 112.3  7/28/2014 - Present        Acalculous cholecystitis ICD-10-CM: K81.9  ICD-9-CM: 575.10  2/27/2014 - Present        * (Principal)Liver cirrhosis secondary to HANKS (nonalcoholic steatohepatitis) ([facility] ICD-10-CM: K75.81, K74.60  ICD-9-CM: 571.8, 571.5  2/27/2014 - Present        Acquired absence of organ, genital organs ICD-10-CM: Z90.79  ICD-9-CM: V45.77  4/30/2012 - Present    Overview Signed 4/30/2012  2:32 PM by Erik Valdez MD     [address], [address], [address]  [phone] Fax# [phone] Fax# [phone] Fax# [phone]  SURGICAL PATHOLOGY REPORT  Patient Name: Libra Gibbs Specimen #: [id number]  Page 2 of [address] [place] [address] [facility]  3.7 cm in width.  On the anterior and posterior upper uterine corpus and fundus is a 3.2 x 2.7 cm tan-yellow thickened  nodular area. Within the thickened area is one smooth-lined cyst filled with clear watery fluid measuring 0.7 cm in  diameter. The yellow thickened area grossly appears to be stromal rather than endometrial, involves approximately  20% of the uterine wall, and within 1.6 cm of the overlying serosal surface. Invasion cannot be grossly determined or  ruled out. The endometrium in the area of the thickening appears grossly attenuated. The remaining endometrium is  pink-tan, soft, and measures 0.1 to 0.2 cm in thickness. The myometrium is pink-tan, firm, slightly trabecular, and  measures up to 2.4 cm in thickness. No leiomyomas are seen. Representative sections are submitted. Block summary:  B1 anterior cervix  B2 posterior cervix  B3 anterior lower uterine corpus  B4 posterior lower uterine corpus  B5 lower anterior uterine corpus and surrounding lesion  B6 upper anterior uterine corpus, including lesion with cyst  B7 upper posterior uterine corpus  B8 upper posterior uterine corpus, including thickest area of lesion  (mh)  VIDAL/mj1  MICROSCOPIC DESCRIPTION:  A. Sections show some small fragments of benign endometrial tissue in a background of abundant blood. A few  small fragments of benign stromal tissue are included, together with fragments of benign squamous mucosa. There  are no specific features or evidence of malignancy. B. Sections show an infarcted leiomyoma, which appears to be located predominantly within the submucosal tissues,  extending to the endometrial surface. Noted within the surrounding myometrial tissues are numerous blood vessels  containing eosinophilic material within the lumens, this material being associated with a focal multinucleated giant  cell response. These findings may be consistent with prior embolization. Where preserved, the remaining  endometrium shows a weakly proliferative pattern. Focal adenomyosis is also seen within the uterine wall.  The RESOLVED: HANKS (nonalcoholic steatohepatitis) ICD-10-CM: K75.81  ICD-9-CM: 571.8  9/25/2014 - 5/22/2017        RESOLVED: Bacterial vaginitis ICD-10-CM: N76.0, B96.89  ICD-9-CM: 616.10, 041.9  8/31/2011 - 5/22/2017        RESOLVED: Fibroid ICD-10-CM: D25.9  ICD-9-CM: 218.9  4/25/2011 - 4/30/2012        RESOLVED: ADENOMYOSIS ICD-9-CM: 617.0  4/25/2011 - 4/30/2012              Discharge Condition: Stable    Discharge To: Home    Consults: Gastroenterology and [facility] Course: Ms. Shahid Sharif is a 49year old female who has a longstanding history of nonalcoholic hepatitis in which she has a GI specialist. She also has a history of diabetes, hypertension, and multiple other comorbidities. The patient stated that she woke up this morning, noticed a foul smell, and immediately noticed some blood on the front of her nightgown. This blood was coming from her navel. She said she has had bleeding from the navel in the past; however, it was minimal. This is the worst it has ever been. She has also been having a lot of weakness that has been progressively worsening over the past month, muscle spasms, bruising, lightheadedness. She does not ambulate secondary to knee pain. The patient decided to come into the ER for medical evaluation. While in the ED, GI was consulted. She was seen. Findings included low magnesium and elevated blood glucose levels. The patient was edematous. After being seen by GI, she was placed on albumin with the recommendation for admission. The patient was admitted for end-stage liver disease. Patient's urine was sent for culture and grew out Staph aureus for which she was treated with IV Rocephin. Patient was noted to have ascites and was started on Lasix and Aldactone. Patient's ammonia level was also noted to be elevated for which she was treated with Lactulose. Patient will need to follow up with GI as outpatient for referral to Dr. Cronin for workup for liver transplant.  Patient is stable for discharge home. Significant Diagnostic Studies:   Recent Results (from the past 24 hour(s))   GLUCOSE, POC    Collection Time: 05/22/17 11:40 AM   Result Value Ref Range    Glucose (POC) 275 (H) 70 - 110 mg/dL   GLUCOSE, POC    Collection Time: 05/22/17  4:57 PM   Result Value Ref Range    Glucose (POC) 280 (H) 70 - 110 mg/dL   GLUCOSE, POC    Collection Time: 05/22/17  9:15 PM   Result Value Ref Range    Glucose (POC) 272 (H) 70 - 110 mg/dL   AMMONIA    Collection Time: 05/23/17  4:05 AM   Result Value Ref Range    Ammonia 116 (H) 11 - 32 UMOL/L   METABOLIC PANEL, BASIC    Collection Time: 05/23/17  4:05 AM   Result Value Ref Range    Sodium 138 136 - 145 mmol/L    Potassium 3.2 (L) 3.5 - 5.5 mmol/L    Chloride 100 100 - 108 mmol/L    CO2 31 21 - 32 mmol/L    Anion gap 7 3.0 - 18 mmol/L    Glucose 131 (H) 74 - 99 mg/dL    BUN 10 7.0 - 18 MG/DL    Creatinine 0.69 0.6 - 1.3 MG/DL    BUN/Creatinine ratio 14 12 - 20      GFR est AA >60 >60 ml/min/1.73m2    GFR est non-AA >60 >60 ml/min/1.73m2    Calcium 8.3 (L) 8.5 - 10.1 MG/DL   GLUCOSE, POC    Collection Time: 05/23/17  8:02 AM   Result Value Ref Range    Glucose (POC) 132 (H) 70 - 110 mg/dL         Discharge Medications:     Current Discharge Medication List      START taking these medications    Details   spironolactone (ALDACTONE) 50 mg tablet Take 1 Tab by mouth daily. Qty: 30 Tab, Refills: 0      potassium chloride SR (KLOR-CON 10) 10 mEq tablet Take 1 Tab by mouth daily. Qty: 30 Tab, Refills: 0         CONTINUE these medications which have CHANGED    Details   lactulose (CHRONULAC) 10 gram/15 mL solution Take 30 mL by mouth three (3) times daily. Qty: 1 Bottle, Refills: 2         CONTINUE these medications which have NOT CHANGED    Details   metFORMIN (GLUCOPHAGE) 500 mg tablet Take 500 mg by mouth two (2) times daily (with meals). SITagliptin (JANUVIA) 100 mg tablet Take 100 mg by mouth daily.       lisinopril (PRINIVIL, ZESTRIL) 20 mg tablet Take 20 mg by mouth daily. clotrimazole (LOTRIMIN) 1 % topical cream Apply a thin layer over affected area twice daily for up to 14 days. Qty: 15 g, Refills: 2    Associated Diagnoses: Candidal intertrigo      gabapentin (NEURONTIN) 300 mg capsule Take 300 mg by mouth daily. Associated Diagnoses: Abnormal uterine bleeding; Fibroid; Adenomyosis             Activity: Activity as tolerated    Diet: Diabetic Diet; 7531-9869 kcal, low sodium     Wound Care: None needed    Follow-up: PCP within 1 week  GI within 1 week- GI to arrange f/u with Dr. Justa Herrera     Discharge time: 40 minutes   Carlita Pearce NP  5/23/2017, 10:29 AM      I examined the patient , reviewed the history, labs, and radiology reports  independently. I have reviewed the NP documentation, agree with the documentation, medical decision making and treatment plan as outlined by my NP.     Rayo Araujo MD

## 2017-05-25 NOTE — PROGRESS NOTES
fax'd uai and  Dc summary  To [facility]  For personal  Care, notified pt. Spoke with Marci Nuno  At [facility].

## 2017-06-12 ENCOUNTER — HOSPITAL ENCOUNTER (OUTPATIENT)
Dept: LAB | Age: 57
Discharge: HOME | End: 2017-06-12

## 2017-06-12 ENCOUNTER — OFFICE VISIT (OUTPATIENT)
Dept: HEMATOLOGY | Age: 57
End: 2017-06-12

## 2017-06-12 VITALS
TEMPERATURE: 98.3 F | WEIGHT: 293 LBS | BODY MASS INDEX: 50.02 KG/M2 | DIASTOLIC BLOOD PRESSURE: 45 MMHG | HEART RATE: 110 BPM | OXYGEN SATURATION: 90 % | RESPIRATION RATE: 20 BRPM | SYSTOLIC BLOOD PRESSURE: 104 MMHG | HEIGHT: 64 IN

## 2017-06-12 DIAGNOSIS — K74.60 CIRRHOSIS OF LIVER WITHOUT ASCITES, UNSPECIFIED HEPATIC CIRRHOSIS TYPE (HCC): Primary | ICD-10-CM

## 2017-06-12 DIAGNOSIS — K74.60 CIRRHOSIS OF LIVER WITHOUT ASCITES, UNSPECIFIED HEPATIC CIRRHOSIS TYPE (HCC): ICD-10-CM

## 2017-06-12 DIAGNOSIS — Z90.79 ACQUIRED ABSENCE OF GENITAL ORGAN: ICD-10-CM

## 2017-06-12 PROCEDURE — 99001 SPECIMEN HANDLING PT-LAB: CPT | Performed by: INTERNAL MEDICINE

## 2017-06-12 NOTE — PROGRESS NOTES
[address] Greg Quigley MD, [address]       Roselee Ahumada, TRICIA Reyes, DONNA Chau, [facility]   TRICIA Martinez NP        at [address], [address] [address]     [phone]     FAX: [phone]    at [address], [id number] [place] [address], [address]     [phone]     FAX: [phone]       Patient Care Team:  Dontae Jorge MD as PCP - General (Internal Medicine)  Sg Posey MD (General Surgery)  Alvin Strange MD (Hematology and Oncology)  Bimal Richardson MD (Gastroenterology)      Problem List  Date Reviewed: 8/27/2017          Codes Class Noted    Thrombocytopenia ([facility] ICD-10-CM: D69.6  ICD-9-CM: 287.5  8/27/2017        Ascites ICD-10-CM: R18.8  ICD-9-CM: 789.59  5/19/2017        Diabetes mellitus type 2, controlled ([facility] ICD-10-CM: E11.9  ICD-9-CM: 250.00  1/31/2017        HTN (hypertension) ICD-10-CM: I10  ICD-9-CM: 401.9  1/31/2017        Obesity ICD-10-CM: E66.9  ICD-9-CM: 278.00  1/31/2017        Candidal intertrigo ICD-10-CM: B37.2  ICD-9-CM: 112.3  7/28/2014        Acalculous cholecystitis ICD-10-CM: K81.9  ICD-9-CM: 575.10  2/27/2014        Cirrhosis ([facility] ICD-10-CM: K74.60  ICD-9-CM: 571.5  2/27/2014        Acquired absence of genital organ ICD-10-CM: Z90.79  ICD-9-CM: V45.77  4/30/2012              Gulshan Martinez returns to the [address] for management cirrhosis which is thought to be secondary to HANKS. The active problem list, all pertinent past medical history, medications, radiologic findings and laboratory findings related to the liver disorder were reviewed with the patient. The patient is a 49 y.o. female who was found to have chronic liver disease in 2011. She has a BMI of 49 which is down from a BMI of 53 at her last appointment in 1/2017.     A liver biopsy demonstrated that she had cirrhosis and HANKS. The patient has not developed any major complications of cirrhosis to date. She has a small varices by EGD from 1/2017. She cannot undergo gastric sleeve because of cirrhosis. She has severe arthritis of the knees and cannot walk. She cannot undergo knee replacement because of Cirrhosis. The patient has had falls and confusion. It is not clear if this is due to hepatic encephalopathy. The patient notes fatigue, pain in the right side over the liver, and problems concentrating,     The patient has limitations in functional activities secondary to these symptoms. ALLERGIES  No Known Allergies    MEDICATIONS  Current Outpatient Prescriptions   Medication Sig    spironolactone (ALDACTONE) 50 mg tablet Take 1 Tab by mouth daily.  metFORMIN (GLUCOPHAGE) 500 mg tablet Take 500 mg by mouth two (2) times daily (with meals).  SITagliptin (JANUVIA) 100 mg tablet Take 100 mg by mouth daily.  lisinopril (PRINIVIL, ZESTRIL) 20 mg tablet Take 20 mg by mouth daily.  clotrimazole (LOTRIMIN) 1 % topical cream Apply a thin layer over affected area twice daily for up to 14 days.  gabapentin (NEURONTIN) 300 mg capsule Take 300 mg by mouth daily.  oxyCODONE (OXYIR) 5 mg capsule Take 1 Cap by mouth every four (4) hours as needed. Max Daily Amount: 30 mg.    ondansetron (ZOFRAN ODT) 4 mg disintegrating tablet Take 1 Tab by mouth every eight (8) hours as needed for Nausea.  lactulose (CHRONULAC) 10 gram/15 mL solution TAKE 30 ML'S BY MOUTH THREE TIMES A DAY    sertraline (ZOLOFT) 50 mg tablet TAKE 1 TABLET BY MOUTH EVERY DAY    furosemide (LASIX) 20 mg tablet Take 1 Tab by mouth daily. No current facility-administered medications for this visit. SYSTEM REVIEW NOT RELATED TO LIVER DISEASE OR REVIEWED ABOVE:  Constitution systems: Negative for fever, chills, weight gain, weight loss. Eyes: Negative for visual changes.   ENT: Negative for sore throat, painful swallowing. Respiratory: Negative for cough, hemoptysis, SOB. Cardiology: Negative for chest pain, palpitations. GI:  Negative for constipation or diarrhea. : Negative for urinary frequency, dysuria, hematuria, nocturia. Skin: Negative for rash. Hematology: Negative for easy bruising, blood clots. Musculo-skelatal: Negative for back pain, muscle pain, weakness. Neurologic: Negative for headaches, dizziness, vertigo, memory problems not related to HE. Psychology: Negative for anxiety, depression. FAMILY HISTORY:  There is no family history of liver disease. SOCIAL HISTORY:  The patient is . The patient has 6 children  The patient has never used tobacco products. The patient has never consumed significant amounts of alcohol. The patient currently receiving disability. PHYSICAL EXAMINATION:  Visit Vitals    /45 (BP 1 Location: Left arm, BP Patient Position: Sitting)    Pulse (!) 110    Temp 98.3 °F (36.8 °C) (Tympanic)    Resp 20    Ht 5' 4\" (1.626 m)    Wt 308 lb (139.7 kg)    LMP 04/25/2011    SpO2 90%    BMI 52.87 kg/m2     General: No acute distress. Massive obesity. Eyes: Sclera mildly icteric. ENT: No oral lesions. Thyroid normal.  Nodes: No adenopathy. Skin: Spider angiomata. No jaundice. No palmar erythema. Respiratory: Lungs clear to auscultation. Cardiovascular: Regular heart rate. No murmurs. No JVD. Abdomen: Obese. Soft non-tender. Liver size normal to percussion/palpation. Spleen not palpable. No obvious ascites. Extremities: Trace edema. No muscle wasting. No gross arthritic changes. Neurologic: Alert and oriented. Cranial nerves grossly intact. Mild tremor with outstretched hands.     LABORATORY STUDIES:  [facility] [place] of [address] [place] [address] & Units 6/12/2017   WBC 4.6 - 13.2 K/uL 4.2   ANC 1.8 - 8.0 K/UL 2.4   HGB 12.0 - 16.0 g/dL 11.5    - 420 K/uL 57 (LL)   INR 0.8 - 1.2   1.3 (H)   AST 15 - 37 U/L 65 (H)   ALT 13 - 56 U/L 31   Alk Phos 45 - 117 U/L 130 (H)   Bili, Total 0.2 - 1.0 MG/DL 6.0 (H)   Bili, Direct 0.00 - 0.40 mg/dL 1.96 (H)   Albumin 3.4 - 5.0 g/dL 2.6 (L)   BUN 7.0 - 18 MG/DL 5 (L)   Creat 0.6 - 1.3 MG/DL 0.49 (L)   Na 136 - 145 mmol/L 138   K 3.5 - 5.5 mmol/L 4.0   Cl 100 - 108 mmol/L 100   CO2 21 - 32 mmol/L 25   Glucose 74 - 99 mg/dL 194 (H)   Magnesium 1.6 - 2.6 mg/dL    Ammonia 11 - 32 UMOL/L      SEROLOGIES:  Serologies Latest Ref Rng & Units 6/12/2017 7/28/2014   Hep B Surface Ag Negative  Negative   Hep C Ab 0.0 - 0.9 s/co ratio  <0.1   Ferritin 15 - 150 ng/mL 327 (H)    Iron % Saturation 15 - 55 % >90 (HH)    Alpha-1 antitrypsin level 90 - 200 mg/dL 134      LIVER HISTOLOGY:  Not available or performed    ENDOSCOPIC PROCEDURES:  1/2017. EGD by Dr Angelito Barragan. Small esophageal varices. RADIOLOGY:  11/2016. Ultrasound of liver. Echogenic consistent with cirrhosis. No liver mass lesions. No dilated bile ducts. No ascites. OTHER TESTING:  Not available or performed    ASSESSMENT AND PLAN:  Cirrhosis secondary to HANSK. The patient has never developed any complications of cirrhosis to date. The CTP score is 8. Child class B, MELD 15. The patient has significant symptoms from cirrhosis despite having a MELD score of 15. The patient is not a candidate for liver transplantation because of severe obesity. We would be happy to refer her to the LT center at [facility] but we knoiw they will want her to loose weight to a BMI under 45. Hepatic encephalopathy  has recently developed. Will initiate treatment with lactulose. EGD is being performed on a regular basis by GI Dr. Angelito Barragan. The patient was directed to continue all current medications at the current dosages. There are no contraindications for the patient to take any medications that are necessary for treatment of other medical issues. Thrombocytopenia secondary to cirrhosis.   There is no evidence of overt bleeding. There is no indication for platelet transfusions or pharmacologic treatment to increase the platelet count. [address] screening has recently been performed and does not suggest [address] The next liver imaging study will be performed in May 2017. All of the above issues were discussed with the patient. All questions were answered. The patient expressed a clear understanding of the above. [facility] 87 in 3 months.     Nataly Paez MD  [facility] [place] [facility] [address], [address] [place] [address] Street [address]  [phone]

## 2017-06-12 NOTE — MR AVS SNAPSHOT
Visit Information Date & Time Provider Department Dept. Phone Encounter #  
 6/12/2017  2:15 PM Jamal Syed MD [employer] [facility] [phone] [id number] Follow-up Instructions Return in about 2 months (around 8/12/2017) for MLS. Upcoming Health Maintenance Date Due  
 LIPID PANEL Q1 1962 FOOT EXAM Q1 10/25/1972 MICROALBUMIN Q1 10/25/1972 EYE EXAM RETINAL OR DILATED Q1 10/25/1972 Pneumococcal 19-64 Highest Risk (1 of 3 - PCV13) 10/25/1981 DTaP/Tdap/Td series (1 - Tdap) 10/25/1983 BREAST CANCER SCRN MAMMOGRAM 10/25/2012 FOBT Q 1 YEAR AGE 50-75 10/25/2012 PAP AKA CERVICAL CYTOLOGY 4/30/2015 INFLUENZA AGE 9 TO ADULT 8/1/2017 HEMOGLOBIN A1C Q6M 11/19/2017 Allergies as of 6/12/2017  Review Complete On: 6/12/2017 By: Malissa Kelly No Known Allergies Current Immunizations  Never Reviewed No immunizations on file. Not reviewed this visit You Were Diagnosed With   
  
 Codes Comments Cirrhosis of liver without ascites, unspecified hepatic cirrhosis [facility]    -  Primary ICD-10-CM: K74.60 ICD-9-CM: 571.5 Vitals BP Pulse Temp Resp Height(growth percentile) Weight(growth percentile) 104/45 (BP 1 Location: Left arm, BP Patient Position: Sitting) (!) 110 98.3 °F (36.8 °C) (Tympanic) 20 5' 4\" (1.626 m) 308 lb (139.7 kg) LMP SpO2 BMI OB Status Smoking Status 04/25/2011 90% 52.87 kg/m2 Hysterectomy Never Smoker BMI and BSA Data Body Mass Index Body Surface Area  
 52.87 kg/m 2 2.51 m 2 Preferred Pharmacy Pharmacy Name Phone [facility] - [place], [facility].  [address] [phone] Your Updated Medication List  
  
   
This list is accurate as of: 6/12/17  2:39 PM.  Always use your most recent med list.  
  
  
  
  
 clotrimazole 1 % topical cream  
Commonly known as:  Lisbeth Mercedes  
 Apply a thin layer over affected area twice daily for up to 14 days. gabapentin 300 mg capsule Commonly known as:  NEURONTIN Take 300 mg by mouth daily. JANUVIA 100 mg tablet Generic drug:  SITagliptin Take 100 mg by mouth daily. lactulose 10 gram/15 mL solution Commonly known as:  Marisel Moll Take 30 mL by mouth three (3) times daily. lisinopril 20 mg tablet Commonly known as:  Therisa Semen Take 20 mg by mouth daily. metFORMIN 500 mg tablet Commonly known as:  GLUCOPHAGE Take 500 mg by mouth two (2) times daily (with meals). potassium chloride SR 10 mEq tablet Commonly known as:  KLOR-CON 10 Take 1 Tab by mouth daily. spironolactone 50 mg tablet Commonly known as:  ALDACTONE Take 1 Tab by mouth daily. We Performed the Following ALPHA-1-ANTITRYPSIN, TOTAL [51550 CPT(R)] FERRITIN [28351 CPT(R)] IRON PROFILE [id number] CPT(R)] Follow-up Instructions Return in about 2 months (around 8/12/2017) for MLS. To-Do List   
 06/12/2017 Lab:  CBC WITH AUTOMATED DIFF   
  
 06/12/2017 Lab:  HEPATIC FUNCTION PANEL   
  
 06/12/2017 Lab:  METABOLIC PANEL, BASIC   
  
 06/12/2017 Lab:  PROTHROMBIN TIME + INR Introducing [facility]! [employer] introduces IM-Sense patient portal. Now you can access parts of your medical record, email your doctor's office, and request medication refills online. 1. In your internet browser, go to [email or web address]. Pick a Student/[employer] 2. Click on the First Time User? Click Here link in the Sign In box. You will see the New Member Sign Up page. 3. Enter your IM-Sense Access Code exactly as it appears below. You will not need to use this code after youve completed the sign-up process. If you do not sign up before the expiration date, you must request a new code. · IM-Sense Access Code: [email or web address] Expires: 8/15/2017  8:08 AM 
 
 4. Enter the last four digits of your Social Security Number (xxxx) and Date of Birth (mm/dd/yyyy) as indicated and click Submit. You will be taken to the next sign-up page. 5. Create a GLADvertising.[email or web address] ID. This will be your GLADvertising.[email or web address] login ID and cannot be changed, so think of one that is secure and easy to remember. 6. Create a GLADvertising.com password. You can change your password at any time. 7. Enter your Password Reset Question and Answer. This can be used at a later time if you forget your password. 8. Enter your e-mail address. You will receive e-mail notification when new information is available in [address] 9. Click Sign Up. You can now view and download portions of your medical record. 10. Click the Download Summary menu link to download a portable copy of your medical information. If you have questions, please visit the Frequently Asked Questions section of the [email or web address] website. Remember, [email or web address] is NOT to be used for urgent needs. For medical emergencies, dial 911. Now available from your iPhone and Android! Please provide this summary of care documentation to your next provider. Your primary care clinician is listed as Kaushik Smith. If you have any questions after today's visit, please call [phone].

## 2017-06-13 LAB
A1AT SERPL-MCNC: 134 MG/DL (ref 90–200)
ALBUMIN SERPL-MCNC: 2.6 G/DL (ref 3.5–5.5)
ALP SERPL-CCNC: 130 IU/L (ref 39–117)
ALT SERPL-CCNC: 31 IU/L (ref 0–32)
AST SERPL-CCNC: 65 IU/L (ref 0–40)
BASOPHILS # BLD AUTO: 0 X10E3/UL (ref 0–0.2)
BASOPHILS NFR BLD AUTO: 1 %
BILIRUB DIRECT SERPL-MCNC: 1.96 MG/DL (ref 0–0.4)
BILIRUB SERPL-MCNC: 6 MG/DL (ref 0–1.2)
BUN SERPL-MCNC: 5 MG/DL (ref 6–24)
BUN/CREAT SERPL: 10 (ref 9–23)
CALCIUM SERPL-MCNC: 8.5 MG/DL (ref 8.7–10.2)
CHLORIDE SERPL-SCNC: 100 MMOL/L (ref 96–106)
CO2 SERPL-SCNC: 25 MMOL/L (ref 18–29)
CREAT SERPL-MCNC: 0.49 MG/DL (ref 0.57–1)
EOSINOPHIL # BLD AUTO: 0.2 X10E3/UL (ref 0–0.4)
EOSINOPHIL NFR BLD AUTO: 4 %
ERYTHROCYTE [DISTWIDTH] IN BLOOD BY AUTOMATED COUNT: 17.2 % (ref 12.3–15.4)
FERRITIN SERPL-MCNC: 327 NG/ML (ref 15–150)
GLUCOSE SERPL-MCNC: 194 MG/DL (ref 65–99)
HCT VFR BLD AUTO: 33.1 % (ref 34–46.6)
HGB BLD-MCNC: 11.5 G/DL (ref 11.1–15.9)
IMM GRANULOCYTES # BLD: 0 X10E3/UL (ref 0–0.1)
IMM GRANULOCYTES NFR BLD: 0 %
INR PPP: 1.3 (ref 0.8–1.2)
IRON SATN MFR SERPL: >90 % (ref 15–55)
IRON SERPL-MCNC: 154 UG/DL (ref 27–159)
LYMPHOCYTES # BLD AUTO: 1.1 X10E3/UL (ref 0.7–3.1)
LYMPHOCYTES NFR BLD AUTO: 25 %
MCH RBC QN AUTO: 37.6 PG (ref 26.6–33)
MCHC RBC AUTO-ENTMCNC: 34.7 G/DL (ref 31.5–35.7)
MCV RBC AUTO: 108 FL (ref 79–97)
MONOCYTES # BLD AUTO: 0.5 X10E3/UL (ref 0.1–0.9)
MONOCYTES NFR BLD AUTO: 12 %
MORPHOLOGY BLD-IMP: ABNORMAL
NEUTROPHILS # BLD AUTO: 2.4 X10E3/UL (ref 1.4–7)
NEUTROPHILS NFR BLD AUTO: 58 %
PLATELET # BLD AUTO: 57 X10E3/UL (ref 150–379)
POTASSIUM SERPL-SCNC: 4 MMOL/L (ref 3.5–5.2)
PROT SERPL-MCNC: 6.8 G/DL (ref 6–8.5)
PROTHROMBIN TIME: 13.7 SEC (ref 9.1–12)
RBC # BLD AUTO: 3.06 X10E6/UL (ref 3.77–5.28)
SODIUM SERPL-SCNC: 138 MMOL/L (ref 134–144)
TIBC SERPL-MCNC: <171 UG/DL (ref 250–450)
UIBC SERPL-MCNC: <17 UG/DL (ref 131–425)
WBC # BLD AUTO: 4.2 X10E3/UL (ref 3.4–10.8)

## 2017-08-09 ENCOUNTER — OFFICE VISIT (OUTPATIENT)
Dept: HEMATOLOGY | Age: 57
End: 2017-08-09

## 2017-08-09 VITALS
SYSTOLIC BLOOD PRESSURE: 140 MMHG | OXYGEN SATURATION: 98 % | HEART RATE: 92 BPM | TEMPERATURE: 97.3 F | DIASTOLIC BLOOD PRESSURE: 68 MMHG | RESPIRATION RATE: 16 BRPM | WEIGHT: 285 LBS | HEIGHT: 64 IN | BODY MASS INDEX: 48.65 KG/M2

## 2017-08-09 DIAGNOSIS — K76.0 NAFLD (NONALCOHOLIC FATTY LIVER DISEASE): Primary | ICD-10-CM

## 2017-08-09 RX ORDER — POTASSIUM CHLORIDE 750 MG/1
TABLET, EXTENDED RELEASE ORAL
COMMUNITY
Start: 2017-05-23 | End: 2017-08-09

## 2017-08-09 RX ORDER — FUROSEMIDE 20 MG/1
20 TABLET ORAL DAILY
Qty: 90 TAB | Refills: 3 | Status: SHIPPED | OUTPATIENT
Start: 2017-08-09 | End: 2017-11-08 | Stop reason: SDUPTHER

## 2017-08-09 RX ORDER — LACTULOSE 10 G/15ML
SOLUTION ORAL; RECTAL
Refills: 2 | COMMUNITY
Start: 2017-07-13 | End: 2017-09-09

## 2017-08-09 RX ORDER — CYCLOBENZAPRINE HCL 10 MG
TABLET ORAL
Refills: 3 | COMMUNITY
Start: 2017-07-13 | End: 2017-08-09

## 2017-08-09 RX ORDER — SERTRALINE HYDROCHLORIDE 50 MG/1
TABLET, FILM COATED ORAL
Refills: 1 | COMMUNITY
Start: 2017-07-13

## 2017-08-09 NOTE — MR AVS SNAPSHOT
Visit Information Date & Time Provider Department Dept. Phone Encounter #  
 8/9/2017 11:15 AM Nika Wadsworth MD [facility] [phone] [id number] Follow-up Instructions Return in about 3 months (around 11/9/2017) for Jasmin. Upcoming Health Maintenance Date Due  
 LIPID PANEL Q1 1960 FOOT EXAM Q1 10/25/1972 MICROALBUMIN Q1 10/25/1972 EYE EXAM RETINAL OR DILATED Q1 10/25/1972 Pneumococcal 19-64 Highest Risk (1 of 3 - PCV13) 10/25/1981 DTaP/Tdap/Td series (1 - Tdap) 10/25/1983 BREAST CANCER SCRN MAMMOGRAM 10/25/2012 FOBT Q 1 YEAR AGE 50-75 10/25/2012 PAP AKA CERVICAL CYTOLOGY 4/30/2015 INFLUENZA AGE 9 TO ADULT 8/1/2017 HEMOGLOBIN A1C Q6M 11/19/2017 Allergies as of 8/9/2017  Review Complete On: 6/12/2017 By: Isabelle Ruiz No Known Allergies Current Immunizations  Never Reviewed No immunizations on file. Not reviewed this visit Vitals BP Pulse Temp Resp Height(growth percentile) Weight(growth percentile) 140/68 (BP 1 Location: Left arm, BP Patient Position: Sitting) 92 97.3 °F (36.3 °C) (Oral) 16 5' 4\" (1.626 m) 285 lb (129.3 kg) LMP SpO2 BMI OB Status Smoking Status 04/25/2011 98% 48.92 kg/m2 Hysterectomy Never Smoker BMI and BSA Data Body Mass Index Body Surface Area 48.92 kg/m 2 2.42 m 2 Preferred Pharmacy Pharmacy Name Phone [phone] [phone] Your Updated Medication List  
  
   
This list is accurate as of: 8/9/17 12:23 PM.  Always use your most recent med list.  
  
  
  
  
 clotrimazole 1 % topical cream  
Commonly known as:  Pauline Lutheran Apply a thin layer over affected area twice daily for up to 14 days. furosemide 20 mg tablet Commonly known as:  LASIX Take 1 Tab by mouth daily. gabapentin 300 mg capsule Commonly known as:  NEURONTIN  
 Take 300 mg by mouth daily. JANUVIA 100 mg tablet Generic drug:  SITagliptin Take 100 mg by mouth daily. lactulose 10 gram/15 mL solution Commonly known as:  Rajatkari Hesteralds TAKE 30 ML'S BY MOUTH THREE TIMES A DAY  
  
 lisinopril 20 mg tablet Commonly known as:  Mollie Ripa Take 20 mg by mouth daily. metFORMIN 500 mg tablet Commonly known as:  GLUCOPHAGE Take 500 mg by mouth two (2) times daily (with meals). sertraline 50 mg tablet Commonly known as:  ZOLOFT  
TAKE 1 TABLET BY MOUTH EVERY DAY  
  
 spironolactone 50 mg tablet Commonly known as:  ALDACTONE Take 1 Tab by mouth daily. Prescriptions Sent to Pharmacy Refills  
 furosemide (LASIX) 20 mg tablet 3 Sig: Take 1 Tab by mouth daily. Class: Normal  
 Pharmacy: [address] [place] [address] 29  #: [phone] Route: Oral  
  
Follow-up Instructions Return in about 3 months (around 11/9/2017) for aJsmin. Introducing [facility] & HEALTH SERVICES! H[employer] Kumar introduces "[employer]" patient portal. Now you can access parts of your medical record, email your doctor's office, and request medication refills online. 1. In your internet browser, go to [email or web address]. "[employer] 2. Click on the First Time User? Click Here link in the Sign In box. You will see the New Member Sign Up page. 3. Enter your "[employer]" Access Code exactly as it appears below. You will not need to use this code after youve completed the sign-up process. If you do not sign up before the expiration date, you must request a new code. · "[employer]" Access Code: [email or web address] Expires: 8/15/2017  8:08 AM 
 
4. Enter the last four digits of your Social Security Number (xxxx) and Date of Birth (mm/dd/yyyy) as indicated and click Submit. You will be taken to the next sign-up page. 5. Create a "[employer]" ID.  This will be your "[employer]" login ID and cannot be changed, so think of one that is secure and easy to remember. 6. Create a LiveRail password. You can change your password at any time. 7. Enter your Password Reset Question and Answer. This can be used at a later time if you forget your password. 8. Enter your e-mail address. You will receive e-mail notification when new information is available in [address] 9. Click Sign Up. You can now view and download portions of your medical record. 10. Click the Download Summary menu link to download a portable copy of your medical information. If you have questions, please visit the Frequently Asked Questions section of the LiveRail website. Remember, LiveRail is NOT to be used for urgent needs. For medical emergencies, dial 911. Now available from your iPhone and Android! Please provide this summary of care documentation to your next provider. Your primary care clinician is listed as Abdirashid Rodriguez. If you have any questions after today's visit, please call [phone].

## 2017-08-20 ENCOUNTER — HOSPITAL ENCOUNTER (EMERGENCY)
Age: 57
Discharge: HOME | End: 2017-08-21
Attending: EMERGENCY MEDICINE
Payer: MEDICAID

## 2017-08-20 ENCOUNTER — APPOINTMENT (OUTPATIENT)
Dept: CT IMAGING | Age: 57
End: 2017-08-20
Attending: PHYSICIAN ASSISTANT
Payer: MEDICAID

## 2017-08-20 ENCOUNTER — APPOINTMENT (OUTPATIENT)
Dept: GENERAL RADIOLOGY | Age: 57
End: 2017-08-20
Attending: EMERGENCY MEDICINE
Payer: MEDICAID

## 2017-08-20 DIAGNOSIS — R10.11 RUQ ABDOMINAL PAIN: Primary | ICD-10-CM

## 2017-08-20 DIAGNOSIS — E83.42 HYPOMAGNESEMIA: ICD-10-CM

## 2017-08-20 DIAGNOSIS — R79.89 INCREASED AMMONIA LEVEL: ICD-10-CM

## 2017-08-20 DIAGNOSIS — K75.81 NASH (NONALCOHOLIC STEATOHEPATITIS): ICD-10-CM

## 2017-08-20 LAB
ABO + RH BLD: NORMAL
ALBUMIN SERPL-MCNC: 2.2 G/DL (ref 3.4–5)
ALBUMIN/GLOB SERPL: 0.5 {RATIO} (ref 0.8–1.7)
ALP SERPL-CCNC: 95 U/L (ref 45–117)
ALT SERPL-CCNC: 27 U/L (ref 13–56)
AMMONIA PLAS-SCNC: 90 UMOL/L (ref 11–32)
ANION GAP SERPL CALC-SCNC: 7 MMOL/L (ref 3–18)
AST SERPL-CCNC: 43 U/L (ref 15–37)
BASOPHILS # BLD: 0 K/UL (ref 0–0.06)
BASOPHILS NFR BLD: 1 % (ref 0–2)
BILIRUB SERPL-MCNC: 2.8 MG/DL (ref 0.2–1)
BLOOD GROUP ANTIBODIES SERPL: NORMAL
BUN SERPL-MCNC: 6 MG/DL (ref 7–18)
BUN/CREAT SERPL: 9 (ref 12–20)
CALCIUM SERPL-MCNC: 7.9 MG/DL (ref 8.5–10.1)
CHLORIDE SERPL-SCNC: 105 MMOL/L (ref 100–108)
CO2 SERPL-SCNC: 27 MMOL/L (ref 21–32)
CREAT SERPL-MCNC: 0.65 MG/DL (ref 0.6–1.3)
DIFFERENTIAL METHOD BLD: ABNORMAL
EOSINOPHIL # BLD: 0.1 K/UL (ref 0–0.4)
EOSINOPHIL NFR BLD: 3 % (ref 0–5)
ERYTHROCYTE [DISTWIDTH] IN BLOOD BY AUTOMATED COUNT: 14.3 % (ref 11.6–14.5)
GLOBULIN SER CALC-MCNC: 4.3 G/DL (ref 2–4)
GLUCOSE SERPL-MCNC: 204 MG/DL (ref 74–99)
HCT VFR BLD AUTO: 31 % (ref 35–45)
HGB BLD-MCNC: 10.9 G/DL (ref 12–16)
INR PPP: 1.5 (ref 0.8–1.2)
LIPASE SERPL-CCNC: 237 U/L (ref 73–393)
LYMPHOCYTES # BLD: 1 K/UL (ref 0.9–3.6)
LYMPHOCYTES NFR BLD: 26 % (ref 21–52)
MAGNESIUM SERPL-MCNC: 1.4 MG/DL (ref 1.6–2.6)
MCH RBC QN AUTO: 36.6 PG (ref 24–34)
MCHC RBC AUTO-ENTMCNC: 35.2 G/DL (ref 31–37)
MCV RBC AUTO: 104 FL (ref 74–97)
MONOCYTES # BLD: 0.5 K/UL (ref 0.05–1.2)
MONOCYTES NFR BLD: 12 % (ref 3–10)
NEUTS SEG # BLD: 2.2 K/UL (ref 1.8–8)
NEUTS SEG NFR BLD: 58 % (ref 40–73)
PLATELET # BLD AUTO: 53 K/UL (ref 135–420)
PMV BLD AUTO: 9.2 FL (ref 9.2–11.8)
POTASSIUM SERPL-SCNC: 3.7 MMOL/L (ref 3.5–5.5)
PROT SERPL-MCNC: 6.5 G/DL (ref 6.4–8.2)
PROTHROMBIN TIME: 17.2 SEC (ref 11.5–15.2)
RBC # BLD AUTO: 2.98 M/UL (ref 4.2–5.3)
SODIUM SERPL-SCNC: 139 MMOL/L (ref 136–145)
SPECIMEN EXP DATE BLD: NORMAL
WBC # BLD AUTO: 3.8 K/UL (ref 4.6–13.2)

## 2017-08-20 PROCEDURE — 96375 TX/PRO/DX INJ NEW DRUG ADDON: CPT

## 2017-08-20 PROCEDURE — 82140 ASSAY OF AMMONIA: CPT | Performed by: EMERGENCY MEDICINE

## 2017-08-20 PROCEDURE — 74011250636 HC RX REV CODE- 250/636: Performed by: EMERGENCY MEDICINE

## 2017-08-20 PROCEDURE — 96365 THER/PROPH/DIAG IV INF INIT: CPT

## 2017-08-20 PROCEDURE — 83735 ASSAY OF MAGNESIUM: CPT | Performed by: EMERGENCY MEDICINE

## 2017-08-20 PROCEDURE — 86900 BLOOD TYPING SEROLOGIC ABO: CPT | Performed by: EMERGENCY MEDICINE

## 2017-08-20 PROCEDURE — 96376 TX/PRO/DX INJ SAME DRUG ADON: CPT

## 2017-08-20 PROCEDURE — 96361 HYDRATE IV INFUSION ADD-ON: CPT

## 2017-08-20 PROCEDURE — 85610 PROTHROMBIN TIME: CPT | Performed by: EMERGENCY MEDICINE

## 2017-08-20 PROCEDURE — 74011636320 HC RX REV CODE- 636/320: Performed by: EMERGENCY MEDICINE

## 2017-08-20 PROCEDURE — 71010 XR CHEST PORT: CPT

## 2017-08-20 PROCEDURE — 83690 ASSAY OF LIPASE: CPT | Performed by: EMERGENCY MEDICINE

## 2017-08-20 PROCEDURE — 99284 EMERGENCY DEPT VISIT MOD MDM: CPT

## 2017-08-20 PROCEDURE — 80053 COMPREHEN METABOLIC PANEL: CPT | Performed by: EMERGENCY MEDICINE

## 2017-08-20 PROCEDURE — 74177 CT ABD & PELVIS W/CONTRAST: CPT

## 2017-08-20 PROCEDURE — 74011250636 HC RX REV CODE- 250/636: Performed by: PHYSICIAN ASSISTANT

## 2017-08-20 PROCEDURE — 85025 COMPLETE CBC W/AUTO DIFF WBC: CPT | Performed by: EMERGENCY MEDICINE

## 2017-08-20 PROCEDURE — 93005 ELECTROCARDIOGRAM TRACING: CPT

## 2017-08-20 RX ORDER — HYDROMORPHONE HYDROCHLORIDE 1 MG/ML
1 INJECTION, SOLUTION INTRAMUSCULAR; INTRAVENOUS; SUBCUTANEOUS
Status: COMPLETED | OUTPATIENT
Start: 2017-08-20 | End: 2017-08-20

## 2017-08-20 RX ORDER — HYDROMORPHONE HYDROCHLORIDE 1 MG/ML
1 INJECTION, SOLUTION INTRAMUSCULAR; INTRAVENOUS; SUBCUTANEOUS ONCE
Status: COMPLETED | OUTPATIENT
Start: 2017-08-20 | End: 2017-08-20

## 2017-08-20 RX ADMIN — SODIUM CHLORIDE 500 ML: 900 INJECTION, SOLUTION INTRAVENOUS at 20:49

## 2017-08-20 RX ADMIN — HYDROMORPHONE HYDROCHLORIDE 1 MG: 1 INJECTION, SOLUTION INTRAMUSCULAR; INTRAVENOUS; SUBCUTANEOUS at 23:15

## 2017-08-20 RX ADMIN — IOPAMIDOL 100 ML: 612 INJECTION, SOLUTION INTRAVENOUS at 23:45

## 2017-08-20 RX ADMIN — SODIUM CHLORIDE 500 ML: 900 INJECTION, SOLUTION INTRAVENOUS at 23:17

## 2017-08-20 RX ADMIN — HYDROMORPHONE HYDROCHLORIDE 1 MG: 1 INJECTION, SOLUTION INTRAMUSCULAR; INTRAVENOUS; SUBCUTANEOUS at 20:48

## 2017-08-21 VITALS
TEMPERATURE: 98.2 F | RESPIRATION RATE: 20 BRPM | WEIGHT: 285 LBS | DIASTOLIC BLOOD PRESSURE: 66 MMHG | OXYGEN SATURATION: 94 % | HEART RATE: 77 BPM | SYSTOLIC BLOOD PRESSURE: 109 MMHG | BODY MASS INDEX: 48.92 KG/M2

## 2017-08-21 LAB
ATRIAL RATE: 99 BPM
CALCULATED P AXIS, ECG09: 58 DEGREES
CALCULATED R AXIS, ECG10: -3 DEGREES
CALCULATED T AXIS, ECG11: 43 DEGREES
DIAGNOSIS, 93000: NORMAL
P-R INTERVAL, ECG05: 160 MS
Q-T INTERVAL, ECG07: 388 MS
QRS DURATION, ECG06: 100 MS
QTC CALCULATION (BEZET), ECG08: 497 MS
VENTRICULAR RATE, ECG03: 99 BPM

## 2017-08-21 PROCEDURE — 74011250636 HC RX REV CODE- 250/636

## 2017-08-21 PROCEDURE — 74011250636 HC RX REV CODE- 250/636: Performed by: PHYSICIAN ASSISTANT

## 2017-08-21 PROCEDURE — 74011250636 HC RX REV CODE- 250/636: Performed by: EMERGENCY MEDICINE

## 2017-08-21 RX ORDER — ONDANSETRON 2 MG/ML
INJECTION INTRAMUSCULAR; INTRAVENOUS
Status: COMPLETED
Start: 2017-08-21 | End: 2017-08-21

## 2017-08-21 RX ORDER — HYDROMORPHONE HYDROCHLORIDE 1 MG/ML
1 INJECTION, SOLUTION INTRAMUSCULAR; INTRAVENOUS; SUBCUTANEOUS
Status: COMPLETED | OUTPATIENT
Start: 2017-08-21 | End: 2017-08-21

## 2017-08-21 RX ORDER — ONDANSETRON 2 MG/ML
4 INJECTION INTRAMUSCULAR; INTRAVENOUS
Status: COMPLETED | OUTPATIENT
Start: 2017-08-21 | End: 2017-08-21

## 2017-08-21 RX ORDER — OXYCODONE HYDROCHLORIDE 5 MG/1
5 CAPSULE ORAL
Qty: 15 CAP | Refills: 0 | Status: SHIPPED | OUTPATIENT
Start: 2017-08-21 | End: 2017-09-09

## 2017-08-21 RX ORDER — MORPHINE SULFATE 4 MG/ML
4 INJECTION, SOLUTION INTRAMUSCULAR; INTRAVENOUS
Status: COMPLETED | OUTPATIENT
Start: 2017-08-21 | End: 2017-08-21

## 2017-08-21 RX ORDER — ONDANSETRON 4 MG/1
4 TABLET, ORALLY DISINTEGRATING ORAL
Qty: 15 TAB | Refills: 0 | Status: SHIPPED | OUTPATIENT
Start: 2017-08-21 | End: 2017-09-09

## 2017-08-21 RX ORDER — MAGNESIUM SULFATE HEPTAHYDRATE 40 MG/ML
2 INJECTION, SOLUTION INTRAVENOUS ONCE
Status: COMPLETED | OUTPATIENT
Start: 2017-08-21 | End: 2017-08-21

## 2017-08-21 RX ADMIN — ONDANSETRON 4 MG: 2 INJECTION INTRAMUSCULAR; INTRAVENOUS at 01:40

## 2017-08-21 RX ADMIN — MAGNESIUM SULFATE HEPTAHYDRATE 2 G: 40 INJECTION, SOLUTION INTRAVENOUS at 01:39

## 2017-08-21 RX ADMIN — HYDROMORPHONE HYDROCHLORIDE 1 MG: 1 INJECTION, SOLUTION INTRAMUSCULAR; INTRAVENOUS; SUBCUTANEOUS at 05:43

## 2017-08-21 RX ADMIN — Medication 4 MG: at 02:15

## 2017-08-21 NOTE — ED TRIAGE NOTES
Provider in triage: hx nonalcoholic cirrhosis. 2d abd pain, chest pain, central and radiates to sides.   No v/d  Ordered: labs cxr ekg trop pain meds  DDx: liver pain, less likely acs, don't suspect obstruc or perf  Sent to (MAIN treatment area, FAST track): main

## 2017-08-21 NOTE — ED TRIAGE NOTES
Pt c/o pain that starts in the epigastric area and radiates to both sides and into her abdomen. Pt with hx of liver failure.    This started two days ago  States she feels weak and dizzy

## 2017-08-21 NOTE — ED NOTES
I have reviewed discharge instructions with the patient. The patient verbalized understanding. Patient armband removed and shredded. Patient discharged via wheelchair to lobby with daughter.

## 2017-08-21 NOTE — DISCHARGE INSTRUCTIONS
Abdominal Pain: Care Instructions  Your Care Instructions    Abdominal pain has many possible causes. Some aren't serious and get better on their own in a few days. Others need more testing and treatment. If your pain continues or gets worse, you need to be rechecked and may need more tests to find out what is wrong. You may need surgery to correct the problem. Don't ignore new symptoms, such as fever, nausea and vomiting, urination problems, pain that gets worse, and dizziness. These may be signs of a more serious problem. Your doctor may have recommended a follow-up visit in the next 8 to 12 hours. If you are not getting better, you may need more tests or treatment. The doctor has checked you carefully, but problems can develop later. If you notice any problems or new symptoms, get medical treatment right away. Follow-up care is a key part of your treatment and safety. Be sure to make and go to all appointments, and call your doctor if you are having problems. It's also a good idea to know your test results and keep a list of the medicines you take. How can you care for yourself at home? · Rest until you feel better. · To prevent dehydration, drink plenty of fluids, enough so that your urine is light yellow or clear like water. Choose water and other caffeine-free clear liquids until you feel better. If you have kidney, heart, or liver disease and have to limit fluids, talk with your doctor before you increase the amount of fluids you drink. · If your stomach is upset, eat mild foods, such as rice, dry toast or crackers, bananas, and applesauce. Try eating several small meals instead of two or three large ones. · Wait until 48 hours after all symptoms have gone away before you have spicy foods, alcohol, and drinks that contain caffeine. · Do not eat foods that are high in fat. · Avoid anti-inflammatory medicines such as aspirin, ibuprofen (Advil, Motrin), and naproxen (Aleve).  These can cause stomach upset. Talk to your doctor if you take daily aspirin for another health problem. When should you call for help? Call 911 anytime you think you may need emergency care. For example, call if:  · You passed out (lost consciousness). · You pass maroon or very bloody stools. · You vomit blood or what looks like coffee grounds. · You have new, severe belly pain. Call your doctor now or seek immediate medical care if:  · Your pain gets worse, especially if it becomes focused in one area of your belly. · You have a new or higher fever. · Your stools are black and look like tar, or they have streaks of blood. · You have unexpected vaginal bleeding. · You have symptoms of a urinary tract infection. These may include:  ¨ Pain when you urinate. ¨ Urinating more often than usual.  ¨ Blood in your urine. · You are dizzy or lightheaded, or you feel like you may faint. Watch closely for changes in your health, and be sure to contact your doctor if:  · You are not getting better after 1 day (24 hours). Where can you learn more? Go to http://adelaPhotomedexsanthosh.info/. Enter N693 in the search box to learn more about \"Abdominal Pain: Care Instructions. \"  Current as of: March 20, 2017  Content Version: 11.3  © 8725-6098 [employer]. Care instructions adapted under license by [employer] (which disclaims liability or warranty for this information). If you have questions about a medical condition or this instruction, always ask your healthcare professional. [place] [address] any warranty or liability for your use of this information. Nonalcoholic Steatohepatitis (HANKS): Care Instructions  Your Care Instructions    Nonalcoholic steatohepatitis (HANKS) is liver inflammation. It is caused by a buildup of fat in the liver. The fat buildup is not caused by drinking alcohol. Because of the inflammation, the liver does not work as well as it should.   HANKS is part of a group of liver diseases called nonalcoholic fatty liver disease. In these diseases, fat builds up in the liver and sometimes causes liver damage. This damage can get worse over time. Follow-up care is a key part of your treatment and safety. Be sure to make and go to all appointments, and call your doctor if you are having problems. It's also a good idea to know your test results and keep a list of the medicines you take. How can you care for yourself at home? · Stay at a healthy weight. Or if you need to, slowly get to a healthy weight. · Control your cholesterol. Talk to your doctor about ways to lower your cholesterol, if needed. You might try getting active, taking medicines, and making healthy changes to your diet. · Eat healthy foods. This includes fruits, vegetables, lean meats and dairy, and whole grains. · If you have diabetes, keep your blood sugar at your target level. · Get at least 30 minutes of exercise on most days of the week. Walking is a good choice. You also may want to do other activities, such as running, swimming, cycling, or playing tennis or team sports. · Limit alcohol, or do not drink. Alcohol can damage the liver and cause health problems. When should you call for help? Call your doctor now or seek immediate medical care if:  · You have yellowing of the skin or the whites of the eyes. (This is called jaundice.)  · You have pain in the upper right part of your belly. Watch closely for changes in your health, and be sure to contact your doctor if:  · You have swelling in your legs or belly. · Your skin itches. Where can you learn more? Go to http://adela-santhosh.info/. Enter Z710 in the search box to learn more about \"Nonalcoholic Steatohepatitis (HANKS): Care Instructions. \"  Current as of: August 9, 2016  Content Version: 11.3  © 9947-4914 [employer].  Care instructions adapted under license by [employer] (which disclaims liability or warranty for this information). If you have questions about a medical condition or this instruction, always ask your healthcare professional. [place] [address] any warranty or liability for your use of this information.

## 2017-08-21 NOTE — ED PROVIDER NOTES
HPI Comments: James Bañuelos is a 49 y.o. female with a hx of HANKS, thrombocytopenia, DM, HTN, and abdominal/pelvic/esophegeal varices that presents to the ED with a complaint of epigastric pain, weakness and dizziness. Pt states that she has been weak and dizzy for a few days and almost passed out earlier today. She states pain is epigastric and upper right quadrant but radiates to both sides of her abdomen and she rates her pain as 7/10. Pt denies NVD, blood in stool, denies SOB, Fever, HA, CP    Patient is a 49 y.o. female presenting with abdominal pain. Abdominal Pain    Pertinent negatives include no fever, no dysuria, no chest pain and no back pain. Past Medical History:   Diagnosis Date    Bilateral knee pain     Cirrhosis of liver (HCC)     Depression     Diabetes ([facility] [address]     Diastolic hypertension     Fatty liver     Gastritis     Hypertension     Ill-defined condition     Inflammation of lymphatics     Liver disease     cirrhosis    Menorrhagia     HANKS (nonalcoholic steatohepatitis)     Nausea     Osteoarthritis     Right flank pain     RUQ abdominal pain     Sleep apnea     Does not use consistently    Thrombocytopenia (HCC)        Past Surgical History:   Procedure Laterality Date    ENDOSCOPY, COLON, DIAGNOSTIC  5/20/2013    HX HYSTERECTOMY      HX OTHER SURGICAL      liver biopsy    HX OVARIAN CYST REMOVAL      Bilateral         History reviewed. No pertinent family history. Social History     Social History    Marital status:      Spouse name: N/A    Number of children: N/A    Years of education: N/A     Occupational History    Not on file.      Social History Main Topics    Smoking status: Never Smoker    Smokeless tobacco: Never Used    Alcohol use No    Drug use: No    Sexual activity: Yes     Partners: Male     Birth control/ protection: None     Other Topics Concern    Blood Transfusions Yes     prior to hysterectomy    Occupational Exposure No    Hobby Hazards No    Stress Concern No    Weight Concern Yes    Exercise No    Seat Belt Yes    Self-Exams No     Social History Narrative         ALLERGIES: Review of patient's allergies indicates no known allergies. Review of Systems   Constitutional: Negative for fatigue and fever. Respiratory: Negative for shortness of breath. Cardiovascular: Negative for chest pain. Gastrointestinal: Positive for abdominal pain. Genitourinary: Negative for difficulty urinating and dysuria. Musculoskeletal: Negative for back pain. All other systems reviewed and are negative. Vitals:    08/20/17 2230 08/20/17 2245 08/20/17 2300 08/20/17 2315   BP: 100/51 110/56 114/50 105/53   Pulse:       Resp:       Temp:       SpO2: 98% 99% 99% 99%   Weight:                Physical Exam   Constitutional: She is oriented to person, place, and time. She appears well-developed and well-nourished. No distress. PT appears obese   HENT:   Head: Normocephalic. Nose: Nose normal.   Eyes: Conjunctivae are normal. Pupils are equal, round, and reactive to light. Cardiovascular: Regular rhythm and normal heart sounds. Tachycardia present. Exam reveals no gallop. No murmur heard. Pulmonary/Chest: Effort normal and breath sounds normal. No respiratory distress. She has no wheezes. Abdominal: Normal appearance. There is tenderness in the right upper quadrant and epigastric area. There is no rigidity, no rebound, no guarding, no CVA tenderness, no tenderness at McBurney's point and negative Bruno's sign. Genitourinary: Rectal exam shows guaiac negative stool. Musculoskeletal:        Right lower leg: She exhibits edema. Left lower leg: She exhibits edema. 2+ lower leg edema   Neurological: She is alert and oriented to person, place, and time. Skin: Skin is warm and dry. Psychiatric: She has a normal mood and affect. Her behavior is normal.   Vitals reviewed.        MDM  Number of Diagnoses or Management Options  Hypomagnesemia:   Increased ammonia level:   HANKS (nonalcoholic steatohepatitis):   RUQ abdominal pain:   Diagnosis management comments: Labs Reviewed  CBC WITH AUTOMATED DIFF - Abnormal; Notable for the following:      WBC                           3.8 (*)                RBC                           2.98 (*)               HGB                           10.9 (*)               HCT                           31.0 (*)               MCV                           104.0 (*)               MCH                           36.6 (*)               PLATELET                      53 (*)                 MONOCYTES                     12 (*)              All other components within normal limits  METABOLIC PANEL, COMPREHENSIVE - Abnormal; Notable for the following:      Glucose                       204 (*)                BUN                           6 (*)                  BUN/Creatinine ratio          9 (*)                  Calcium                       7.9 (*)                Bilirubin, total              2.8 (*)                AST (SGOT)                    43 (*)                 Albumin                       2.2 (*)                Globulin                      4.3 (*)                A-G Ratio                     0.5 (*)             All other components within normal limits  AMMONIA - Abnormal; Notable for the following:      Ammonia                       90 (*)              All other components within normal limits  MAGNESIUM - Abnormal; Notable for the following:      Magnesium                     1.4 (*)             All other components within normal limits  PROTHROMBIN TIME + INR - Abnormal; Notable for the following:      Prothrombin time              17.2 (*)               INR                           1.5 (*)             All other components within normal limits  LIPASE  TYPE & SCREEN    EKG Results     Procedure 720 Value Units Date/Time    EKG, 12 LEAD, INITIAL ([id number]) Collected:  08/20/17 2056    Order Status: Completed Updated:  08/20/17 2058     Ventricular Rate 99 BPM      Atrial Rate 99 BPM      P-R Interval 160 ms      QRS Duration 100 ms      Q-T Interval 388 ms      QTC Calculation (Bezet) 497 ms      Calculated P Axis 58 degrees      Calculated R Axis -3 degrees      Calculated T Axis 43 degrees      Diagnosis --     Normal sinus rhythm  Prolonged QT  Abnormal ECG  When compared with ECG of 19-MAY-2017 14:10,  No significant change was found      EKG, 12 LEAD, INITIAL ([id number])     Order Status:  Sent         Consult:    3:25 AM   Discussed care with Dr. Cash Gonsalves, GI  Standard discussion; including history of patient's chief complaint, available diagnostic results, and treatment course. PLAN: Recommends CT abdomen/Pelvis states before she will offer treatment advice. CT abdomen/pelvis: NO acute finding  Splenomegaly  Cirrhotic lever- extensive abd/Pelvic/esophageal varicies  Anasarca with decreased ascites  Cholelithiasis      3:48 AM discussed pt with Dr Wenceslao Hernandes who will assume her care at this time.       Impression: hypomagnesemia, RUQ pain, Elevated ammonia levels, HANKS    Plan:         ED Course       Procedures           Vitals:  Patient Vitals for the past 12 hrs:   Temp Pulse Resp BP SpO2   08/20/17 2315 - - - 105/53 99 %   08/20/17 2300 - - - 114/50 99 %   08/20/17 2245 - - - 110/56 99 %   08/20/17 2230 - - - 100/51 98 %   08/20/17 2200 - - - 104/50 97 %   08/20/17 2145 - - - 106/54 97 %   08/20/17 2130 - - - 110/49 97 %   08/20/17 2045 - - - 118/67 100 %   08/20/17 2030 - - - 125/56 99 %   08/20/17 2025 98.2 °F (36.8 °C) (!) 101 20 128/75 100 %         Medications ordered:   Medications   magnesium sulfate 2 g/50 ml IVPB (premix or compounded) (not administered)   sodium chloride 0.9 % bolus infusion 500 mL (0 mL IntraVENous IV Completed 8/20/17 2149)   HYDROmorphone (PF) (DILAUDID) injection 1 mg (1 mg IntraVENous Given 8/20/17 2048)   HYDROmorphone (PF) (DILAUDID) injection 1 mg (1 mg IntraVENous Given 8/20/17 7284)   sodium chloride 0.9 % bolus infusion 500 mL (500 mL IntraVENous New Bag 8/20/17 8147)   iopamidol (ISOVUE 300) 61 % contrast injection 100 mL (100 mL IntraVENous Given 8/20/17 0659)         Lab findings:  Recent Results (from the past 12 hour(s))   CBC WITH AUTOMATED DIFF    Collection Time: 08/20/17  8:45 PM   Result Value Ref Range    WBC 3.8 (L) 4.6 - 13.2 K/uL    RBC 2.98 (L) 4.20 - 5.30 M/uL    HGB 10.9 (L) 12.0 - 16.0 g/dL    HCT 31.0 (L) 35.0 - 45.0 %    .0 (H) 74.0 - 97.0 FL    MCH 36.6 (H) 24.0 - 34.0 PG    MCHC 35.2 31.0 - 37.0 g/dL    RDW 14.3 11.6 - 14.5 %    PLATELET 53 (L) 887 - 420 K/uL    MPV 9.2 9.2 - 11.8 FL    NEUTROPHILS 58 40 - 73 %    LYMPHOCYTES 26 21 - 52 %    MONOCYTES 12 (H) 3 - 10 %    EOSINOPHILS 3 0 - 5 %    BASOPHILS 1 0 - 2 %    ABS. NEUTROPHILS 2.2 1.8 - 8.0 K/UL    ABS. LYMPHOCYTES 1.0 0.9 - 3.6 K/UL    ABS. MONOCYTES 0.5 0.05 - 1.2 K/UL    ABS. EOSINOPHILS 0.1 0.0 - 0.4 K/UL    ABS. BASOPHILS 0.0 0.0 - 0.06 K/UL    DF AUTOMATED     METABOLIC PANEL, COMPREHENSIVE    Collection Time: 08/20/17  8:45 PM   Result Value Ref Range    Sodium 139 136 - 145 mmol/L    Potassium 3.7 3.5 - 5.5 mmol/L    Chloride 105 100 - 108 mmol/L    CO2 27 21 - 32 mmol/L    Anion gap 7 3.0 - 18 mmol/L    Glucose 204 (H) 74 - 99 mg/dL    BUN 6 (L) 7.0 - 18 MG/DL    Creatinine 0.65 0.6 - 1.3 MG/DL    BUN/Creatinine ratio 9 (L) 12 - 20      GFR est AA >60 >60 ml/min/1.73m2    GFR est non-AA >60 >60 ml/min/1.73m2    Calcium 7.9 (L) 8.5 - 10.1 MG/DL    Bilirubin, total 2.8 (H) 0.2 - 1.0 MG/DL    ALT (SGPT) 27 13 - 56 U/L    AST (SGOT) 43 (H) 15 - 37 U/L    Alk.  phosphatase 95 45 - 117 U/L    Protein, total 6.5 6.4 - 8.2 g/dL    Albumin 2.2 (L) 3.4 - 5.0 g/dL    Globulin 4.3 (H) 2.0 - 4.0 g/dL    A-G Ratio 0.5 (L) 0.8 - 1.7     AMMONIA    Collection Time: 08/20/17  8:45 PM   Result Value Ref Range    Ammonia 90 (H) 11 - 32 UMOL/L   MAGNESIUM    Collection Time: 08/20/17  8:45 PM Result Value Ref Range    Magnesium 1.4 (L) 1.6 - 2.6 mg/dL   LIPASE    Collection Time: 08/20/17  8:45 PM   Result Value Ref Range    Lipase 237 73 - 393 U/L   PROTHROMBIN TIME + INR    Collection Time: 08/20/17  8:45 PM   Result Value Ref Range    Prothrombin time 17.2 (H) 11.5 - 15.2 sec    INR 1.5 (H) 0.8 - 1.2     EKG, 12 LEAD, INITIAL    Collection Time: 08/20/17  8:56 PM   Result Value Ref Range    Ventricular Rate 99 BPM    Atrial Rate 99 BPM    P-R Interval 160 ms    QRS Duration 100 ms    Q-T Interval 388 ms    QTC Calculation (Bezet) 497 ms    Calculated P Axis 58 degrees    Calculated R Axis -3 degrees    Calculated T Axis 43 degrees    Diagnosis       Normal sinus rhythm  Prolonged QT  Abnormal ECG  When compared with ECG of 19-MAY-2017 14:10,  No significant change was found     TYPE & SCREEN    Collection Time: 08/20/17  9:30 PM   Result Value Ref Range    Crossmatch Expiration 08/23/2017     ABO/Rh(D) A POSITIVE     Antibody screen NEG            X-Ray, CT or other radiology findings or impressions:  XR CHEST PORT   Final Result      CT ABD PELV W CONT    (Results Pending)       Progress notes, Consult notes or additional Procedure notes:       Disposition:  Diagnosis: No diagnosis found. Disposition:     Follow-up Information     None           Patient's Medications   Start Taking    No medications on file   Continue Taking    CLOTRIMAZOLE (LOTRIMIN) 1 % TOPICAL CREAM    Apply a thin layer over affected area twice daily for up to 14 days. FUROSEMIDE (LASIX) 20 MG TABLET    Take 1 Tab by mouth daily. GABAPENTIN (NEURONTIN) 300 MG CAPSULE    Take 300 mg by mouth daily. LACTULOSE (CHRONULAC) 10 GRAM/15 ML SOLUTION    TAKE 30 ML'S BY MOUTH THREE TIMES A DAY    LISINOPRIL (PRINIVIL, ZESTRIL) 20 MG TABLET    Take 20 mg by mouth daily. METFORMIN (GLUCOPHAGE) 500 MG TABLET    Take 500 mg by mouth two (2) times daily (with meals).     SERTRALINE (ZOLOFT) 50 MG TABLET    TAKE 1 TABLET BY MOUTH EVERY DAY    SITAGLIPTIN (JANUVIA) 100 MG TABLET    Take 100 mg by mouth daily. SPIRONOLACTONE (ALDACTONE) 50 MG TABLET    Take 1 Tab by mouth daily.    These Medications have changed    No medications on file   Stop Taking    No medications on file

## 2017-08-27 PROBLEM — K72.10 END STAGE LIVER DISEASE (HCC): Status: RESOLVED | Noted: 2017-05-19 | Resolved: 2017-08-27

## 2017-08-27 PROBLEM — R73.9 HYPERGLYCEMIA: Status: RESOLVED | Noted: 2017-05-19 | Resolved: 2017-08-27

## 2017-08-27 PROBLEM — D69.6 THROMBOCYTOPENIA (HCC): Status: ACTIVE | Noted: 2017-08-27

## 2017-09-09 ENCOUNTER — HOSPITAL ENCOUNTER (EMERGENCY)
Age: 57
Discharge: HOME | End: 2017-09-09
Attending: EMERGENCY MEDICINE
Payer: MEDICAID

## 2017-09-09 VITALS
BODY MASS INDEX: 46.78 KG/M2 | HEIGHT: 64 IN | HEART RATE: 95 BPM | RESPIRATION RATE: 18 BRPM | TEMPERATURE: 98.3 F | SYSTOLIC BLOOD PRESSURE: 110 MMHG | WEIGHT: 274 LBS | DIASTOLIC BLOOD PRESSURE: 45 MMHG | OXYGEN SATURATION: 94 %

## 2017-09-09 DIAGNOSIS — R79.89 INCREASED AMMONIA LEVEL: ICD-10-CM

## 2017-09-09 DIAGNOSIS — R17 ELEVATED BILIRUBIN: ICD-10-CM

## 2017-09-09 DIAGNOSIS — D69.6 THROMBOCYTOPENIA (HCC): ICD-10-CM

## 2017-09-09 DIAGNOSIS — R42 DIZZINESS: ICD-10-CM

## 2017-09-09 DIAGNOSIS — R74.8 ELEVATED LIVER ENZYMES: ICD-10-CM

## 2017-09-09 DIAGNOSIS — R77.0 ABNORMAL ALBUMIN: ICD-10-CM

## 2017-09-09 DIAGNOSIS — R73.9 HYPERGLYCEMIA: ICD-10-CM

## 2017-09-09 DIAGNOSIS — R11.0 NAUSEA ALONE: ICD-10-CM

## 2017-09-09 DIAGNOSIS — D72.819 LEUKOPENIA, UNSPECIFIED TYPE: ICD-10-CM

## 2017-09-09 DIAGNOSIS — K75.81 NASH (NONALCOHOLIC STEATOHEPATITIS): ICD-10-CM

## 2017-09-09 DIAGNOSIS — R10.84 ABDOMINAL PAIN, GENERALIZED: Primary | ICD-10-CM

## 2017-09-09 LAB
ALBUMIN SERPL-MCNC: 2.3 G/DL (ref 3.4–5)
ALBUMIN/GLOB SERPL: 0.5 {RATIO} (ref 0.8–1.7)
ALP SERPL-CCNC: 126 U/L (ref 45–117)
ALT SERPL-CCNC: 34 U/L (ref 13–56)
AMMONIA PLAS-SCNC: 89 UMOL/L (ref 11–32)
ANION GAP SERPL CALC-SCNC: 5 MMOL/L (ref 3–18)
APPEARANCE UR: CLEAR
AST SERPL-CCNC: 48 U/L (ref 15–37)
BACTERIA URNS QL MICRO: ABNORMAL /HPF
BASOPHILS # BLD: 0 K/UL (ref 0–0.06)
BASOPHILS NFR BLD: 1 % (ref 0–2)
BILIRUB DIRECT SERPL-MCNC: 1.2 MG/DL (ref 0–0.2)
BILIRUB SERPL-MCNC: 3.2 MG/DL (ref 0.2–1)
BILIRUB UR QL: NEGATIVE
BUN SERPL-MCNC: 7 MG/DL (ref 7–18)
BUN/CREAT SERPL: 11 (ref 12–20)
CALCIUM SERPL-MCNC: 8.6 MG/DL (ref 8.5–10.1)
CHLORIDE SERPL-SCNC: 103 MMOL/L (ref 100–108)
CO2 SERPL-SCNC: 29 MMOL/L (ref 21–32)
COLOR UR: YELLOW
CREAT SERPL-MCNC: 0.65 MG/DL (ref 0.6–1.3)
DIFFERENTIAL METHOD BLD: ABNORMAL
EOSINOPHIL # BLD: 0.1 K/UL (ref 0–0.4)
EOSINOPHIL NFR BLD: 3 % (ref 0–5)
EPITH CASTS URNS QL MICRO: ABNORMAL /LPF (ref 0–5)
ERYTHROCYTE [DISTWIDTH] IN BLOOD BY AUTOMATED COUNT: 14.6 % (ref 11.6–14.5)
GLOBULIN SER CALC-MCNC: 4.8 G/DL (ref 2–4)
GLUCOSE SERPL-MCNC: 255 MG/DL (ref 74–99)
GLUCOSE UR STRIP.AUTO-MCNC: 250 MG/DL
HCT VFR BLD AUTO: 32.6 % (ref 35–45)
HGB BLD-MCNC: 11.6 G/DL (ref 12–16)
HGB UR QL STRIP: NEGATIVE
KETONES UR QL STRIP.AUTO: NEGATIVE MG/DL
LEUKOCYTE ESTERASE UR QL STRIP.AUTO: ABNORMAL
LIPASE SERPL-CCNC: 221 U/L (ref 73–393)
LYMPHOCYTES # BLD: 0.7 K/UL (ref 0.9–3.6)
LYMPHOCYTES NFR BLD: 24 % (ref 21–52)
MAGNESIUM SERPL-MCNC: 1.6 MG/DL (ref 1.6–2.6)
MCH RBC QN AUTO: 36.6 PG (ref 24–34)
MCHC RBC AUTO-ENTMCNC: 35.6 G/DL (ref 31–37)
MCV RBC AUTO: 102.8 FL (ref 74–97)
MONOCYTES # BLD: 0.4 K/UL (ref 0.05–1.2)
MONOCYTES NFR BLD: 13 % (ref 3–10)
NEUTS SEG # BLD: 1.8 K/UL (ref 1.8–8)
NEUTS SEG NFR BLD: 59 % (ref 40–73)
NITRITE UR QL STRIP.AUTO: NEGATIVE
PH UR STRIP: 7.5 [PH] (ref 5–8)
PLATELET # BLD AUTO: 43 K/UL (ref 135–420)
PMV BLD AUTO: 8.9 FL (ref 9.2–11.8)
POTASSIUM SERPL-SCNC: 3.8 MMOL/L (ref 3.5–5.5)
PROT SERPL-MCNC: 7.1 G/DL (ref 6.4–8.2)
PROT UR STRIP-MCNC: NEGATIVE MG/DL
RBC # BLD AUTO: 3.17 M/UL (ref 4.2–5.3)
RBC #/AREA URNS HPF: NEGATIVE /HPF (ref 0–5)
SODIUM SERPL-SCNC: 137 MMOL/L (ref 136–145)
SP GR UR REFRACTOMETRY: 1.01 (ref 1–1.03)
UROBILINOGEN UR QL STRIP.AUTO: 2 EU/DL (ref 0.2–1)
WBC # BLD AUTO: 3 K/UL (ref 4.6–13.2)
WBC URNS QL MICRO: ABNORMAL /HPF (ref 0–4)

## 2017-09-09 PROCEDURE — 80076 HEPATIC FUNCTION PANEL: CPT | Performed by: NURSE PRACTITIONER

## 2017-09-09 PROCEDURE — 83735 ASSAY OF MAGNESIUM: CPT | Performed by: NURSE PRACTITIONER

## 2017-09-09 PROCEDURE — 96376 TX/PRO/DX INJ SAME DRUG ADON: CPT

## 2017-09-09 PROCEDURE — 81001 URINALYSIS AUTO W/SCOPE: CPT | Performed by: NURSE PRACTITIONER

## 2017-09-09 PROCEDURE — 96374 THER/PROPH/DIAG INJ IV PUSH: CPT

## 2017-09-09 PROCEDURE — 82140 ASSAY OF AMMONIA: CPT | Performed by: NURSE PRACTITIONER

## 2017-09-09 PROCEDURE — 99285 EMERGENCY DEPT VISIT HI MDM: CPT

## 2017-09-09 PROCEDURE — 74011250637 HC RX REV CODE- 250/637: Performed by: NURSE PRACTITIONER

## 2017-09-09 PROCEDURE — 83690 ASSAY OF LIPASE: CPT | Performed by: NURSE PRACTITIONER

## 2017-09-09 PROCEDURE — 80048 BASIC METABOLIC PNL TOTAL CA: CPT | Performed by: NURSE PRACTITIONER

## 2017-09-09 PROCEDURE — 96375 TX/PRO/DX INJ NEW DRUG ADDON: CPT

## 2017-09-09 PROCEDURE — 85025 COMPLETE CBC W/AUTO DIFF WBC: CPT | Performed by: NURSE PRACTITIONER

## 2017-09-09 PROCEDURE — 74011250636 HC RX REV CODE- 250/636: Performed by: NURSE PRACTITIONER

## 2017-09-09 PROCEDURE — 96361 HYDRATE IV INFUSION ADD-ON: CPT

## 2017-09-09 RX ORDER — MECLIZINE HYDROCHLORIDE 25 MG/1
25 TABLET ORAL 2 TIMES DAILY
Qty: 10 TAB | Refills: 0 | Status: SHIPPED | OUTPATIENT
Start: 2017-09-09 | End: 2017-09-09

## 2017-09-09 RX ORDER — MECLIZINE HYDROCHLORIDE 25 MG/1
25 TABLET ORAL 2 TIMES DAILY
Qty: 10 TAB | Refills: 0 | Status: SHIPPED | OUTPATIENT
Start: 2017-09-09 | End: 2017-09-19

## 2017-09-09 RX ORDER — OXYCODONE HYDROCHLORIDE 5 MG/1
5 TABLET ORAL
Qty: 8 TAB | Refills: 0 | Status: SHIPPED | OUTPATIENT
Start: 2017-09-09 | End: 2017-12-28

## 2017-09-09 RX ORDER — LACTULOSE 10 G/15ML
20 SOLUTION ORAL; RECTAL 2 TIMES DAILY
Qty: 480 ML | Refills: 0 | Status: SHIPPED | OUTPATIENT
Start: 2017-09-09 | End: 2017-09-17

## 2017-09-09 RX ORDER — HYDROMORPHONE HYDROCHLORIDE 1 MG/ML
1 INJECTION, SOLUTION INTRAMUSCULAR; INTRAVENOUS; SUBCUTANEOUS
Status: COMPLETED | OUTPATIENT
Start: 2017-09-09 | End: 2017-09-09

## 2017-09-09 RX ORDER — SODIUM CHLORIDE 9 MG/ML
125 INJECTION, SOLUTION INTRAVENOUS CONTINUOUS
Status: DISCONTINUED | OUTPATIENT
Start: 2017-09-09 | End: 2017-09-09 | Stop reason: HOSPADM

## 2017-09-09 RX ORDER — ONDANSETRON 2 MG/ML
4 INJECTION INTRAMUSCULAR; INTRAVENOUS
Status: COMPLETED | OUTPATIENT
Start: 2017-09-09 | End: 2017-09-09

## 2017-09-09 RX ORDER — MECLIZINE HCL 12.5 MG 12.5 MG/1
25 TABLET ORAL
Status: COMPLETED | OUTPATIENT
Start: 2017-09-09 | End: 2017-09-09

## 2017-09-09 RX ORDER — ONDANSETRON 4 MG/1
4 TABLET, ORALLY DISINTEGRATING ORAL
Qty: 10 TAB | Refills: 0 | Status: SHIPPED | OUTPATIENT
Start: 2017-09-09 | End: 2017-12-28

## 2017-09-09 RX ADMIN — HYDROMORPHONE HYDROCHLORIDE 1 MG: 1 INJECTION, SOLUTION INTRAMUSCULAR; INTRAVENOUS; SUBCUTANEOUS at 08:17

## 2017-09-09 RX ADMIN — HYDROMORPHONE HYDROCHLORIDE 1 MG: 1 INJECTION, SOLUTION INTRAMUSCULAR; INTRAVENOUS; SUBCUTANEOUS at 06:52

## 2017-09-09 RX ADMIN — LACTULOSE 30 G: 20 SOLUTION ORAL at 07:35

## 2017-09-09 RX ADMIN — ONDANSETRON 4 MG: 2 INJECTION INTRAMUSCULAR; INTRAVENOUS at 06:52

## 2017-09-09 RX ADMIN — MECLIZINE 25 MG: 12.5 TABLET ORAL at 08:17

## 2017-09-09 RX ADMIN — SODIUM CHLORIDE 125 ML/HR: 900 INJECTION, SOLUTION INTRAVENOUS at 06:52

## 2017-09-09 NOTE — ED PROVIDER NOTES
HPI Comments: Obed Macias is a 49year old female who presents to the ED with a c/o abdominal pain for the past two days. The pain has been getting steadily worse since onset. Adds that last night her \"navel was bleeding. \"   When questioned concerning this, Pt states \"they told be it was a vein in there - and sometimes it bleeds. \"  Denies vaginal bleeding. Admits to nausea, and a long Hx of non alcoholic steatosis and cirrhosis of the liver. She has been hostpialized for this in the past.      Patient is a 49 y.o. female presenting with abdominal pain. The history is provided by the patient and medical records. History limited by: No communication barrier. Abdominal Pain    This is a new problem. The current episode started 2 days ago. The problem occurs constantly. The problem has not changed since onset. The pain is located in the generalized abdominal region. The quality of the pain is aching. The pain is severe. Associated symptoms include nausea. Nothing worsens the pain. The pain is relieved by nothing. Past workup includes CT scan. Past medical history comments: HANKS, Cirrhosis of liver, DM2. Past Medical History:   Diagnosis Date    Bilateral knee pain     Cirrhosis of liver (HCC)     Depression     Diabetes ([facility] [address]     Diastolic hypertension     Fatty liver     Gastritis     Hypertension     Ill-defined condition     Inflammation of lymphatics     Liver disease     cirrhosis    Menorrhagia     HANKS (nonalcoholic steatohepatitis)     Nausea     Osteoarthritis     Right flank pain     RUQ abdominal pain     Sleep apnea     Does not use consistently    Thrombocytopenia (HCC)        Past Surgical History:   Procedure Laterality Date    ENDOSCOPY, COLON, DIAGNOSTIC  5/20/2013    HX HYSTERECTOMY      HX OTHER SURGICAL      liver biopsy    HX OVARIAN CYST REMOVAL      Bilateral         History reviewed. No pertinent family history.     Social History     Social History    Marital status:      Spouse name: N/A    Number of children: N/A    Years of education: N/A     Occupational History    Not on file. Social History Main Topics    Smoking status: Never Smoker    Smokeless tobacco: Never Used    Alcohol use No    Drug use: No    Sexual activity: Yes     Partners: Male     Birth control/ protection: None     Other Topics Concern    Blood Transfusions Yes     prior to hysterectomy    Occupational Exposure No    Hobby Hazards No    Stress Concern No    Weight Concern Yes    Exercise No    Seat Belt Yes    Self-Exams No     Social History Narrative         ALLERGIES: Review of patient's allergies indicates no known allergies. Review of Systems   Constitutional: Negative. HENT: Negative. Eyes: Negative. Respiratory: Negative. Cardiovascular: Negative. Gastrointestinal: Positive for abdominal pain and nausea. Endocrine: Negative. Genitourinary: Negative. Musculoskeletal: Negative. Skin: Negative. Allergic/Immunologic: Negative. Neurological: Negative. Hematological: Negative. Psychiatric/Behavioral: Negative. Vitals:    09/09/17 0619   BP: 146/73   Pulse: 95   Resp: 18   Temp: 98.3 °F (36.8 °C)   SpO2: 99%   Weight: 124.3 kg (274 lb)   Height: 5' 4\" (1.626 m)            Physical Exam   Constitutional: She is oriented to person, place, and time. She appears well-developed and well-nourished. No distress. HENT:   Head: Normocephalic and atraumatic. Eyes: EOM are normal. Pupils are equal, round, and reactive to light. Neck: Normal range of motion. Neck supple. Cardiovascular: Normal rate, regular rhythm, normal heart sounds and intact distal pulses. Pulmonary/Chest: Effort normal. No respiratory distress. She has no wheezes. She has no rales. Abdominal: Soft. She exhibits distension (Consistent with obesity). There is tenderness. There is no rebound and no guarding.    Genitourinary:   Genitourinary Comments: NE Musculoskeletal: Normal range of motion. Neurological: She is alert and oriented to person, place, and time. No cranial nerve deficit. Coordination normal.   Skin: Skin is warm and dry. Pt skin appears icteric. Pitting edema noted bilateral lower extremities. Psychiatric: She has a normal mood and affect. Her behavior is normal.   Nursing note and vitals reviewed. MDM  Number of Diagnoses or Management Options  Abdominal pain, generalized:   Abnormal albumin:   Dizziness:   Elevated bilirubin:   Elevated liver enzymes:   Hyperglycemia:   Increased ammonia level:   Leukopenia, unspecified type:   HANKS (nonalcoholic steatohepatitis): Thrombocytopenia [facility]):   Diagnosis management comments: PROGRESS NOTE:  Pt states she is feeling better, but still has a bit of dizziness. Will give Antivert for the same. I discussed lab findings with her, and advised that they appear similar to those that she has had in the recent past.   CONSULT:  Discussed Platelets with DR Claudeen Lavender, as well as elevated ammonia lev el. I also consulted Dr Rose Cifuentes, who advised that the Pt bilirubin is actually improving today. He suggested that she call his office on Monday for a follow up appointment. If she becomes worse before then, I will advise the Pt to come back to the ED for re-evaluation. Allison Richadr NP  8:27 AM           Amount and/or Complexity of Data Reviewed  Clinical lab tests: ordered  Discuss the patient with other providers: yes (Dr Austin Shin, Dr Rose Cifuentes.  )  Independent visualization of images, tracings, or specimens: yes    Risk of Complications, Morbidity, and/or Mortality  Presenting problems: moderate  Diagnostic procedures: moderate  Management options: moderate    Patient Progress  Patient progress: stable    ED Course       Procedures    Diagnosis:   1. Abdominal pain, generalized    2. Thrombocytopenia ([address]    3. Increased ammonia level    4. Leukopenia, unspecified type    5. Hyperglycemia    6. Abnormal albumin    7. Elevated liver enzymes    8. Elevated bilirubin    9. HANKS (nonalcoholic steatohepatitis)    10. Dizziness    11. Nausea alone          Disposition:   Discharged to Home. Follow-up Information     Follow up With Details Comments Contact Info    Peter Maher MD Call for a follow up.   [address]      Nabil Nicolas MD Call on Monday morning for a follow uop. [place] [id number] [place] [address]            Patient's Medications   Start Taking    LACTULOSE (CHRONULAC) 10 GRAM/15 ML SOLUTION    Take 30 mL by mouth two (2) times a day for 8 days. MECLIZINE (ANTIVERT) 25 MG TABLET    Take 1 Tab by mouth two (2) times a day for 10 days. ONDANSETRON (ZOFRAN ODT) 4 MG DISINTEGRATING TABLET    Take 1 Tab by mouth every eight (8) hours as needed for Nausea. OXYCODONE IR (ROXICODONE) 5 MG IMMEDIATE RELEASE TABLET    Take 1 Tab by mouth every six (6) hours as needed for Pain. Max Daily Amount: 20 mg. Continue Taking    CLOTRIMAZOLE (LOTRIMIN) 1 % TOPICAL CREAM    Apply a thin layer over affected area twice daily for up to 14 days. FUROSEMIDE (LASIX) 20 MG TABLET    Take 1 Tab by mouth daily. GABAPENTIN (NEURONTIN) 300 MG CAPSULE    Take 300 mg by mouth daily. LISINOPRIL (PRINIVIL, ZESTRIL) 20 MG TABLET    Take 20 mg by mouth daily. METFORMIN (GLUCOPHAGE) 500 MG TABLET    Take 500 mg by mouth two (2) times daily (with meals). SERTRALINE (ZOLOFT) 50 MG TABLET    TAKE 1 TABLET BY MOUTH EVERY DAY    SITAGLIPTIN (JANUVIA) 100 MG TABLET    Take 100 mg by mouth daily. SPIRONOLACTONE (ALDACTONE) 50 MG TABLET    Take 1 Tab by mouth daily.    These Medications have changed    No medications on file   Stop Taking    LACTULOSE (CHRONULAC) 10 GRAM/15 ML SOLUTION    TAKE 30 ML'S BY MOUTH THREE TIMES A DAY    ONDANSETRON (ZOFRAN ODT) 4 MG DISINTEGRATING TABLET    Take 1 Tab by mouth every eight (8) hours as needed for Nausea. OXYCODONE (OXYIR) 5 MG CAPSULE    Take 1 Cap by mouth every four (4) hours as needed. Max Daily Amount: 30 mg.

## 2017-09-09 NOTE — DISCHARGE INSTRUCTIONS
[employer] Activation    Thank you for requesting access to [employer]. Please follow the instructions below to securely access and download your online medical record. [employer] allows you to send messages to your doctor, view your test results, renew your prescriptions, schedule appointments, and more. How Do I Sign Up? 1. In your internet browser, go to [email or web address]  2. Click on the First Time User? Click Here link in the Sign In box. You will be redirect to the New Member Sign Up page. 3. Enter your [employer] Access Code exactly as it appears below. You will not need to use this code after youve completed the sign-up process. If you do not sign up before the expiration date, you must request a new code. [employer] Access Code: [email or web address]  Expires: 2017  5:27 AM (This is the date your [employer] access code will )    4. Enter the last four digits of your Social Security Number (xxxx) and Date of Birth (mm/dd/yyyy) as indicated and click Submit. You will be taken to the next sign-up page. 5. Create a [employer]St[employer] ID. This will be your [employer] login ID and cannot be changed, so think of one that is secure and easy to remember. 6. Create a [employer] password. You can change your password at any time. 7. Enter your Password Reset Question and Answer. This can be used at a later time if you forget your password. 8. Enter your e-mail address. You will receive e-mail notification when new information is available in [phone] 9. Click Sign Up. You can now view and download portions of your medical record. 10. Click the Download Summary menu link to download a portable copy of your medical information. Additional Information    If you have questions, please visit the Frequently Asked Questions section of the [employer] website at [email or web address]. [facility] Remember, [employer] is NOT to be used for urgent needs. For medical emergencies, dial 911. Recommend clear fluids for 24 hours. Reintroduce solids slowly. Continue to take your prescribed meds as previously ordered. Call Dr Betancourt's office on Monday morning for a short term follow up. Call your family doctor for follow up as well. I:f your symptoms worsen before then, return to the ER at once for re-evaluation.

## 2017-10-07 RX ORDER — SPIRONOLACTONE 50 MG/1
TABLET, FILM COATED ORAL
Qty: 30 TAB | Refills: 0 | Status: SHIPPED | OUTPATIENT
Start: 2017-10-07 | End: 2017-11-06 | Stop reason: SDUPTHER

## 2017-11-07 RX ORDER — SPIRONOLACTONE 50 MG/1
TABLET, FILM COATED ORAL
Qty: 30 TAB | Refills: 0 | Status: SHIPPED | OUTPATIENT
Start: 2017-11-07 | End: 2017-11-08 | Stop reason: SDUPTHER

## 2017-11-08 ENCOUNTER — OFFICE VISIT (OUTPATIENT)
Dept: HEMATOLOGY | Age: 57
End: 2017-11-08

## 2017-11-08 VITALS
RESPIRATION RATE: 18 BRPM | HEART RATE: 82 BPM | OXYGEN SATURATION: 99 % | SYSTOLIC BLOOD PRESSURE: 130 MMHG | HEIGHT: 64 IN | DIASTOLIC BLOOD PRESSURE: 58 MMHG | WEIGHT: 293 LBS | BODY MASS INDEX: 50.02 KG/M2

## 2017-11-08 DIAGNOSIS — K76.0 NAFLD (NONALCOHOLIC FATTY LIVER DISEASE): Primary | ICD-10-CM

## 2017-11-08 RX ORDER — MECLIZINE HYDROCHLORIDE 25 MG/1
TABLET ORAL
COMMUNITY
End: 2018-02-21

## 2017-11-08 RX ORDER — FUROSEMIDE 20 MG/1
20 TABLET ORAL DAILY
Qty: 90 TAB | Refills: 3 | Status: SHIPPED | OUTPATIENT
Start: 2017-11-08 | End: 2018-02-14

## 2017-11-08 RX ORDER — SPIRONOLACTONE 50 MG/1
TABLET, FILM COATED ORAL
Qty: 30 TAB | Refills: 3 | Status: SHIPPED | OUTPATIENT
Start: 2017-11-08 | End: 2018-02-14

## 2017-11-08 NOTE — PROGRESS NOTES
[address]ann, [address], [facility] [place]       Nikhil Robison, TRICIA Cartwright, DONNA Bojorquez, IVY-BC   TRICIA Llanos NP        at [address], [address] [facility], [address]     [phone]     FAX: [phone]    at [address], [id number] [place] [address], [address]     [phone]     FAX: [phone]       Patient Care Team:  Margo Rayo MD as PCP - General (Internal Medicine)  Jeyson Hughes MD (General Surgery)  Shakira Wang MD (Hematology and Oncology)  Cathryn Chen MD (Gastroenterology)      Problem List  Date Reviewed: 11/12/2017          Codes Class Noted    NAFLD (nonalcoholic fatty liver disease) ICD-10-CM: K76.0  ICD-9-CM: 571.8  11/12/2017        Thrombocytopenia ([facility] ICD-10-CM: D69.6  ICD-9-CM: 287.5  8/27/2017        Ascites ICD-10-CM: R18.8  ICD-9-CM: 789.59  5/19/2017        Diabetes mellitus type 2, controlled ([facility] [address] ICD-10-CM: E11.9  ICD-9-CM: 250.00  1/31/2017        HTN (hypertension) ICD-10-CM: I10  ICD-9-CM: 401.9  1/31/2017        Obesity ICD-10-CM: E66.9  ICD-9-CM: 278.00  1/31/2017        Candidal intertrigo ICD-10-CM: B37.2  ICD-9-CM: 112.3  7/28/2014        Acalculous cholecystitis ICD-10-CM: K81.9  ICD-9-CM: 575.10  2/27/2014        Cirrhosis ([facility] [address] ICD-10-CM: K74.60  ICD-9-CM: 571.5  2/27/2014        Acquired absence of genital organ ICD-10-CM: Z90.79  ICD-9-CM: V45.77  4/30/2012              Francine Barton returns to the [address] for management cirrhosis which is thought to be secondary to HANKS. The active problem list, all pertinent past medical history, medications, radiologic findings and laboratory findings related to the liver disorder were reviewed with the patient. The patient is a 49 y.o. female who was found to have chronic liver disease in 2011.       The patient has been counceled regarding [employer] and the need to loose weight. Her BMI has not changed from about 50 in over 6 months. She claims to be adhering to a diet. A liver biopsy demonstrated that she had cirrhosis and HANKS. The patient has not developed any major complications of cirrhosis to date. She has a small varices by EGD from 1/2017. Hepatic encephalopathy is currently controlled with lactulose. It does not appear this is helping. She notes she still has some intermittent confsuion. It si not clear this is HE. She cannot undergo gastric sleeve because of cirrhosis. She has severe arthritis of the knees and cannot walk. She cannot undergo knee replacement because of Cirrhosis. The patient has had falls and confusion. It is not clear if this is due to hepatic encephalopathy. The patient notes fatigue, pain in the right side over the liver, and problems concentrating,     The patient has limitations in functional activities secondary to these symptoms. ALLERGIES  No Known Allergies    MEDICATIONS  Current Outpatient Prescriptions   Medication Sig    meclizine (ANTIVERT) 25 mg tablet Take  by mouth three (3) times daily as needed.  lactulose (CHRONULAC) 10 gram/15 mL solution Take  by mouth three (3) times daily.  furosemide (LASIX) 20 mg tablet Take 1 Tab by mouth daily.  spironolactone (ALDACTONE) 50 mg tablet TAKE 1 TABLET BY MOUTH EVERY DAY    ondansetron (ZOFRAN ODT) 4 mg disintegrating tablet Take 1 Tab by mouth every eight (8) hours as needed for Nausea.  metFORMIN (GLUCOPHAGE) 500 mg tablet Take 500 mg by mouth two (2) times daily (with meals).  SITagliptin (JANUVIA) 100 mg tablet Take 100 mg by mouth daily.  lisinopril (PRINIVIL, ZESTRIL) 20 mg tablet Take 20 mg by mouth daily.  clotrimazole (LOTRIMIN) 1 % topical cream Apply a thin layer over affected area twice daily for up to 14 days.     gabapentin (NEURONTIN) 300 mg capsule Take 300 mg by mouth daily.  oxyCODONE IR (ROXICODONE) 5 mg immediate release tablet Take 1 Tab by mouth every six (6) hours as needed for Pain. Max Daily Amount: 20 mg.    sertraline (ZOLOFT) 50 mg tablet TAKE 1 TABLET BY MOUTH EVERY DAY     No current facility-administered medications for this visit. SYSTEM REVIEW NOT RELATED TO LIVER DISEASE OR REVIEWED ABOVE:  Constitution systems: Negative for fever, chills, weight gain, weight loss. Eyes: Negative for visual changes. ENT: Negative for sore throat, painful swallowing. Respiratory: Negative for cough, hemoptysis, SOB. Cardiology: Negative for chest pain, palpitations. GI:  Negative for constipation or diarrhea. : Negative for urinary frequency, dysuria, hematuria, nocturia. Skin: Negative for rash. Hematology: Negative for easy bruising, blood clots. Musculo-skelatal: Negative for back pain, muscle pain, weakness. Neurologic: Negative for headaches, dizziness, vertigo, memory problems not related to HE. Psychology: Negative for anxiety, depression. FAMILY HISTORY:  There is no family history of liver disease. SOCIAL HISTORY:  The patient is . The patient has 6 children  The patient has never used tobacco products. The patient has never consumed significant amounts of alcohol. The patient currently receiving disability. PHYSICAL EXAMINATION:  Visit Vitals    /58 (BP 1 Location: Left arm, BP Patient Position: Sitting)    Pulse 82    Resp 18    Ht 5' 4\" (1.626 m)    Wt 295 lb (133.8 kg)    LMP 04/25/2011    SpO2 99%    BMI 50.64 kg/m2     General: No acute distress. Massive obesity. Eyes: Sclera mildly icteric. ENT: No oral lesions. Thyroid normal.  Nodes: No adenopathy. Skin: Spider angiomata. No jaundice. No palmar erythema. Respiratory: Lungs clear to auscultation. Cardiovascular: Regular heart rate. No murmurs. No JVD. Abdomen: Obese. Soft non-tender.   Liver size normal to percussion/palpation. Spleen not palpable. No obvious ascites. Extremities: Trace edema. No muscle wasting. No gross arthritic changes. Neurologic: Alert and oriented. Cranial nerves grossly intact. Mild tremor with outstretched hands. LABORATORY STUDIES:  [facility] [place] of [address] [place] [address] & Units 6/12/2017   WBC 4.6 - 13.2 K/uL 4.2   ANC 1.8 - 8.0 K/UL 2.4   HGB 12.0 - 16.0 g/dL 11.5    - 420 K/uL 57 (LL)   INR 0.8 - 1.2   1.3 (H)   AST 15 - 37 U/L 65 (H)   ALT 13 - 56 U/L 31   Alk Phos 45 - 117 U/L 130 (H)   Bili, Total 0.2 - 1.0 MG/DL 6.0 (H)   Bili, Direct 0.00 - 0.40 mg/dL 1.96 (H)   Albumin 3.4 - 5.0 g/dL 2.6 (L)   BUN 7.0 - 18 MG/DL 5 (L)   Creat 0.6 - 1.3 MG/DL 0.49 (L)   Na 136 - 145 mmol/L 138   K 3.5 - 5.5 mmol/L 4.0   Cl 100 - 108 mmol/L 100   CO2 21 - 32 mmol/L 25   Glucose 74 - 99 mg/dL 194 (H)   Magnesium 1.6 - 2.6 mg/dL    Ammonia 11 - 32 UMOL/L      SEROLOGIES:  Serologies Latest Ref Rng & Units 6/12/2017 7/28/2014   Hep B Surface Ag Negative  Negative   Hep C Ab 0.0 - 0.9 s/co ratio  <0.1   Ferritin 15 - 150 ng/mL 327 (H)    Iron % Saturation 15 - 55 % >90 (HH)    Alpha-1 antitrypsin level 90 - 200 mg/dL 134      LIVER HISTOLOGY:  6/2011. Cirrhosis and HANKS.    ENDOSCOPIC PROCEDURES:  1/2017. EGD by Dr Mckinley Goodpasture. Small esophageal varices. RADIOLOGY:  11/2016. Ultrasound of liver. Echogenic consistent with cirrhosis. No liver mass lesions. No dilated bile ducts. No ascites. 5/2017. Ultrasound of liver. Echogenic consistent with cirrhosis. No liver mass lesions. No dilated bile ducts. No ascites. 8/2017. CT scan abdomen without IV contrast.  Changes consistent with cirrhosis. No liver mass lesions. No dilated bile ducts. No ascites. OTHER TESTING:  Not available or performed    ASSESSMENT AND PLAN:  Cirrhosis secondary to HANKS. The patient has never developed any complications of cirrhosis to date. The CTP score is 8.   Child class B, MELD 15.    The patient has significant symptoms from cirrhosis despite having a MELD score of 15. The patient is not a candidate for liver transplantation because of severe obesity. We would be happy to refer her to the [facility] at [facility] but we knoiw they will want her to loose weight to a BMI under 45. Hepatic encephalopathy  has been treated with lactulose. It is not clear this is making a differnece and she likely does not have HE. Will continue lactulose for now. EGD is being performed on a regular basis by GI Dr. Gregory Ching. The patient was directed to continue all current medications at the current dosages. There are no contraindications for the patient to take any medications that are necessary for treatment of other medical issues. The patient was counseled regarding diet and exercise to achieve weight loss. The best diet for patients with fatty liver is one very low in carbohydrates and enriched with protein such as an [employer] program.      The patient claims to be adhering to [employer] but she has not lost any weight. Thrombocytopenia secondary to cirrhosis. There is no evidence of overt bleeding. There is no indication for platelet transfusions or pharmacologic treatment to increase the platelet count. [address] screening has recently been performed and does not suggest [address] The next liver imaging study will be performed in 2/2017. All of the above issues were discussed with the patient. All questions were answered. The patient expressed a clear understanding of the above. [facility] in 3 months.     Judit Lopez MD  Liver [place] of [address] [place] [address], [address] [place] [address], [address] [place] [address] - [address]  [phone]

## 2017-11-08 NOTE — PROGRESS NOTES
Mor Jerez is a 56 y.o. female    No chief complaint on file. 1. Have you been to the ER, urgent care clinic or hospitalized since your last visit? YES.     2. Have you seen or consulted any other health care providers outside of the [address] [place] [address] since your last visit (Include any pap smears or colon screening)? YES  Patient went to er for bleeding from [address] and pain about a month and a half ago at [facility].    Learning Assessment 2/18/2014   PRIMARY LEARNER Patient   PRIMARY LANGUAGE ENGLISH   LEARNER PREFERENCE PRIMARY READING   ANSWERED BY patient   RELATIONSHIP SELF

## 2017-11-12 PROBLEM — K76.0 NAFLD (NONALCOHOLIC FATTY LIVER DISEASE): Status: ACTIVE | Noted: 2017-11-12

## 2017-12-27 ENCOUNTER — HOSPITAL ENCOUNTER (EMERGENCY)
Age: 57
Discharge: HOME | End: 2017-12-28
Attending: EMERGENCY MEDICINE
Payer: MEDICAID

## 2017-12-27 ENCOUNTER — APPOINTMENT (OUTPATIENT)
Dept: CT IMAGING | Age: 57
End: 2017-12-27
Attending: EMERGENCY MEDICINE
Payer: MEDICAID

## 2017-12-27 VITALS
HEIGHT: 64 IN | BODY MASS INDEX: 50.02 KG/M2 | RESPIRATION RATE: 12 BRPM | DIASTOLIC BLOOD PRESSURE: 75 MMHG | TEMPERATURE: 98.6 F | WEIGHT: 293 LBS | SYSTOLIC BLOOD PRESSURE: 152 MMHG | OXYGEN SATURATION: 100 % | HEART RATE: 93 BPM

## 2017-12-27 DIAGNOSIS — K74.60 CIRRHOSIS OF LIVER WITHOUT ASCITES, UNSPECIFIED HEPATIC CIRRHOSIS TYPE (HCC): ICD-10-CM

## 2017-12-27 DIAGNOSIS — R10.33 RECURRENT PERIUMBILICAL ABDOMINAL PAIN: Primary | ICD-10-CM

## 2017-12-27 DIAGNOSIS — R19.8 UMBILICAL BLEEDING: ICD-10-CM

## 2017-12-27 DIAGNOSIS — R16.1 SPLENOMEGALY: ICD-10-CM

## 2017-12-27 DIAGNOSIS — E72.20 HYPERAMMONEMIA (HCC): ICD-10-CM

## 2017-12-27 DIAGNOSIS — R60.1 GENERALIZED EDEMA: ICD-10-CM

## 2017-12-27 DIAGNOSIS — D68.9 COAGULOPATHY (HCC): ICD-10-CM

## 2017-12-27 LAB
ALBUMIN SERPL-MCNC: 2.5 G/DL (ref 3.4–5)
ALBUMIN/GLOB SERPL: 0.6 {RATIO} (ref 0.8–1.7)
ALP SERPL-CCNC: 126 U/L (ref 45–117)
ALT SERPL-CCNC: 30 U/L (ref 13–56)
AMMONIA PLAS-SCNC: 108 UMOL/L (ref 11–32)
ANION GAP SERPL CALC-SCNC: 6 MMOL/L (ref 3–18)
APTT PPP: 38.9 SEC (ref 23–36.4)
AST SERPL-CCNC: 47 U/L (ref 15–37)
BASOPHILS # BLD: 0 K/UL (ref 0–0.06)
BASOPHILS NFR BLD: 1 % (ref 0–2)
BILIRUB SERPL-MCNC: 2.7 MG/DL (ref 0.2–1)
BUN SERPL-MCNC: 9 MG/DL (ref 7–18)
BUN/CREAT SERPL: 10 (ref 12–20)
CALCIUM SERPL-MCNC: 8.4 MG/DL (ref 8.5–10.1)
CHLORIDE SERPL-SCNC: 105 MMOL/L (ref 100–108)
CO2 SERPL-SCNC: 30 MMOL/L (ref 21–32)
CREAT SERPL-MCNC: 0.86 MG/DL (ref 0.6–1.3)
DIFFERENTIAL METHOD BLD: ABNORMAL
EOSINOPHIL # BLD: 0.2 K/UL (ref 0–0.4)
EOSINOPHIL NFR BLD: 5 % (ref 0–5)
ERYTHROCYTE [DISTWIDTH] IN BLOOD BY AUTOMATED COUNT: 14.1 % (ref 11.6–14.5)
GLOBULIN SER CALC-MCNC: 4.5 G/DL (ref 2–4)
GLUCOSE SERPL-MCNC: 198 MG/DL (ref 74–99)
HCT VFR BLD AUTO: 33.9 % (ref 35–45)
HGB BLD-MCNC: 11.5 G/DL (ref 12–16)
INR PPP: 1.5 (ref 0.8–1.2)
LIPASE SERPL-CCNC: 627 U/L (ref 73–393)
LYMPHOCYTES # BLD: 0.9 K/UL (ref 0.9–3.6)
LYMPHOCYTES NFR BLD: 27 % (ref 21–52)
MCH RBC QN AUTO: 36.3 PG (ref 24–34)
MCHC RBC AUTO-ENTMCNC: 33.9 G/DL (ref 31–37)
MCV RBC AUTO: 106.9 FL (ref 74–97)
MONOCYTES # BLD: 0.5 K/UL (ref 0.05–1.2)
MONOCYTES NFR BLD: 14 % (ref 3–10)
NEUTS SEG # BLD: 1.8 K/UL (ref 1.8–8)
NEUTS SEG NFR BLD: 53 % (ref 40–73)
PLATELET # BLD AUTO: 43 K/UL (ref 135–420)
PMV BLD AUTO: 9.2 FL (ref 9.2–11.8)
POTASSIUM SERPL-SCNC: 3.9 MMOL/L (ref 3.5–5.5)
PROT SERPL-MCNC: 7 G/DL (ref 6.4–8.2)
PROTHROMBIN TIME: 17.9 SEC (ref 11.5–15.2)
RBC # BLD AUTO: 3.17 M/UL (ref 4.2–5.3)
SODIUM SERPL-SCNC: 141 MMOL/L (ref 136–145)
WBC # BLD AUTO: 3.3 K/UL (ref 4.6–13.2)

## 2017-12-27 PROCEDURE — 80053 COMPREHEN METABOLIC PANEL: CPT | Performed by: EMERGENCY MEDICINE

## 2017-12-27 PROCEDURE — 85610 PROTHROMBIN TIME: CPT | Performed by: EMERGENCY MEDICINE

## 2017-12-27 PROCEDURE — 85025 COMPLETE CBC W/AUTO DIFF WBC: CPT | Performed by: EMERGENCY MEDICINE

## 2017-12-27 PROCEDURE — 96375 TX/PRO/DX INJ NEW DRUG ADDON: CPT

## 2017-12-27 PROCEDURE — 85730 THROMBOPLASTIN TIME PARTIAL: CPT | Performed by: EMERGENCY MEDICINE

## 2017-12-27 PROCEDURE — 96374 THER/PROPH/DIAG INJ IV PUSH: CPT

## 2017-12-27 PROCEDURE — 82140 ASSAY OF AMMONIA: CPT | Performed by: EMERGENCY MEDICINE

## 2017-12-27 PROCEDURE — 74011636320 HC RX REV CODE- 636/320: Performed by: EMERGENCY MEDICINE

## 2017-12-27 PROCEDURE — 74177 CT ABD & PELVIS W/CONTRAST: CPT

## 2017-12-27 PROCEDURE — 74011250636 HC RX REV CODE- 250/636: Performed by: EMERGENCY MEDICINE

## 2017-12-27 PROCEDURE — 83690 ASSAY OF LIPASE: CPT | Performed by: EMERGENCY MEDICINE

## 2017-12-27 PROCEDURE — 99283 EMERGENCY DEPT VISIT LOW MDM: CPT

## 2017-12-27 RX ORDER — HYDROMORPHONE HCL IN 0.9% NACL 50 MG/50ML
1 PLASTIC BAG, INJECTION (ML) INJECTION
Status: COMPLETED | OUTPATIENT
Start: 2017-12-27 | End: 2017-12-27

## 2017-12-27 RX ORDER — ONDANSETRON 2 MG/ML
4 INJECTION INTRAMUSCULAR; INTRAVENOUS
Status: COMPLETED | OUTPATIENT
Start: 2017-12-27 | End: 2017-12-27

## 2017-12-27 RX ADMIN — IOPAMIDOL 93 ML: 612 INJECTION, SOLUTION INTRAVENOUS at 23:44

## 2017-12-27 RX ADMIN — Medication 1 MG: at 23:31

## 2017-12-27 RX ADMIN — ONDANSETRON 4 MG: 2 INJECTION INTRAMUSCULAR; INTRAVENOUS at 23:31

## 2017-12-28 PROCEDURE — 74011250637 HC RX REV CODE- 250/637: Performed by: EMERGENCY MEDICINE

## 2017-12-28 RX ORDER — ONDANSETRON 4 MG/1
4 TABLET, ORALLY DISINTEGRATING ORAL
Qty: 10 TAB | Refills: 0 | Status: SHIPPED | OUTPATIENT
Start: 2017-12-28 | End: 2018-06-06

## 2017-12-28 RX ORDER — DICYCLOMINE HYDROCHLORIDE 20 MG/1
20 TABLET ORAL EVERY 6 HOURS
Qty: 20 TAB | Refills: 0 | Status: SHIPPED | OUTPATIENT
Start: 2017-12-28 | End: 2018-01-03

## 2017-12-28 RX ORDER — TRAMADOL HYDROCHLORIDE 50 MG/1
50 TABLET ORAL
Qty: 20 TAB | Refills: 0 | Status: SHIPPED | OUTPATIENT
Start: 2017-12-28 | End: 2018-02-21

## 2017-12-28 RX ADMIN — LACTULOSE 30 G: 20 SOLUTION ORAL at 00:39

## 2017-12-28 NOTE — ED TRIAGE NOTES
C/o bleeding from [employer] x1 day with generalized abdominal cramping and diarrhea x1 week.

## 2017-12-28 NOTE — ED PROVIDER NOTES
HPI Comments: Kevin Morejon is a 56 y.o. Female with known h/o liver cirrhosis with c/o intermittent bleeding from navel today, resolves after cleaning. No vomiting. Diarrhea, loose stools with no blood, melena. No hematemesis. No fever. Is able to tolerate po. Good urine output with no dysuria, freq, hematuria. States she has had constant cramping for last week that has worsened tonight. No sig abd surgeries. Sx are worse with palpation. H/o similar issues with navel bleeding in past with ct done    The history is provided by the patient and medical records. Past Medical History:   Diagnosis Date    Bilateral knee pain     Cirrhosis of liver (HCC)     Depression     Diabetes ([facility] [address]     Diastolic hypertension     Fatty liver     Gastritis     Hypertension     Ill-defined condition     Inflammation of lymphatics     Liver disease     cirrhosis    Menorrhagia     HANKS (nonalcoholic steatohepatitis)     Nausea     Osteoarthritis     Right flank pain     RUQ abdominal pain     Sleep apnea     Does not use consistently    Thrombocytopenia (HCC)        Past Surgical History:   Procedure Laterality Date    ENDOSCOPY, COLON, DIAGNOSTIC  5/20/2013    HX HYSTERECTOMY      HX OTHER SURGICAL      liver biopsy    HX OVARIAN CYST REMOVAL      Bilateral         History reviewed. No pertinent family history. Social History     Social History    Marital status:      Spouse name: N/A    Number of children: N/A    Years of education: N/A     Occupational History    Not on file.      Social History Main Topics    Smoking status: Never Smoker    Smokeless tobacco: Never Used    Alcohol use No    Drug use: No    Sexual activity: Yes     Partners: Male     Birth control/ protection: None     Other Topics Concern    Blood Transfusions Yes     prior to hysterectomy    Occupational Exposure No    Hobby Hazards No    Stress Concern No    Weight Concern Yes    Exercise No    Seat Belt Yes    Self-Exams No     Social History Narrative         ALLERGIES: Review of patient's allergies indicates no known allergies. Review of Systems   Constitutional: Negative for fever. HENT: Positive for nosebleeds (intermittent). Negative for sore throat and trouble swallowing. Eyes: Negative for visual disturbance. Respiratory: Positive for cough. Negative for shortness of breath. Cardiovascular: Positive for leg swelling (chronic). Negative for chest pain. Gastrointestinal: Positive for abdominal pain. Negative for blood in stool. Endocrine: Negative for polyuria. Genitourinary: Negative for difficulty urinating and dysuria. Musculoskeletal: Negative for gait problem. Skin: Negative for rash. Neurological: Negative for syncope. Hematological: Bruises/bleeds easily. Psychiatric/Behavioral: Positive for sleep disturbance. Vitals:    12/27/17 2217   BP: 152/75   Pulse: 93   Resp: 12   Temp: 98.6 °F (37 °C)   SpO2: 100%   Weight: 143.3 kg (316 lb)   Height: 5' 4\" (1.626 m)            Physical Exam   Constitutional: She is oriented to person, place, and time. She appears well-developed and well-nourished. She appears distressed (appears in discomfort). HENT:   Head: Normocephalic and atraumatic. Right Ear: External ear normal.   Left Ear: External ear normal.   Nose: Nose normal.   Mouth/Throat: Uvula is midline, oropharynx is clear and moist and mucous membranes are normal.   Eyes: Conjunctivae are normal. No scleral icterus. Neck: Neck supple. Cardiovascular: Normal rate, regular rhythm, normal heart sounds and intact distal pulses. Pulmonary/Chest: Effort normal and breath sounds normal.   Abdominal: Soft. She exhibits no distension and no mass. There is tenderness in the periumbilical area. There is no rigidity, no rebound, no guarding and no CVA tenderness. No active bleeding noted from umbilicus, erythema, drainage. Musculoskeletal: She exhibits edema (lower legs. nonpitting). Neurological: She is alert and oriented to person, place, and time. Gait normal.   Skin: Skin is warm and dry. She is not diaphoretic. Psychiatric: Her behavior is normal.   Nursing note and vitals reviewed. [facility]  ED Course       Procedures    Vitals:  Patient Vitals for the past 12 hrs:   Temp Pulse Resp BP SpO2   12/27/17 2217 98.6 °F (37 °C) 93 12 152/75 100 %         Medications ordered:   Medications   lactulose (CHRONULAC) solution 30 g (not administered)   HYDROmorphone in NS (DILAUDID) injection 1 mg (1 mg IntraVENous Given 12/27/17 2331)   ondansetron (ZOFRAN) injection 4 mg (4 mg IntraVENous Given 12/27/17 2331)   iopamidol (ISOVUE 300) 61 % contrast injection 100 mL (93 mL IntraVENous Given 12/27/17 2344)         Lab findings:  Recent Results (from the past 12 hour(s))   CBC WITH AUTOMATED DIFF    Collection Time: 12/27/17 10:26 PM   Result Value Ref Range    WBC 3.3 (L) 4.6 - 13.2 K/uL    RBC 3.17 (L) 4.20 - 5.30 M/uL    HGB 11.5 (L) 12.0 - 16.0 g/dL    HCT 33.9 (L) 35.0 - 45.0 %    .9 (H) 74.0 - 97.0 FL    MCH 36.3 (H) 24.0 - 34.0 PG    MCHC 33.9 31.0 - 37.0 g/dL    RDW 14.1 11.6 - 14.5 %    PLATELET 43 (L) 395 - 420 K/uL    MPV 9.2 9.2 - 11.8 FL    NEUTROPHILS 53 40 - 73 %    LYMPHOCYTES 27 21 - 52 %    MONOCYTES 14 (H) 3 - 10 %    EOSINOPHILS 5 0 - 5 %    BASOPHILS 1 0 - 2 %    ABS. NEUTROPHILS 1.8 1.8 - 8.0 K/UL    ABS. LYMPHOCYTES 0.9 0.9 - 3.6 K/UL    ABS. MONOCYTES 0.5 0.05 - 1.2 K/UL    ABS. EOSINOPHILS 0.2 0.0 - 0.4 K/UL    ABS.  BASOPHILS 0.0 0.0 - 0.06 K/UL    DF AUTOMATED     PROTHROMBIN TIME + INR    Collection Time: 12/27/17 10:26 PM   Result Value Ref Range    Prothrombin time 17.9 (H) 11.5 - 15.2 sec    INR 1.5 (H) 0.8 - 1.2     LIPASE    Collection Time: 12/27/17 10:26 PM   Result Value Ref Range    Lipase 627 (H) 73 - 957 U/L   METABOLIC PANEL, COMPREHENSIVE    Collection Time: 12/27/17 10:26 PM   Result Value Ref Range    Sodium 141 136 - 145 mmol/L Potassium 3.9 3.5 - 5.5 mmol/L    Chloride 105 100 - 108 mmol/L    CO2 30 21 - 32 mmol/L    Anion gap 6 3.0 - 18 mmol/L    Glucose 198 (H) 74 - 99 mg/dL    BUN 9 7.0 - 18 MG/DL    Creatinine 0.86 0.6 - 1.3 MG/DL    BUN/Creatinine ratio 10 (L) 12 - 20      GFR est AA >60 >60 ml/min/1.73m2    GFR est non-AA >60 >60 ml/min/1.73m2    Calcium 8.4 (L) 8.5 - 10.1 MG/DL    Bilirubin, total 2.7 (H) 0.2 - 1.0 MG/DL    ALT (SGPT) 30 13 - 56 U/L    AST (SGOT) 47 (H) 15 - 37 U/L    Alk. phosphatase 126 (H) 45 - 117 U/L    Protein, total 7.0 6.4 - 8.2 g/dL    Albumin 2.5 (L) 3.4 - 5.0 g/dL    Globulin 4.5 (H) 2.0 - 4.0 g/dL    A-G Ratio 0.6 (L) 0.8 - 1.7     PTT    Collection Time: 12/27/17 10:26 PM   Result Value Ref Range    aPTT 38.9 (H) 23.0 - 36.4 SEC   AMMONIA    Collection Time: 12/27/17 10:26 PM   Result Value Ref Range    Ammonia 108 (H) 11 - 32 UMOL/L       EKG interpretation by ED Physician:      X-Ray, CT or other radiology findings or impressions:  CT ABD PELV W CONT    (Results Pending)     Larger spleen. Cholelithiasis. No free fluid, esophageal varices. Progress notes, Consult notes or additional Procedure notes:   Pain improved. Pt awake alert with no acute delirium or encephalopathic signs. Will increase lactulose for home  D/w pt need for f/u with her liver specialist to discuss changes  I have discussed with patient and/or family/sig other the results, interpretation of any imaging if performed, suspected diagnosis and treatment plan to include instructions regarding the diagnoses listed to which understanding was expressed with all questions answered      Reevaluation of patient:   Stable for dc    Disposition:  Diagnosis:   1. Recurrent periumbilical abdominal pain    2. Umbilical bleeding    3. Cirrhosis of liver without ascites, unspecified hepatic cirrhosis type (HCC)    4. Splenomegaly    5. Coagulopathy ([address]    6. Generalized edema    7.  Hyperammonemia (HCC)        Disposition: home    Follow-up Information     Follow up With Details Comments Contact Info    Keyonna Glez MD Schedule an appointment as soon as possible for a visit  [address] [place] [address]      Sapna Valdez MD Schedule an appointment as soon as possible for a visit within next week for recheck in office [address] [phone]              Patient's Medications   Start Taking    DICYCLOMINE (BENTYL) 20 MG TABLET    Take 1 Tab by mouth every six (6) hours for 20 doses. TRAMADOL (ULTRAM) 50 MG TABLET    Take 1 Tab by mouth every six (6) hours as needed for Pain. Max Daily Amount: 200 mg. Continue Taking    CLOTRIMAZOLE (LOTRIMIN) 1 % TOPICAL CREAM    Apply a thin layer over affected area twice daily for up to 14 days. FUROSEMIDE (LASIX) 20 MG TABLET    Take 1 Tab by mouth daily. GABAPENTIN (NEURONTIN) 300 MG CAPSULE    Take 300 mg by mouth daily. LISINOPRIL (PRINIVIL, ZESTRIL) 20 MG TABLET    Take 20 mg by mouth daily. MECLIZINE (ANTIVERT) 25 MG TABLET    Take  by mouth three (3) times daily as needed. METFORMIN (GLUCOPHAGE) 500 MG TABLET    Take 500 mg by mouth two (2) times daily (with meals). SERTRALINE (ZOLOFT) 50 MG TABLET    TAKE 1 TABLET BY MOUTH EVERY DAY    SPIRONOLACTONE (ALDACTONE) 50 MG TABLET    TAKE 1 TABLET BY MOUTH EVERY DAY   These Medications have changed    Modified Medication Previous Medication    LACTULOSE (CHRONULAC) 10 GRAM/15 ML SOLUTION lactulose (CHRONULAC) 10 gram/15 mL solution       Take 45 mL by mouth three (3) times daily for 30 days. Take  by mouth three (3) times daily. ONDANSETRON (ZOFRAN ODT) 4 MG DISINTEGRATING TABLET ondansetron (ZOFRAN ODT) 4 mg disintegrating tablet       Take 1 Tab by mouth every eight (8) hours as needed for Nausea. Take 1 Tab by mouth every eight (8) hours as needed for Nausea.    Stop Taking    OXYCODONE IR (ROXICODONE) 5 MG IMMEDIATE RELEASE TABLET    Take 1 Tab by mouth every six (6) hours as needed for Pain. Max Daily Amount: 20 mg. SITAGLIPTIN (JANUVIA) 100 MG TABLET    Take 100 mg by mouth daily.

## 2017-12-28 NOTE — ED NOTES
I have reviewed discharge instructions with the patient. The patient verbalized understanding. Discharge medications reviewed with patient and appropriate educational materials and side effects teaching were provided. Patient armband removed and shredded. Pt to lobby in wheelchair.

## 2018-02-10 ENCOUNTER — HOSPITAL ENCOUNTER (INPATIENT)
Age: 58
LOS: 4 days | Discharge: HOME HEALTH | DRG: 279 | End: 2018-02-14
Attending: EMERGENCY MEDICINE | Admitting: INTERNAL MEDICINE
Payer: MEDICAID

## 2018-02-10 ENCOUNTER — APPOINTMENT (OUTPATIENT)
Dept: GENERAL RADIOLOGY | Age: 58
DRG: 279 | End: 2018-02-10
Attending: EMERGENCY MEDICINE
Payer: MEDICAID

## 2018-02-10 ENCOUNTER — APPOINTMENT (OUTPATIENT)
Dept: CT IMAGING | Age: 58
DRG: 279 | End: 2018-02-10
Attending: EMERGENCY MEDICINE
Payer: MEDICAID

## 2018-02-10 DIAGNOSIS — R19.7 DIARRHEA, UNSPECIFIED TYPE: ICD-10-CM

## 2018-02-10 DIAGNOSIS — R50.9 FEVER, UNSPECIFIED FEVER CAUSE: ICD-10-CM

## 2018-02-10 DIAGNOSIS — R11.2 NON-INTRACTABLE VOMITING WITH NAUSEA, UNSPECIFIED VOMITING TYPE: ICD-10-CM

## 2018-02-10 DIAGNOSIS — R10.84 ABDOMINAL PAIN, GENERALIZED: Primary | ICD-10-CM

## 2018-02-10 DIAGNOSIS — R05.9 COUGH: ICD-10-CM

## 2018-02-10 DIAGNOSIS — M79.10 MYALGIA: ICD-10-CM

## 2018-02-10 PROBLEM — A41.9 SEPSIS (HCC): Status: ACTIVE | Noted: 2018-02-10

## 2018-02-10 LAB
ALBUMIN SERPL-MCNC: 2.4 G/DL (ref 3.4–5)
ALBUMIN/GLOB SERPL: 0.5 {RATIO} (ref 0.8–1.7)
ALP SERPL-CCNC: 113 U/L (ref 45–117)
ALT SERPL-CCNC: 29 U/L (ref 13–56)
ANION GAP SERPL CALC-SCNC: 8 MMOL/L (ref 3–18)
APPEARANCE UR: CLEAR
APTT PPP: 36.5 SEC (ref 23–36.4)
AST SERPL-CCNC: 45 U/L (ref 15–37)
BACTERIA URNS QL MICRO: NEGATIVE /HPF
BASOPHILS # BLD: 0 K/UL (ref 0–0.06)
BASOPHILS NFR BLD: 0 % (ref 0–2)
BILIRUB SERPL-MCNC: 4.7 MG/DL (ref 0.2–1)
BILIRUB UR QL: ABNORMAL
BUN SERPL-MCNC: 7 MG/DL (ref 7–18)
BUN/CREAT SERPL: 8 (ref 12–20)
CALCIUM SERPL-MCNC: 8.1 MG/DL (ref 8.5–10.1)
CHLORIDE SERPL-SCNC: 105 MMOL/L (ref 100–108)
CO2 SERPL-SCNC: 26 MMOL/L (ref 21–32)
COLOR UR: ABNORMAL
CREAT SERPL-MCNC: 0.88 MG/DL (ref 0.6–1.3)
DIFFERENTIAL METHOD BLD: ABNORMAL
EOSINOPHIL # BLD: 0 K/UL (ref 0–0.4)
EOSINOPHIL NFR BLD: 0 % (ref 0–5)
EPITH CASTS URNS QL MICRO: NORMAL /LPF (ref 0–5)
ERYTHROCYTE [DISTWIDTH] IN BLOOD BY AUTOMATED COUNT: 15.4 % (ref 11.6–14.5)
FLUAV AG NPH QL IA: NEGATIVE
FLUBV AG NOSE QL IA: NEGATIVE
GLOBULIN SER CALC-MCNC: 4.7 G/DL (ref 2–4)
GLUCOSE SERPL-MCNC: 205 MG/DL (ref 74–99)
GLUCOSE UR STRIP.AUTO-MCNC: 500 MG/DL
HCT VFR BLD AUTO: 33.9 % (ref 35–45)
HGB BLD-MCNC: 11.6 G/DL (ref 12–16)
HGB UR QL STRIP: NEGATIVE
INR PPP: 1.9 (ref 0.8–1.2)
KETONES UR QL STRIP.AUTO: NEGATIVE MG/DL
LACTATE BLD-SCNC: 2.7 MMOL/L (ref 0.4–2)
LEUKOCYTE ESTERASE UR QL STRIP.AUTO: ABNORMAL
LYMPHOCYTES # BLD: 0.2 K/UL (ref 0.9–3.6)
LYMPHOCYTES NFR BLD: 4 % (ref 21–52)
MCH RBC QN AUTO: 36 PG (ref 24–34)
MCHC RBC AUTO-ENTMCNC: 34.2 G/DL (ref 31–37)
MCV RBC AUTO: 105.3 FL (ref 74–97)
MONOCYTES # BLD: 0.4 K/UL (ref 0.05–1.2)
MONOCYTES NFR BLD: 8 % (ref 3–10)
NEUTS SEG # BLD: 4.9 K/UL (ref 1.8–8)
NEUTS SEG NFR BLD: 88 % (ref 40–73)
NITRITE UR QL STRIP.AUTO: NEGATIVE
PH UR STRIP: 7 [PH] (ref 5–8)
PLATELET # BLD AUTO: 31 K/UL (ref 135–420)
PMV BLD AUTO: 9.2 FL (ref 9.2–11.8)
POTASSIUM SERPL-SCNC: 3.8 MMOL/L (ref 3.5–5.5)
PROT SERPL-MCNC: 7.1 G/DL (ref 6.4–8.2)
PROT UR STRIP-MCNC: NEGATIVE MG/DL
PROTHROMBIN TIME: 20.7 SEC (ref 11.5–15.2)
RBC # BLD AUTO: 3.22 M/UL (ref 4.2–5.3)
RBC #/AREA URNS HPF: NORMAL /HPF (ref 0–5)
SODIUM SERPL-SCNC: 139 MMOL/L (ref 136–145)
SP GR UR REFRACTOMETRY: 1.02 (ref 1–1.03)
UROBILINOGEN UR QL STRIP.AUTO: 4 EU/DL (ref 0.2–1)
WBC # BLD AUTO: 5.6 K/UL (ref 4.6–13.2)
WBC URNS QL MICRO: NORMAL /HPF (ref 0–4)

## 2018-02-10 PROCEDURE — 93005 ELECTROCARDIOGRAM TRACING: CPT

## 2018-02-10 PROCEDURE — 65270000029 HC RM PRIVATE

## 2018-02-10 PROCEDURE — 74176 CT ABD & PELVIS W/O CONTRAST: CPT

## 2018-02-10 PROCEDURE — 96365 THER/PROPH/DIAG IV INF INIT: CPT

## 2018-02-10 PROCEDURE — 74011250636 HC RX REV CODE- 250/636: Performed by: EMERGENCY MEDICINE

## 2018-02-10 PROCEDURE — 74011000258 HC RX REV CODE- 258: Performed by: EMERGENCY MEDICINE

## 2018-02-10 PROCEDURE — 96368 THER/DIAG CONCURRENT INF: CPT

## 2018-02-10 PROCEDURE — 83605 ASSAY OF LACTIC ACID: CPT

## 2018-02-10 PROCEDURE — 87040 BLOOD CULTURE FOR BACTERIA: CPT | Performed by: EMERGENCY MEDICINE

## 2018-02-10 PROCEDURE — 85730 THROMBOPLASTIN TIME PARTIAL: CPT | Performed by: EMERGENCY MEDICINE

## 2018-02-10 PROCEDURE — 71045 X-RAY EXAM CHEST 1 VIEW: CPT

## 2018-02-10 PROCEDURE — 80053 COMPREHEN METABOLIC PANEL: CPT | Performed by: EMERGENCY MEDICINE

## 2018-02-10 PROCEDURE — 85610 PROTHROMBIN TIME: CPT | Performed by: EMERGENCY MEDICINE

## 2018-02-10 PROCEDURE — 81001 URINALYSIS AUTO W/SCOPE: CPT | Performed by: EMERGENCY MEDICINE

## 2018-02-10 PROCEDURE — 85025 COMPLETE CBC W/AUTO DIFF WBC: CPT | Performed by: EMERGENCY MEDICINE

## 2018-02-10 PROCEDURE — 87804 INFLUENZA ASSAY W/OPTIC: CPT | Performed by: EMERGENCY MEDICINE

## 2018-02-10 PROCEDURE — 96361 HYDRATE IV INFUSION ADD-ON: CPT

## 2018-02-10 PROCEDURE — 74011250636 HC RX REV CODE- 250/636

## 2018-02-10 PROCEDURE — 83036 HEMOGLOBIN GLYCOSYLATED A1C: CPT | Performed by: INTERNAL MEDICINE

## 2018-02-10 PROCEDURE — 99285 EMERGENCY DEPT VISIT HI MDM: CPT

## 2018-02-10 PROCEDURE — 96375 TX/PRO/DX INJ NEW DRUG ADDON: CPT

## 2018-02-10 RX ORDER — TRAMADOL HYDROCHLORIDE 50 MG/1
50 TABLET ORAL
Status: DISCONTINUED | OUTPATIENT
Start: 2018-02-10 | End: 2018-02-14 | Stop reason: HOSPADM

## 2018-02-10 RX ORDER — HEPARIN SODIUM 5000 [USP'U]/ML
5000 INJECTION, SOLUTION INTRAVENOUS; SUBCUTANEOUS EVERY 8 HOURS
Status: CANCELLED | OUTPATIENT
Start: 2018-02-10

## 2018-02-10 RX ORDER — MAGNESIUM SULFATE 100 %
4 CRYSTALS MISCELLANEOUS AS NEEDED
Status: DISCONTINUED | OUTPATIENT
Start: 2018-02-10 | End: 2018-02-14 | Stop reason: HOSPADM

## 2018-02-10 RX ORDER — LACTULOSE 10 G/15ML
45 SOLUTION ORAL; RECTAL
Refills: 1 | COMMUNITY
Start: 2017-12-31

## 2018-02-10 RX ORDER — ONDANSETRON 4 MG/1
4 TABLET, ORALLY DISINTEGRATING ORAL
Status: DISCONTINUED | OUTPATIENT
Start: 2018-02-10 | End: 2018-02-14 | Stop reason: HOSPADM

## 2018-02-10 RX ORDER — SPIRONOLACTONE 25 MG/1
50 TABLET ORAL DAILY
Status: DISCONTINUED | OUTPATIENT
Start: 2018-02-11 | End: 2018-02-13

## 2018-02-10 RX ORDER — SERTRALINE HYDROCHLORIDE 50 MG/1
50 TABLET, FILM COATED ORAL DAILY
Status: DISCONTINUED | OUTPATIENT
Start: 2018-02-11 | End: 2018-02-14 | Stop reason: HOSPADM

## 2018-02-10 RX ORDER — MORPHINE SULFATE 2 MG/ML
4 INJECTION, SOLUTION INTRAMUSCULAR; INTRAVENOUS
Status: ACTIVE | OUTPATIENT
Start: 2018-02-10 | End: 2018-02-11

## 2018-02-10 RX ORDER — HYDROMORPHONE HYDROCHLORIDE 1 MG/ML
INJECTION, SOLUTION INTRAMUSCULAR; INTRAVENOUS; SUBCUTANEOUS
Status: COMPLETED
Start: 2018-02-10 | End: 2018-02-10

## 2018-02-10 RX ORDER — GABAPENTIN 300 MG/1
300 CAPSULE ORAL DAILY
Status: DISCONTINUED | OUTPATIENT
Start: 2018-02-11 | End: 2018-02-14 | Stop reason: HOSPADM

## 2018-02-10 RX ORDER — SODIUM CHLORIDE 0.9 % (FLUSH) 0.9 %
5-10 SYRINGE (ML) INJECTION AS NEEDED
Status: DISCONTINUED | OUTPATIENT
Start: 2018-02-10 | End: 2018-02-14 | Stop reason: HOSPADM

## 2018-02-10 RX ORDER — VANCOMYCIN/0.9 % SOD CHLORIDE 1 G/100 ML
1000 PLASTIC BAG, INJECTION (ML) INTRAVENOUS ONCE
Status: DISCONTINUED | OUTPATIENT
Start: 2018-02-10 | End: 2018-02-10 | Stop reason: DRUGHIGH

## 2018-02-10 RX ORDER — ONDANSETRON 2 MG/ML
4 INJECTION INTRAMUSCULAR; INTRAVENOUS
Status: COMPLETED | OUTPATIENT
Start: 2018-02-10 | End: 2018-02-10

## 2018-02-10 RX ORDER — ZOLPIDEM TARTRATE 5 MG/1
5 TABLET ORAL
Status: DISCONTINUED | OUTPATIENT
Start: 2018-02-10 | End: 2018-02-13

## 2018-02-10 RX ORDER — OSELTAMIVIR PHOSPHATE 75 MG/1
75 CAPSULE ORAL EVERY 12 HOURS
Status: DISCONTINUED | OUTPATIENT
Start: 2018-02-10 | End: 2018-02-11

## 2018-02-10 RX ORDER — LEVOFLOXACIN 5 MG/ML
750 INJECTION, SOLUTION INTRAVENOUS EVERY 24 HOURS
Status: DISCONTINUED | OUTPATIENT
Start: 2018-02-10 | End: 2018-02-10

## 2018-02-10 RX ORDER — DEXTROSE 50 % IN WATER (D50W) INTRAVENOUS SYRINGE
25-50 AS NEEDED
Status: DISCONTINUED | OUTPATIENT
Start: 2018-02-10 | End: 2018-02-14 | Stop reason: HOSPADM

## 2018-02-10 RX ORDER — VANCOMYCIN/0.9 % SOD CHLORIDE 1 G/100 ML
1000 PLASTIC BAG, INJECTION (ML) INTRAVENOUS EVERY 8 HOURS
Status: DISCONTINUED | OUTPATIENT
Start: 2018-02-11 | End: 2018-02-10

## 2018-02-10 RX ORDER — CYCLOBENZAPRINE HCL 10 MG
1 TABLET ORAL
COMMUNITY
Start: 2018-02-07 | End: 2018-06-06

## 2018-02-10 RX ORDER — HYDROMORPHONE HYDROCHLORIDE 1 MG/ML
0.5 INJECTION, SOLUTION INTRAMUSCULAR; INTRAVENOUS; SUBCUTANEOUS
Status: COMPLETED | OUTPATIENT
Start: 2018-02-10 | End: 2018-02-10

## 2018-02-10 RX ORDER — SODIUM CHLORIDE 9 MG/ML
75 INJECTION, SOLUTION INTRAVENOUS CONTINUOUS
Status: DISCONTINUED | OUTPATIENT
Start: 2018-02-11 | End: 2018-02-12

## 2018-02-10 RX ORDER — INSULIN LISPRO 100 [IU]/ML
INJECTION, SOLUTION INTRAVENOUS; SUBCUTANEOUS
Status: DISCONTINUED | OUTPATIENT
Start: 2018-02-11 | End: 2018-02-14 | Stop reason: HOSPADM

## 2018-02-10 RX ADMIN — HYDROMORPHONE HYDROCHLORIDE 0.5 MG: 1 INJECTION, SOLUTION INTRAMUSCULAR; INTRAVENOUS; SUBCUTANEOUS at 21:02

## 2018-02-10 RX ADMIN — PIPERACILLIN SODIUM,TAZOBACTAM SODIUM 4.5 G: 4; .5 INJECTION, POWDER, FOR SOLUTION INTRAVENOUS at 21:53

## 2018-02-10 RX ADMIN — ONDANSETRON 4 MG: 2 INJECTION INTRAMUSCULAR; INTRAVENOUS at 21:08

## 2018-02-10 RX ADMIN — SODIUM CHLORIDE 4083 ML: 900 INJECTION, SOLUTION INTRAVENOUS at 19:45

## 2018-02-10 RX ADMIN — LEVOFLOXACIN 750 MG: 5 INJECTION, SOLUTION INTRAVENOUS at 20:54

## 2018-02-10 RX ADMIN — SODIUM CHLORIDE 2000 MG: 900 INJECTION, SOLUTION INTRAVENOUS at 22:20

## 2018-02-10 NOTE — IP AVS SNAPSHOT
[address] [address] 
[phone] Patient: Jah Lorenz MRN: [id number] :1960 About your hospitalization You were admitted on:  February 10, 2018 You last received care in the:  [address] [facility] You were discharged on:  2018 Why you were hospitalized Your primary diagnosis was:  Sepsis (Hcc) Your diagnoses also included:  Cirrhosis (Hcc), Diabetes Mellitus Type 2, Controlled (Hcc), Htn (Hypertension), Nafld (Nonalcoholic Fatty Liver Disease), Obesity, Ascites, Acute Renal Failure (Arf) (Hcc), Drug-Seeking Behavior, Medical Non-Compliance Follow-up Information Follow up With Details Comments Contact Info Manjula Herrera MD In 1 week To discusss your recent hospitalization Door [address] [id number] [phone] Discharge Orders None A check kirill indicates which time of day the medication should be taken. My Medications CONTINUE taking these medications Instructions Each Dose to Equal  
 Morning Noon Evening Bedtime  
 clotrimazole 1 % topical cream  
Commonly known as:  Huy Call Your last dose was: Your next dose is:    
   
   
 Apply a thin layer over affected area twice daily for up to 14 days. cyclobenzaprine 10 mg tablet Commonly known as:  FLEXERIL Your last dose was: Your next dose is: Take 1 Tab by mouth three (3) times daily as needed. 1 Tab  
    
   
   
   
  
 gabapentin 300 mg capsule Commonly known as:  NEURONTIN Your last dose was: Your next dose is: Take 300 mg by mouth daily. 300 mg  
    
   
   
   
  
 lactulose 10 gram/15 mL solution Commonly known as:  Linzie Reagin Your last dose was: Your next dose is: Take 45 mL by mouth three (3) times daily as needed. 45 mL meclizine 25 mg tablet Commonly known as:  ANTIVERT Your last dose was: Your next dose is: Take  by mouth three (3) times daily as needed. metFORMIN 500 mg tablet Commonly known as:  GLUCOPHAGE Your last dose was: Your next dose is: Take 500 mg by mouth two (2) times daily (with meals). 500 mg  
    
   
   
   
  
 ondansetron 4 mg disintegrating tablet Commonly known as:  ZOFRAN ODT Your last dose was: Your next dose is: Take 1 Tab by mouth every eight (8) hours as needed for Nausea. 4 mg  
    
   
   
   
  
 sertraline 50 mg tablet Commonly known as:  ZOLOFT Your last dose was: Your next dose is: TAKE 1 TABLET BY MOUTH EVERY DAY  
     
   
   
   
  
 traMADol 50 mg tablet Commonly known as:  ULTRAM  
   
Your last dose was: Your next dose is: Take 1 Tab by mouth every six (6) hours as needed for Pain. Max Daily Amount: 200 mg.  
 50 mg  
    
   
   
   
  
  
STOP taking these medications   
 furosemide 20 mg tablet Commonly known as:  LASIX  
   
  
 lisinopril 20 mg tablet Commonly known as:  PRINIVIL, ZESTRIL  
   
  
 spironolactone 50 mg tablet Commonly known as:  ALDACTONE Discharge Instructions DISCHARGE SUMMARY from Nurse PATIENT INSTRUCTIONS: 
 
 
F-face looks uneven A-arms unable to move or move unevenly S-speech slurred or non-existent T-time-call 911 as soon as signs and symptoms begin-DO NOT go Back to bed or wait to see if you get better-TIME IS BRAIN. Warning Signs of HEART ATTACK Call 911 if you have these symptoms: 
? Chest discomfort. Most heart attacks involve discomfort in the center of the chest that lasts more than a few minutes, or that goes away and comes back. It can feel like uncomfortable pressure, squeezing, fullness, or pain. ? Discomfort in other areas of the upper body. Symptoms can include pain or discomfort in one or both arms, the back, neck, jaw, or stomach. ? Shortness of breath with or without chest discomfort. ? Other signs may include breaking out in a cold sweat, nausea, or lightheadedness. Don't wait more than five minutes to call [address]! Fast action can save your life. Calling 911 is almost always the fastest way to get lifesaving treatment. Emergency Medical Services staff can begin treatment when they arrive  up to an hour sooner than if someone gets to the hospital by car. The discharge information has been reviewed with the patient. The patient verbalized understanding. Discharge medications reviewed with the patient and appropriate educational materials and side effects teaching were provided. ___________________________________________________________________________________________________________________________________ Learning About Sepsis What is sepsis? Sepsis is an intense reaction to an infection that can cause life-threatening damage to the body. The body reacts to the infection with widespread inflammation. It can damage tissue and organs and lead to very low blood pressure. Sepsis requires immediate care in a hospital. People with sepsis might need to be treated in an intensive care unit (ICU) for several days or weeks. An ICU is a part of the hospital where very sick people get care. Severe sepsis is called septic shock. It often causes extremely low blood pressure, which limits blood flow to the body. It can cause organ failure and death. A long-term or sudden illness can cause sepsis. So can an injury or a reaction to surgery. Sepsis can develop very quickly. Symptoms can include low blood pressure, breathing problems, fast heartbeat, and confusion. Other symptoms include fever or low body temperature, chills, cool clammy skin, skin rashes, and shaking. Sepsis can cause problems in many organs. How is sepsis treated? When a person has sepsis, equipment in the ICU can support many body systems. That includes breathing, circulation, fluids, and help for organs like the kidneys and heart. If the person needs help breathing, a ventilator may be used. Doctors will treat the person with antibiotics. They will try to find the infection that led to sepsis. Machines will track the person's vital signs, including temperature, blood pressure, breathing rate, and pulse rate. The person will get fluids through an IV and will get medicine to help blood flow. If the sepsis is severe, medicine may help raise the blood pressure. What can you expect when someone has sepsis? · The ICU staff will do everything they can to treat all of the problems sepsis causes, including the infection. · The person may start new treatments while still in the hospital. Different doctors may help with different symptoms. · Expect a long recovery after the person leaves the ICU. · If you need it, ask for support from friends and family. There's a lot going on in the ICU. It can be scary and confusing for patients and their families, friends, and supporters. But it's designed to keep your loved one comfortable and safe and to provide the best medical care. Where can you learn more? Go to http://adela-santhosh.info/. Enter S031 in the search box to learn more about \"Learning About Sepsis. \" Current as of: March 20, 2017 Content Version: 11.4 © 1235-7753 [employer].  Care instructions adapted under license by [employer] (which disclaims liability or warranty for this information). If you have questions about a medical condition or this instruction, always ask your healthcare professional. [address] any warranty or liability for your use of this information. Learning About Sepsis What is sepsis? Sepsis is an intense reaction to an infection that can cause life-threatening damage to the body. The body reacts to the infection with widespread inflammation. It can damage tissue and organs and lead to very low blood pressure. Sepsis requires immediate care in a hospital. People with sepsis might need to be treated in an intensive care unit (ICU) for several days or weeks. An ICU is a part of the hospital where very sick people get care. Severe sepsis is called septic shock. It often causes extremely low blood pressure, which limits blood flow to the body. It can cause organ failure and death. A long-term or sudden illness can cause sepsis. So can an injury or a reaction to surgery. Sepsis can develop very quickly. Symptoms can include low blood pressure, breathing problems, fast heartbeat, and confusion. Other symptoms include fever or low body temperature, chills, cool clammy skin, skin rashes, and shaking. Sepsis can cause problems in many organs. How is sepsis treated? When a person has sepsis, equipment in the ICU can support many body systems. That includes breathing, circulation, fluids, and help for organs like the kidneys and heart. If the person needs help breathing, a ventilator may be used. Doctors will treat the person with antibiotics. They will try to find the infection that led to sepsis. Machines will track the person's vital signs, including temperature, blood pressure, breathing rate, and pulse rate. The person will get fluids through an IV and will get medicine to help blood flow.  If the sepsis is severe, medicine may help raise the blood pressure. What can you expect when someone has sepsis? · The ICU staff will do everything they can to treat all of the problems sepsis causes, including the infection. · The person may start new treatments while still in the hospital. Different doctors may help with different symptoms. · Expect a long recovery after the person leaves the ICU. · If you need it, ask for support from friends and family. There's a lot going on in the ICU. It can be scary and confusing for patients and their families, friends, and supporters. But it's designed to keep your loved one comfortable and safe and to provide the best medical care. Where can you learn more? Go to http://adela-santhosh.info/. Enter G350 in the search box to learn more about \"Learning About Sepsis. \" Current as of: March 20, 2017 Content Version: 11.4 © 2197-3812 [employer]. Care instructions adapted under license by [employer] (which disclaims liability or warranty for this information). If you have questions about a medical condition or this instruction, always ask your healthcare professional. [address] [place] [address] any warranty or liability for your use of this information. DISCHARGE SUMMARY from Nurse PATIENT INSTRUCTIONS: 
 
 
F-face looks uneven A-arms unable to move or move unevenly S-speech slurred or non-existent T-time-call 911 as soon as signs and symptoms begin-DO NOT go Back to bed or wait to see if you get better-TIME IS BRAIN.  
 
Warning Signs of HEART ATTACK  
 
 Call 911 if you have these symptoms: 
? Chest discomfort. Most heart attacks involve discomfort in the center of the chest that lasts more than a few minutes, or that goes away and comes back. It can feel like uncomfortable pressure, squeezing, fullness, or pain. ? Discomfort in other areas of the upper body. Symptoms can include pain or discomfort in one or both arms, the back, neck, jaw, or stomach. ? Shortness of breath with or without chest discomfort. ? Other signs may include breaking out in a cold sweat, nausea, or lightheadedness. Don't wait more than five minutes to call [address]! Fast action can save your life. Calling 911 is almost always the fastest way to get lifesaving treatment. Emergency Medical Services staff can begin treatment when they arrive  up to an hour sooner than if someone gets to the hospital by car. The discharge information has been reviewed with the patient. The patient verbalized understanding. Discharge medications reviewed with the patient and appropriate educational materials and side effects teaching were provided. ___________________________________________________________________________________________________________________________________ [employer] Activation Thank you for requesting access to [facility]. Please follow the instructions below to securely access and download your online medical record. [facility] allows you to send messages to your doctor, view your test results, renew your prescriptions, schedule appointments, and more. How Do I Sign Up? 1. In your internet browser, go to [email or web address] 
2. Click on the First Time User? Click Here link in the Sign In box. You will be redirect to the New Member Sign Up page. 3. Enter your [facility] Access Code exactly as it appears below. You will not need to use this code after youve completed the sign-up process.  If you do not sign up before the expiration date, you must request a new code. 
 
Meeps Access Code: [email or web address] Expires: 3/28/2018 12:28 AM (This is the date your Meeps access code will ) 4. Enter the last four digits of your Social Security Number (xxxx) and Date of Birth (mm/dd/yyyy) as indicated and click Submit. You will be taken to the next sign-up page. 5. Create a [email or web address] ID. This will be your Meeps login ID and cannot be changed, so think of one that is secure and easy to remember. 6. Create a [email or web address] password. You can change your password at any time. 7. Enter your Password Reset Question and Answer. This can be used at a later time if you forget your password. 8. Enter your e-mail address. You will receive e-mail notification when new information is available in [address] 9. Click Sign Up. You can now view and download portions of your medical record. 10. Click the Download Summary menu link to download a portable copy of your medical information. Additional Information If you have questions, please visit the Frequently Asked Questions section of the Meeps website at [email or web address] Remember, Meeps is NOT to be used for urgent needs. For medical emergencies, dial 911. Introducing [facility]! Dear Issa Whyte: Thank you for requesting a Meeps account. Our records indicate that you already have an active Meeps account. You can access your account anytime at [email or web address] Did you know that you can access your hospital and ER discharge instructions at any time in Meeps? You can also review all of your test results from your hospital stay or ER visit. Additional Information If you have questions, please visit the Frequently Asked Questions section of the Meeps website at [email or web address] Remember, [email or web address] is NOT to be used for urgent needs. For medical emergencies, dial 911. Now available from your iPhone and Android! Unresulted Labs-Please follow up with your PCP about these lab tests Order Current Status CULTURE, BLOOD Preliminary result CULTURE, BLOOD Preliminary result Providers Seen During Your Hospitalization Provider Specialty Primary office phone Coni Rogers MD Emergency Medicine [phone] Kimmanuel Mata, [address] [facility] Internal Medicine [phone] Alma Rosa Jamil DO Internal Medicine [phone] Arleen Woodson MD Internal Medicine [phone] Immunizations Administered for This Admission Name Date Influenza Vaccine (Quad) PF 2/14/2018 Your Primary Care Physician (PCP) Primary Care Physician Office Phone Office Fax Veronica Hu [phone] You are allergic to the following Allergen Reactions Morphine Hives Recent Documentation Height Weight BMI OB Status Smoking Status 1.626 m 144.6 kg 54.72 kg/m2 Hysterectomy Never Smoker Emergency Contacts Name Discharge Info Relation Home Work Mobile Roseanne Figueroa DISCHARGE CAREGIVER [3] Daughter [21]   [phone] Patient Belongings The following personal items are in your possession at time of discharge: 
  Dental Appliances: None  Visual Aid: None      Home Medications: None   Jewelry: None  Clothing: None    Other Valuables: Cell Phone Please provide this summary of care documentation to your next provider. Signatures-by signing, you are acknowledging that this After Visit Summary has been reviewed with you and you have received a copy. Patient Signature:  ____________________________________________________________ Date:  ____________________________________________________________  
  
Tommy Bone Provider Signature:  ____________________________________________________________ Date:  ____________________________________________________________

## 2018-02-10 NOTE — IP AVS SNAPSHOT
[address] [address] 
[phone] Patient: Hugo Hernandez MRN: [id number] :1960 A check kirill indicates which time of day the medication should be taken. My Medications CONTINUE taking these medications Instructions Each Dose to Equal  
 Morning Noon Evening Bedtime  
 clotrimazole 1 % topical cream  
Commonly known as:  Rockney Mccarthy Your last dose was: Your next dose is:    
   
   
 Apply a thin layer over affected area twice daily for up to 14 days. cyclobenzaprine 10 mg tablet Commonly known as:  FLEXERIL Your last dose was: Your next dose is: Take 1 Tab by mouth three (3) times daily as needed. 1 Tab  
    
   
   
   
  
 gabapentin 300 mg capsule Commonly known as:  NEURONTIN Your last dose was: Your next dose is: Take 300 mg by mouth daily. 300 mg  
    
   
   
   
  
 lactulose 10 gram/15 mL solution Commonly known as:  Chito Carrier Your last dose was: Your next dose is: Take 45 mL by mouth three (3) times daily as needed. 45 mL  
    
   
   
   
  
 meclizine 25 mg tablet Commonly known as:  ANTIVERT Your last dose was: Your next dose is: Take  by mouth three (3) times daily as needed. metFORMIN 500 mg tablet Commonly known as:  GLUCOPHAGE Your last dose was: Your next dose is: Take 500 mg by mouth two (2) times daily (with meals). 500 mg  
    
   
   
   
  
 ondansetron 4 mg disintegrating tablet Commonly known as:  ZOFRAN ODT Your last dose was: Your next dose is: Take 1 Tab by mouth every eight (8) hours as needed for Nausea. 4 mg  
    
   
   
   
  
 sertraline 50 mg tablet Commonly known as:  ZOLOFT Your last dose was: Your next dose is: TAKE 1 TABLET BY MOUTH EVERY DAY  
     
   
   
   
  
 traMADol 50 mg tablet Commonly known as:  ULTRAM  
   
Your last dose was: Your next dose is: Take 1 Tab by mouth every six (6) hours as needed for Pain. Max Daily Amount: 200 mg.  
 50 mg  
    
   
   
   
  
  
STOP taking these medications   
 furosemide 20 mg tablet Commonly known as:  LASIX  
   
  
 lisinopril 20 mg tablet Commonly known as:  PRINIVIL, ZESTRIL  
   
  
 spironolactone 50 mg tablet Commonly known as:  ALDACTONE

## 2018-02-11 LAB
AMMONIA PLAS-SCNC: 96 UMOL/L (ref 11–32)
ANION GAP SERPL CALC-SCNC: 10 MMOL/L (ref 3–18)
ATRIAL RATE: 121 BPM
BUN SERPL-MCNC: 12 MG/DL (ref 7–18)
BUN/CREAT SERPL: 14 (ref 12–20)
CALCIUM SERPL-MCNC: 7.6 MG/DL (ref 8.5–10.1)
CALCULATED P AXIS, ECG09: 53 DEGREES
CALCULATED R AXIS, ECG10: -7 DEGREES
CALCULATED T AXIS, ECG11: 83 DEGREES
CHLORIDE SERPL-SCNC: 107 MMOL/L (ref 100–108)
CO2 SERPL-SCNC: 23 MMOL/L (ref 21–32)
CREAT SERPL-MCNC: 0.86 MG/DL (ref 0.6–1.3)
DIAGNOSIS, 93000: NORMAL
ERYTHROCYTE [DISTWIDTH] IN BLOOD BY AUTOMATED COUNT: 15.7 % (ref 11.6–14.5)
EST. AVERAGE GLUCOSE BLD GHB EST-MCNC: 114 MG/DL
GLUCOSE BLD STRIP.AUTO-MCNC: 153 MG/DL (ref 70–110)
GLUCOSE BLD STRIP.AUTO-MCNC: 157 MG/DL (ref 70–110)
GLUCOSE BLD STRIP.AUTO-MCNC: 166 MG/DL (ref 70–110)
GLUCOSE BLD STRIP.AUTO-MCNC: 177 MG/DL (ref 70–110)
GLUCOSE BLD STRIP.AUTO-MCNC: 184 MG/DL (ref 70–110)
GLUCOSE SERPL-MCNC: 187 MG/DL (ref 74–99)
HBA1C MFR BLD: 5.6 % (ref 4.2–5.6)
HCT VFR BLD AUTO: 30.6 % (ref 35–45)
HGB BLD-MCNC: 10.3 G/DL (ref 12–16)
LACTATE SERPL-SCNC: 2.4 MMOL/L (ref 0.4–2)
MCH RBC QN AUTO: 36.1 PG (ref 24–34)
MCHC RBC AUTO-ENTMCNC: 33.7 G/DL (ref 31–37)
MCV RBC AUTO: 107.4 FL (ref 74–97)
P-R INTERVAL, ECG05: 144 MS
PLATELET # BLD AUTO: 33 K/UL (ref 135–420)
PMV BLD AUTO: 9.7 FL (ref 9.2–11.8)
POTASSIUM SERPL-SCNC: 4 MMOL/L (ref 3.5–5.5)
Q-T INTERVAL, ECG07: 330 MS
QRS DURATION, ECG06: 96 MS
QTC CALCULATION (BEZET), ECG08: 468 MS
RBC # BLD AUTO: 2.85 M/UL (ref 4.2–5.3)
SODIUM SERPL-SCNC: 140 MMOL/L (ref 136–145)
VENTRICULAR RATE, ECG03: 121 BPM
WBC # BLD AUTO: 11.6 K/UL (ref 4.6–13.2)

## 2018-02-11 PROCEDURE — 82140 ASSAY OF AMMONIA: CPT | Performed by: INTERNAL MEDICINE

## 2018-02-11 PROCEDURE — 77030032490 HC SLV COMPR SCD KNE COVD -B

## 2018-02-11 PROCEDURE — 87086 URINE CULTURE/COLONY COUNT: CPT | Performed by: INTERNAL MEDICINE

## 2018-02-11 PROCEDURE — 74011250636 HC RX REV CODE- 250/636: Performed by: INTERNAL MEDICINE

## 2018-02-11 PROCEDURE — 65270000029 HC RM PRIVATE

## 2018-02-11 PROCEDURE — 77030020263 HC SOL INJ SOD CL0.9% LFCR 1000ML

## 2018-02-11 PROCEDURE — 74011636637 HC RX REV CODE- 636/637: Performed by: INTERNAL MEDICINE

## 2018-02-11 PROCEDURE — 74011000250 HC RX REV CODE- 250: Performed by: INTERNAL MEDICINE

## 2018-02-11 PROCEDURE — 80048 BASIC METABOLIC PNL TOTAL CA: CPT | Performed by: INTERNAL MEDICINE

## 2018-02-11 PROCEDURE — 82962 GLUCOSE BLOOD TEST: CPT

## 2018-02-11 PROCEDURE — 83605 ASSAY OF LACTIC ACID: CPT | Performed by: EMERGENCY MEDICINE

## 2018-02-11 PROCEDURE — 74011250637 HC RX REV CODE- 250/637: Performed by: INTERNAL MEDICINE

## 2018-02-11 PROCEDURE — 36415 COLL VENOUS BLD VENIPUNCTURE: CPT | Performed by: EMERGENCY MEDICINE

## 2018-02-11 PROCEDURE — 85027 COMPLETE CBC AUTOMATED: CPT | Performed by: INTERNAL MEDICINE

## 2018-02-11 RX ORDER — OSELTAMIVIR PHOSPHATE 75 MG/1
75 CAPSULE ORAL EVERY 12 HOURS
Status: DISCONTINUED | OUTPATIENT
Start: 2018-02-11 | End: 2018-02-12

## 2018-02-11 RX ORDER — OSELTAMIVIR PHOSPHATE 75 MG/1
CAPSULE ORAL
Status: DISPENSED
Start: 2018-02-11 | End: 2018-02-11

## 2018-02-11 RX ORDER — HYDROMORPHONE HYDROCHLORIDE 1 MG/ML
0.5 INJECTION, SOLUTION INTRAMUSCULAR; INTRAVENOUS; SUBCUTANEOUS
Status: COMPLETED | OUTPATIENT
Start: 2018-02-11 | End: 2018-02-11

## 2018-02-11 RX ORDER — IBUPROFEN 600 MG/1
600 TABLET ORAL
Status: DISCONTINUED | OUTPATIENT
Start: 2018-02-11 | End: 2018-02-13

## 2018-02-11 RX ADMIN — OSELTAMIVIR PHOSPHATE 75 MG: 75 CAPSULE ORAL at 01:56

## 2018-02-11 RX ADMIN — ZOLPIDEM TARTRATE 5 MG: 5 TABLET ORAL at 22:26

## 2018-02-11 RX ADMIN — LACTULOSE 30 G: 20 SOLUTION ORAL at 08:49

## 2018-02-11 RX ADMIN — GABAPENTIN 300 MG: 300 CAPSULE ORAL at 08:50

## 2018-02-11 RX ADMIN — IBUPROFEN 600 MG: 600 TABLET, FILM COATED ORAL at 20:00

## 2018-02-11 RX ADMIN — INSULIN LISPRO 2 UNITS: 100 INJECTION, SOLUTION INTRAVENOUS; SUBCUTANEOUS at 14:07

## 2018-02-11 RX ADMIN — ZOLPIDEM TARTRATE 5 MG: 5 TABLET ORAL at 04:58

## 2018-02-11 RX ADMIN — OSELTAMIVIR PHOSPHATE 75 MG: 75 CAPSULE ORAL at 14:20

## 2018-02-11 RX ADMIN — SODIUM CHLORIDE 75 ML/HR: 900 INJECTION, SOLUTION INTRAVENOUS at 20:01

## 2018-02-11 RX ADMIN — SODIUM CHLORIDE 75 ML/HR: 900 INJECTION, SOLUTION INTRAVENOUS at 01:17

## 2018-02-11 RX ADMIN — TRAMADOL HYDROCHLORIDE 50 MG: 50 TABLET, FILM COATED ORAL at 11:47

## 2018-02-11 RX ADMIN — WATER 1 G: 1 INJECTION INTRAMUSCULAR; INTRAVENOUS; SUBCUTANEOUS at 08:47

## 2018-02-11 RX ADMIN — HYDROMORPHONE HYDROCHLORIDE 0.5 MG: 1 INJECTION, SOLUTION INTRAMUSCULAR; INTRAVENOUS; SUBCUTANEOUS at 03:34

## 2018-02-11 RX ADMIN — TRAMADOL HYDROCHLORIDE 50 MG: 50 TABLET, FILM COATED ORAL at 01:21

## 2018-02-11 RX ADMIN — IBUPROFEN 600 MG: 600 TABLET, FILM COATED ORAL at 15:29

## 2018-02-11 RX ADMIN — SERTRALINE HYDROCHLORIDE 50 MG: 50 TABLET ORAL at 08:50

## 2018-02-11 RX ADMIN — INSULIN LISPRO 2 UNITS: 100 INJECTION, SOLUTION INTRAVENOUS; SUBCUTANEOUS at 18:16

## 2018-02-11 RX ADMIN — ONDANSETRON 4 MG: 4 TABLET, ORALLY DISINTEGRATING ORAL at 03:44

## 2018-02-11 RX ADMIN — INSULIN LISPRO 2 UNITS: 100 INJECTION, SOLUTION INTRAVENOUS; SUBCUTANEOUS at 22:26

## 2018-02-11 NOTE — PROGRESS NOTES
Pharmacy Dosing Services: Vancomycin    Indication: sepsis    Day of therapy: 0    Other Antimicrobials (Include dose, start day & day of therapy):  Levofloxacin 750 mg iv q24h. Day0: 2/10  Pip/Tazo 4.5g iv q6h. Day0: 2/10    Loading dose (date given): 2g  Current Maintenance dose: new start    Goal Vancomycin Level: 15-20  (Trough 15-20 for most infections, 20 for meningitis/osteomyelitis, pre-HD level ~25)    Vancomycin Level (if drawn): pending     Significant Cultures: pending    Renal function stable? (unstable defined as SCr increase of 0.5 mg/dL or > 50% increase from baseline, whichever is greater) (Y/N): Y     CAPD, Hemodialysis or Renal Replacement Therapy (Y/N): N     Recent Labs      02/10/18   1950   CREA  0.88   BUN  7   WBC  5.6     Temp (24hrs), Av.9 °F (39.4 °C), Min:102.9 °F (39.4 °C), Max:102.9 °F (39.4 °C)    Creatinine Clearance (Creatinine Clearance (ml/min)): 99.5 ml/min     Regimen assessment: new start  Maintenance dose: 1g iv q8h  Next scheduled level: , Mayo Santana will follow daily and adjust medications as appropriate for renal function and/or serum levels.     Thank you,  Siddhartha Reynolds

## 2018-02-11 NOTE — ED NOTES
The Sepsis Screening has been completed on arrival in the Emergency Department.     Vital signs:  Patient Vitals for the past 4 hrs:   Temp Pulse Resp BP SpO2   02/10/18 1930 (!) 102.9 °F (39.4 °C) (!) 124 16 145/63 96 %            =monitored (data validate)  MAP (Calculated): 90    =calculated (manual entry)    Is patient is 29y/o or older, meets 2 or more ABNORMAL VITAL SIGNS below, associated with SUSPECTED INFECTION?  YES     Temperature < 96.8°F (36°C) or > 100.9°F (38.3°C)   Heart Rate > 90 beats per minute   Respiratory Rate > 20 beats per minute   BP < 90 systolic    MAP < 65    IF ANSWER IS YES, CALL A CODE SEPSIS  POC LACTIC ACID ASAP AND BEGIN NURSE DRIVEN SEPSIS ORDERS WHILE AWAITING PROVIDER

## 2018-02-11 NOTE — PROGRESS NOTES
Assumed patient care from [address]. Received patient asleep. No s/s of pain/ discomfort noted. Bed is locked and in lowest position and call bell is within reach. Not in acute distress.

## 2018-02-11 NOTE — MANAGEMENT PLAN
[facility]   Discharge Planning/ Assessment    Reasons for Intervention:   Interviewed patient. Verified demographics listed on face sheet with patient; all information correct. Pt has [facility] [facility]. Patient stated their PCP is Dr Gaston Gloria and last appt 1 month ago. Patient lives alone in apt. Patient's NOK is daughter, Rosie Carroll at [phone]. Patient moderately independent with ADLs prior to admission. Other daughter is her nurses aid from approx 3-10 daily. She takes pt to appts. DME prior to admission: shower chair. Has wheelchair, but states is too small for apartment. Asking for walker. Would need bariatric which likely won't fit in apartment either. Discharge plan is Home.    Lluvia Hawkins RN BSN  Outcomes Manager  Pager # [phone]    High Risk Criteria  [x] Yes  []No   Physician Referral  [] Yes  []No        Date    Nursing Referral  [] Yes  []No        Date    Patient/Family Request  [] Yes  []No        Date       Resources:    Medicare  [] Yes  []No   Medicaid  [x] Yes  []No   No Resources  [] Yes  []No   Private Insurance  [] Yes  []No    Name/Phone Number    Other  [] Yes  []No        (i.e. Workman's Comp)         Prior Services:    Prior Services  [x] Yes  []No   Home Health  [] Yes  []No   [facility]  [x] Yes  []No        Number of Hours 6hr/day   Home Care Program  [] Yes  []No       Meals on Wheels  [] Yes  []No   Office on Aging  [] Yes  []No   Transportation Services  [] Yes  []No   Nursing Home  [] Yes  []No        Nursing Home Name    [facility]  [] Yes  []No        [address] Name    Other       Information Source:      Information obtained from  [x] Patient  [] Parent   [] Elena Mendez  [] Child  [] Spouse   [] Significant Other/Partner   [] Friend      [] EMS    [] Nursing Home Chart          [] Other:   Chart Review  [x] Yes  []No     Family/Support System:    Patient lives with  [x] Alone    [] Spouse   [] Significant Other  [] Children  [] Caretaker   [] Parent  [] Sibling     [] Other       Other Support System:    Is the patient responsible for care of others  [] Yes  [x]No   Information of person caring for patient on  discharge    Managers financial affairs independently  [x] Yes  []No   If no, explain:      Status Prior to Admission:    Mental Status  [x] Awake  [x] Alert  [x] Oriented  [] Quiet/Calm [] Lethargic/Sedated   [] Disoriented  [] Restless/Anxious  [] Combative   Personal Care  [] Dependent  [] [address]  [x] Requires Assistance   Meal Preparation Ability  [] Independent   [] Standby Assistance   [] Minimal Assistance   [x] Moderate Assistance  [] Maximum Assistance     [] Total Assistance   Chores  [] Independent with Chores   [] N/A Nursing Home Resident   [x] Requires Assistance   Bowel/Bladder  [x] Continent  [] Catheter  [] Incontinent  [] Ostomy Self-Care    [] Urine Diversion Self-Care  [] Maximum Assistance     [] Total Assistance   Number of Persons needed for assistance    DME at home  [] Alex Caal  [] Zunilda Caal   [] Commode    [] Bathroom/Grab Bars  [] Hospital Bed  [] Nebulizer  [] Oxygen           [] Raised Toilet Seat  [x] Shower Chair  [] Side Rails for Bed   [] Tub Transfer Bench   [] Stefany Bruno  [] Elvis Pires Standard      [x] Other:wheelchair   Vendor      Treatment Presently Receiving:    Current Treatments  [] Chemotherapy  [] Dialysis  [] Insulin  [] IVAB [x] IVF   [x] O2  [] PCA   [] PT   [] RT   [] Tube Feedings   [] Wound Care     Psychosocial Evaluation:    Verbalized Knowledge of Disease Process  [] Patient  []Family   Coping with Disease Process  [] Patient  []Family   Requires Further Counseling Coping with Disease Process  [] Patient  []Family     Identified Projected Needs:    Home Health Aid  [] Yes  []No   Transportation  [] Yes  []No   Education  [] Yes  []No        Specific Education     Financial Counseling  [] Yes  []No Inability to Care for Self/Will Require 24 hour care  [] Yes  []No   Pain Management  [] Yes  []No   Home Infusion Therapy  [] Yes  []No   Oxygen Therapy  [] Yes  []No   DME  [] Yes  []No   Long Term Care Placement  [] Yes  []No   Rehab  [] Yes  []No   Physical Therapy  [x] Yes  []No   Needs Anticipated At This Time  [x] Yes  []No     Intra-Hospital Referral:    [address]  [] Yes  []No     [] Yes  []No   Patient Representative  [] Yes  []No   Staff for Teaching Needs  [] Yes  []No   Specialty Teaching Needs     Diabetic Educator  [] Yes  []No   Referral for Diabetic Educator Needed  [] Yes  []No  If Yes, place order for Nutritionist or Diabetic Consult     Tentative Discharge Plan:    Home with No Services  [] Yes  []No   Home with Home Health Follow-up  [] Yes  []No        If Yes, specify type    Home Care Program  [] Yes  []No        If Yes, specify type    Meals on Wheels  [] Yes  []No   Office of Aging  [] Yes  []No   NHP  [] Yes  []No   Return to the Nursing Home  [] Yes  []No   Rehab Therapy  [] Yes  []No   Acute Rehab  [] Yes  []No   Subacute Rehab  [] Yes  []No   Private Care  [] Yes  []No   Substance Abuse Referral  [] Yes  []No   Transportation  [] Yes  []No   Chore Service  [] Yes  []No   Inpatient Hospice  [] Yes  []No   OP RT  [] Yes  [] No   OP Hemo  [] Yes  [] No   OP PT  [] Yes  []No   Support Group  [] Yes  []No   Reach to Recovery  [] Yes  []No   OP Oncology Clinic  [] Yes  []No   Clinic Appointment  [] Yes  []No   DME  [] Yes  []No   Comments    Name of D/C Planner or  Given to Patient or Family    Phone Number         Extension    Date    Time    If you are discharged home, whom do you designate to participate in your discharge plan and receive any information needed?      Enter name of designee         Phone # of designee         Address of designee         Updated         Patient refused to designate any           individual

## 2018-02-11 NOTE — H&P
History and Physical    Patient: Junacarlos Bailon               Sex: female          DOA: 2/10/2018       YOB: 1960      Age:  56 y.o. Assessment/Plan     Fever/Tachy Sepsis rule out - RIDT neg. given Vanc+Zosyn+Levaquin in ERx1, no WBC count. Gentle fluids. Presumed influenza - RIDT neg in ER however sens only 62%, started on tamiflu, droplet prec    Generalized Abd pain acute on chronic r/o SBP - no leukocytosis, CT abd non-specific, emperic rocephin. tramadol  Liver cirrhosis due to HANKS 2011 with ascites: on aldactone, lasix, lactulose. NH3x1. follows with [facility] no tylenol  -small varices by EGD from 1/2017  -Hepatic encephalopathy has recently developed 11/2017 on lactulose  -The CTP score is 8. Child class B, MELD 15. 11/2017  -not a candidate for liver transplantation because of severe obesity  DM- hold metformin. SSI AC/HS + A1c  HTN - hold lisinopril  Depression- zoloft  ALEX-non compliant with CPAP  Morbid obesity - makes ascites eval difficult  B/l Knee OA  Thrombocytopenia - plt 30k, due to liver. Frequent oral bleeding    Code status: Full  PPX:  DVT: SCD only   GI: None indicated    HPI:     Chief Complaint   Patient presents with    Fever    Generalized Body Aches       Juancarlos Bailon is a 56 y.o. female with PMHX of Liver cirrhosis, morbid obesity, DM, HTN  who presents with 2 days of fevers, diarrhea, slightly worse chronic abdominal pain, chills, and general malaise. She reports no sick contacts. Sara in by daughter. No sore throat or runny nose. She follows with Dr Barbara Mahoney and Dr Aydin Mccabe recently started on lactulose. In ER she has a temp over 102 though no known source. CT abd non-specific. UA bland. CXR unrevealing. Her Flu screen was negative. Initiated on sepsis protocol. ROS mild SOB.      Past Medical History:   Diagnosis Date    Bilateral knee pain     Cirrhosis of liver (HCC)     Depression     Diabetes ([facility]     Diastolic hypertension     Fatty liver     Gastritis     Hypertension     Ill-defined condition     Inflammation of lymphatics     Liver disease     cirrhosis    Menorrhagia     HANKS (nonalcoholic steatohepatitis)     Nausea     Osteoarthritis     Right flank pain     RUQ abdominal pain     Sleep apnea     Does not use consistently    Thrombocytopenia ([address]        Prior to Admission Medications   Prescriptions Last Dose Informant Patient Reported? Taking? clotrimazole (LOTRIMIN) 1 % topical cream   No No   Sig: Apply a thin layer over affected area twice daily for up to 14 days. furosemide (LASIX) 20 mg tablet   No No   Sig: Take 1 Tab by mouth daily. gabapentin (NEURONTIN) 300 mg capsule   Yes No   Sig: Take 300 mg by mouth daily. lisinopril (PRINIVIL, ZESTRIL) 20 mg tablet   Yes No   Sig: Take 20 mg by mouth daily. meclizine (ANTIVERT) 25 mg tablet   Yes No   Sig: Take  by mouth three (3) times daily as needed. metFORMIN (GLUCOPHAGE) 500 mg tablet   Yes No   Sig: Take 500 mg by mouth two (2) times daily (with meals). ondansetron (ZOFRAN ODT) 4 mg disintegrating tablet   No No   Sig: Take 1 Tab by mouth every eight (8) hours as needed for Nausea. sertraline (ZOLOFT) 50 mg tablet   Yes No   Sig: TAKE 1 TABLET BY MOUTH EVERY DAY   spironolactone (ALDACTONE) 50 mg tablet   No No   Sig: TAKE 1 TABLET BY MOUTH EVERY DAY   traMADol (ULTRAM) 50 mg tablet   No No   Sig: Take 1 Tab by mouth every six (6) hours as needed for Pain. Max Daily Amount: 200 mg. Facility-Administered Medications: None       Social History:  Social History     Social History    Marital status:      Spouse name: N/A    Number of children: N/A    Years of education: N/A     Occupational History    Not on file.      Social History Main Topics    Smoking status: Never Smoker    Smokeless tobacco: Never Used    Alcohol use No    Drug use: No    Sexual activity: Yes     Partners: Male     Birth control/ protection: None     Other Topics Concern    Blood Transfusions Yes     prior to hysterectomy    Occupational Exposure No    Hobby Hazards No    Stress Concern No    Weight Concern Yes    Exercise No    Seat Belt Yes    Self-Exams No     Social History Narrative       Family History:  History reviewed. No pertinent family history. Surgical History:  Past Surgical History:   Procedure Laterality Date    ENDOSCOPY, COLON, DIAGNOSTIC  5/20/2013    HX HYSTERECTOMY      HX OTHER SURGICAL      liver biopsy    HX OVARIAN CYST REMOVAL      Bilateral       Review of Systems  Constitutional:  + fever no weight loss  HEENT:  No headache or visual changes  Cardiovascular:  No chest pain or diaphoresis  Respiratory:  No coughing, wheezing, + mild shortness of breath. GI:  mild nausea no vomitting.  + diarrhea  :  No hematuria or dysuria  Skin:  + rashes no moles  Neuro:  No seizures or syncope  Hematological:  No bruising + oral bleeding  Endocrine:  + diabetes no thyroid disease    Physical Exam:      Visit Vitals    /56    Pulse (!) 109    Temp (!) 102.9 °F (39.4 °C)    Resp 26    Ht 5' 4\" (1.626 m)    Wt 136.1 kg (300 lb)    SpO2 95%    BMI 51.49 kg/m2       Physical Exam:  Gen:  No distress, alert  HEENT:  Normal cephalic atraumatic, extra-occular movements are intact. Neck:  Supple, No JVD  Lungs:  Clear bilaterally, no wheeze, no rales, normal effort  Heart:  Tachy sinus Rhythm, normal S1 and S2, no edema  Abdomen:  Obese, Soft, mild diffuse tender, normal bowel sounds, no guarding. Ascites? Extremities:  Well perfused, no cyanosis or austyn edema  Neurological:  Awake and alert, CN's are intact, normal strength throughout extremities  Skin:  No rashes or moles  Psych:  Normal thought process, does not appear anxious    Laboratory Studies: All lab results for the last 24 hours reviewed.   Recent Results (from the past 12 hour(s))   EKG, 12 LEAD, INITIAL    Collection Time: 02/10/18  7:41 PM   Result Value Ref Range Ventricular Rate 121 BPM    Atrial Rate 121 BPM    P-R Interval 144 ms    QRS Duration 96 ms    Q-T Interval 330 ms    QTC Calculation (Bezet) 468 ms    Calculated P Axis 53 degrees    Calculated R Axis -7 degrees    Calculated T Axis 83 degrees    Diagnosis       Sinus tachycardia  Cannot rule out Anterior infarct , age undetermined  Abnormal ECG  When compared with ECG of 20-AUG-2017 20:56,  Nonspecific T wave abnormality now evident in Lateral leads     CULTURE, BLOOD    Collection Time: 02/10/18  7:45 PM   Result Value Ref Range    Special Requests: PERIPHERAL      Culture result: PENDING    CULTURE, BLOOD    Collection Time: 02/10/18  7:45 PM   Result Value Ref Range    Special Requests: PERIPHERAL      Culture result: PENDING    CBC WITH AUTOMATED DIFF    Collection Time: 02/10/18  7:50 PM   Result Value Ref Range    WBC 5.6 4.6 - 13.2 K/uL    RBC 3.22 (L) 4.20 - 5.30 M/uL    HGB 11.6 (L) 12.0 - 16.0 g/dL    HCT 33.9 (L) 35.0 - 45.0 %    .3 (H) 74.0 - 97.0 FL    MCH 36.0 (H) 24.0 - 34.0 PG    MCHC 34.2 31.0 - 37.0 g/dL    RDW 15.4 (H) 11.6 - 14.5 %    PLATELET 31 (L) 612 - 420 K/uL    MPV 9.2 9.2 - 11.8 FL    NEUTROPHILS 88 (H) 40 - 73 %    LYMPHOCYTES 4 (L) 21 - 52 %    MONOCYTES 8 3 - 10 %    EOSINOPHILS 0 0 - 5 %    BASOPHILS 0 0 - 2 %    ABS. NEUTROPHILS 4.9 1.8 - 8.0 K/UL    ABS. LYMPHOCYTES 0.2 (L) 0.9 - 3.6 K/UL    ABS. MONOCYTES 0.4 0.05 - 1.2 K/UL    ABS. EOSINOPHILS 0.0 0.0 - 0.4 K/UL    ABS.  BASOPHILS 0.0 0.0 - 0.06 K/UL    DF AUTOMATED     METABOLIC PANEL, COMPREHENSIVE    Collection Time: 02/10/18  7:50 PM   Result Value Ref Range    Sodium 139 136 - 145 mmol/L    Potassium 3.8 3.5 - 5.5 mmol/L    Chloride 105 100 - 108 mmol/L    CO2 26 21 - 32 mmol/L    Anion gap 8 3.0 - 18 mmol/L    Glucose 205 (H) 74 - 99 mg/dL    BUN 7 7.0 - 18 MG/DL    Creatinine 0.88 0.6 - 1.3 MG/DL    BUN/Creatinine ratio 8 (L) 12 - 20      GFR est AA >60 >60 ml/min/1.73m2    GFR est non-AA >60 >60 ml/min/1.73m2 Calcium 8.1 (L) 8.5 - 10.1 MG/DL    Bilirubin, total 4.7 (H) 0.2 - 1.0 MG/DL    ALT (SGPT) 29 13 - 56 U/L    AST (SGOT) 45 (H) 15 - 37 U/L    Alk.  phosphatase 113 45 - 117 U/L    Protein, total 7.1 6.4 - 8.2 g/dL    Albumin 2.4 (L) 3.4 - 5.0 g/dL    Globulin 4.7 (H) 2.0 - 4.0 g/dL    A-G Ratio 0.5 (L) 0.8 - 1.7     PROTHROMBIN TIME + INR    Collection Time: 02/10/18  7:50 PM   Result Value Ref Range    Prothrombin time 20.7 (H) 11.5 - 15.2 sec    INR 1.9 (H) 0.8 - 1.2     PTT    Collection Time: 02/10/18  7:50 PM   Result Value Ref Range    aPTT 36.5 (H) 23.0 - 36.4 SEC   POC LACTIC ACID    Collection Time: 02/10/18  7:50 PM   Result Value Ref Range    Lactic Acid (POC) 2.7 (HH) 0.4 - 2.0 mmol/L   URINALYSIS W/ RFLX MICROSCOPIC    Collection Time: 02/10/18  8:45 PM   Result Value Ref Range    Color DARK YELLOW      Appearance CLEAR      Specific gravity 1.020 1.005 - 1.030      pH (UA) 7.0 5.0 - 8.0      Protein NEGATIVE  NEG mg/dL    Glucose 500 (A) NEG mg/dL    Ketone NEGATIVE  NEG mg/dL    Bilirubin SMALL (A) NEG      Blood NEGATIVE  NEG      Urobilinogen 4.0 (H) 0.2 - 1.0 EU/dL    Nitrites NEGATIVE  NEG      Leukocyte Esterase TRACE (A) NEG     INFLUENZA A & B AG (RAPID TEST)    Collection Time: 02/10/18  8:50 PM   Result Value Ref Range    Influenza A Antigen NEGATIVE  NEG      Influenza B Antigen NEGATIVE  NEG         Rad:2/10  CXR - enlarged cardiac borders  CT Abd-non specific

## 2018-02-11 NOTE — ED PROVIDER NOTES
[facility] EMERGENCY DEPT      7:52 PM    Date: 2/10/2018  Patient Name: Ambar Rajput    History of Presenting Illness     Chief Complaint   Patient presents with    Fever    Generalized Body Aches       History Provided By: Patient and Patient's Friend    Chief Complaint: Abdominal pain  Duration:  1 week  Timing:  Persisting  Location: Generalized  Quality: N/A  Severity: N/A  Modifying Factors: N/A  Associated Symptoms: Fever of 103, chills, myalgias, cough, diarrhea, vomiting, bleeding from mouth and nose.    56 y.o. female with noted past medical history of liver cirrhosis who presents to the emergency department generalized abdominal pain. The patient has associated symptoms of fever of 103, chills, myalgias, cough, diarrhea, vomiting, and bleeding from mouth and nose. The patient's symptoms have been persisting for 1 week. The patient's friend came to the patient's home and she vomited all of the fluids she had tried drinking. No other complaints. Nursing nurses regarding the HPI and triage nursing notes were reviewed. Prior medical records were reviewed.      Current Facility-Administered Medications   Medication Dose Route Frequency Provider Last Rate Last Dose    sodium chloride (NS) flush 5-10 mL  5-10 mL IntraVENous PRN Glo Ferreira MD        levoFLOXacin [facility]) 750 mg in D5W IVPB  750 mg IntraVENous Q24H Glo Ferreira  mL/hr at 02/10/18 2054 750 mg at 02/10/18 2054    piperacillin-tazobactam (ZOSYN) 4.5 g in 0.9% sodium chloride (MBP/ADV) 100 mL MBP  4.5 g IntraVENous Q6H Glo Ferreira MD   4.5 g at 02/10/18 2153    vancomycin (VANCOCIN) 2,000 mg in 0.9% sodium chloride 500 mL IVPB  2,000 mg IntraVENous ONCE Glo Ferreira  mL/hr at 02/10/18 2220 2,000 mg at 02/10/18 2220    PHARMACY INFORMATION NOTE  1 Each Other Rx Dosing/Monitoring Glo Ferreira MD        [START ON 2/11/2018] vancomycin (VANCOCIN) 1000 mg in  ml infusion  1,000 mg IntraVENous MD Kodi Palacois December ON 2/12/2018] VANCOMYCIN INFORMATION NOTE   Other ONCE Sierra Sanchez MD        morphine injection 4 mg  4 mg IntraVENous NOW Sierra Sanchez MD         Current Outpatient Prescriptions   Medication Sig Dispense Refill    traMADol (ULTRAM) 50 mg tablet Take 1 Tab by mouth every six (6) hours as needed for Pain. Max Daily Amount: 200 mg. 20 Tab 0    ondansetron (ZOFRAN ODT) 4 mg disintegrating tablet Take 1 Tab by mouth every eight (8) hours as needed for Nausea. 10 Tab 0    meclizine (ANTIVERT) 25 mg tablet Take  by mouth three (3) times daily as needed.  furosemide (LASIX) 20 mg tablet Take 1 Tab by mouth daily. 90 Tab 3    spironolactone (ALDACTONE) 50 mg tablet TAKE 1 TABLET BY MOUTH EVERY DAY 30 Tab 3    sertraline (ZOLOFT) 50 mg tablet TAKE 1 TABLET BY MOUTH EVERY DAY  1    metFORMIN (GLUCOPHAGE) 500 mg tablet Take 500 mg by mouth two (2) times daily (with meals).  lisinopril (PRINIVIL, ZESTRIL) 20 mg tablet Take 20 mg by mouth daily.  clotrimazole (LOTRIMIN) 1 % topical cream Apply a thin layer over affected area twice daily for up to 14 days. 15 g 2    gabapentin (NEURONTIN) 300 mg capsule Take 300 mg by mouth daily.          Past History     Past Medical History:  Past Medical History:   Diagnosis Date    Bilateral knee pain     [facility] ([facility]     Depression     Diabetes ([facility]     Diastolic hypertension     Fatty liver     Gastritis     Hypertension     Ill-defined condition     Inflammation of lymphatics     Liver disease     cirrhosis    Menorrhagia     HANKS (nonalcoholic steatohepatitis)     Nausea     Osteoarthritis     Right flank pain     RUQ abdominal pain     Sleep apnea     Does not use consistently    Thrombocytopenia (HCC)        Past Surgical History:  Past Surgical History:   Procedure Laterality Date    ENDOSCOPY, COLON, DIAGNOSTIC  5/20/2013    HX HYSTERECTOMY      HX OTHER SURGICAL liver biopsy    HX OVARIAN CYST REMOVAL      Bilateral       Family History:  History reviewed. No pertinent family history. Social History:  Social History   Substance Use Topics    Smoking status: Never Smoker    Smokeless tobacco: Never Used    Alcohol use No       Allergies:  No Known Allergies    Patient's primary care provider (as noted in EPIC):  Eneida Helm MD    Review of Systems     Review of Systems   Constitutional: Positive for chills and fever. Respiratory: Positive for cough. Gastrointestinal: Positive for abdominal pain, diarrhea and vomiting. Musculoskeletal: Positive for myalgias. All other systems reviewed and are negative. Physical Exam       Visit Vitals    /56    Pulse (!) 109    Temp (!) 102.9 °F (39.4 °C)    Resp 26    Ht 5' 4\" (1.626 m)    Wt 136.1 kg (300 lb)    SpO2 95%    BMI 51.49 kg/m2       Patient Vitals for the past 12 hrs:   Temp Pulse Resp BP SpO2   02/10/18 2200 - (!) 109 26 114/56 95 %   02/10/18 2145 - (!) 110 (!) 34 111/41 95 %   02/10/18 2130 - (!) 112 23 (!) 113/34 93 %   02/10/18 2115 - (!) 113 16 135/47 93 %   02/10/18 2100 - (!) 113 25 149/64 97 %   02/10/18 2047 - (!) 115 19 133/42 98 %   02/10/18 1941 - (!) 122 24 - 97 %   02/10/18 1940 - - - - 97 %   02/10/18 1939 - - - 141/49 -   02/10/18 1930 (!) 102.9 °F (39.4 °C) (!) 124 16 145/63 96 %       PHYSICAL EXAM:    CONSTITUTIONAL:  Alert, in no apparent distress;  well developed;  well nourished. HEAD:  Normocephalic, atraumatic. EYES:  EOMI. Non-icteric sclera. Normal conjunctiva. ENTM:  Nose:  no rhinorrhea. Throat:  no erythema or exudate, mucous membranes moist.  NECK:  No JVD. Supple  RESPIRATORY:  Chest clear, equal breath sounds, good air movement. CARDIOVASCULAR:  Regular rate and rhythm. No murmurs, rubs, or gallops. GI:  Normal bowel sounds, abdomen soft with diffuse abdominal TTP. Large rotund abdomen. No rebound or guarding.      BACK: Non-tender. UPPER EXT:  Normal inspection. LOWER EXT:  No edema, no calf tenderness. Distal pulses intact. NEURO:  Moves all four extremities, and grossly normal motor exam.  SKIN:  No rashes;  Normal for age. PSYCH:  Alert and normal affect. DIFFERENTIAL DIAGNOSES/ MEDICAL DECISION MAKING:  Gastritis, gerd, peptic ulcer disease, cholecystitis, pancreatitis, gastroenteritis, hepatitis, constipation related pain, appendicitis pain, diverticulitis, urinary tract infection, obstruction, abdominal wall pain, atypical cardiac (ami or anginal pain), referred pain from pulmonary process (pneumonia, empyema), or combination of the above versus many other processes. In female patients, also consider ectopic pregnancy, pregnancy related pain, ovarian cyst pain, ovarian torsion, pelvic inflammatory disease, other sources of gyn pain such as uterine fibroids, versus combination of the above and/or numerous other processes/ etiologies. Diagnostic Study Results     Abnormal lab results from this emergency department encounter:  Labs Reviewed   CBC WITH AUTOMATED DIFF - Abnormal; Notable for the following:        Result Value    RBC 3.22 (*)     HGB 11.6 (*)     HCT 33.9 (*)     .3 (*)     MCH 36.0 (*)     RDW 15.4 (*)     PLATELET 31 (*)     NEUTROPHILS 88 (*)     LYMPHOCYTES 4 (*)     ABS.  LYMPHOCYTES 0.2 (*)     All other components within normal limits   METABOLIC PANEL, COMPREHENSIVE - Abnormal; Notable for the following:     Glucose 205 (*)     BUN/Creatinine ratio 8 (*)     Calcium 8.1 (*)     Bilirubin, total 4.7 (*)     AST (SGOT) 45 (*)     Albumin 2.4 (*)     Globulin 4.7 (*)     A-G Ratio 0.5 (*)     All other components within normal limits   PROTHROMBIN TIME + INR - Abnormal; Notable for the following:     Prothrombin time 20.7 (*)     INR 1.9 (*)     All other components within normal limits   PTT - Abnormal; Notable for the following:     aPTT 36.5 (*)     All other components within normal limits   URINALYSIS W/ RFLX MICROSCOPIC - Abnormal; Notable for the following:     Glucose 500 (*)     Bilirubin SMALL (*)     Urobilinogen 4.0 (*)     Leukocyte Esterase TRACE (*)     All other components within normal limits   POC LACTIC ACID - Abnormal; Notable for the following:     Lactic Acid (POC) 2.7 (*)     All other components within normal limits   CULTURE, BLOOD   CULTURE, BLOOD   INFLUENZA A & B AG (RAPID TEST)   URINE MICROSCOPIC ONLY   URINALYSIS W/ REFLEX CULTURE       Lab values for this patient within approximately the last 12 hours:  Recent Results (from the past 12 hour(s))   EKG, 12 LEAD, INITIAL    Collection Time: 02/10/18  7:41 PM   Result Value Ref Range    Ventricular Rate 121 BPM    Atrial Rate 121 BPM    P-R Interval 144 ms    QRS Duration 96 ms    Q-T Interval 330 ms    QTC Calculation (Bezet) 468 ms    Calculated P Axis 53 degrees    Calculated R Axis -7 degrees    Calculated T Axis 83 degrees    Diagnosis       Sinus tachycardia  Cannot rule out Anterior infarct , age undetermined  Abnormal ECG  When compared with ECG of 20-AUG-2017 20:56,  Nonspecific T wave abnormality now evident in Lateral leads     CULTURE, BLOOD    Collection Time: 02/10/18  7:45 PM   Result Value Ref Range    Special Requests: PERIPHERAL      Culture result: PENDING    CULTURE, BLOOD    Collection Time: 02/10/18  7:45 PM   Result Value Ref Range    Special Requests: PERIPHERAL      Culture result: PENDING    CBC WITH AUTOMATED DIFF    Collection Time: 02/10/18  7:50 PM   Result Value Ref Range    WBC 5.6 4.6 - 13.2 K/uL    RBC 3.22 (L) 4.20 - 5.30 M/uL    HGB 11.6 (L) 12.0 - 16.0 g/dL    HCT 33.9 (L) 35.0 - 45.0 %    .3 (H) 74.0 - 97.0 FL    MCH 36.0 (H) 24.0 - 34.0 PG    MCHC 34.2 31.0 - 37.0 g/dL    RDW 15.4 (H) 11.6 - 14.5 %    PLATELET 31 (L) 575 - 420 K/uL    MPV 9.2 9.2 - 11.8 FL    NEUTROPHILS 88 (H) 40 - 73 %    LYMPHOCYTES 4 (L) 21 - 52 %    MONOCYTES 8 3 - 10 %    EOSINOPHILS 0 0 - 5 % BASOPHILS 0 0 - 2 %    ABS. NEUTROPHILS 4.9 1.8 - 8.0 K/UL    ABS. LYMPHOCYTES 0.2 (L) 0.9 - 3.6 K/UL    ABS. MONOCYTES 0.4 0.05 - 1.2 K/UL    ABS. EOSINOPHILS 0.0 0.0 - 0.4 K/UL    ABS. BASOPHILS 0.0 0.0 - 0.06 K/UL    DF AUTOMATED     METABOLIC PANEL, COMPREHENSIVE    Collection Time: 02/10/18  7:50 PM   Result Value Ref Range    Sodium 139 136 - 145 mmol/L    Potassium 3.8 3.5 - 5.5 mmol/L    Chloride 105 100 - 108 mmol/L    CO2 26 21 - 32 mmol/L    Anion gap 8 3.0 - 18 mmol/L    Glucose 205 (H) 74 - 99 mg/dL    BUN 7 7.0 - 18 MG/DL    Creatinine 0.88 0.6 - 1.3 MG/DL    BUN/Creatinine ratio 8 (L) 12 - 20      GFR est AA >60 >60 ml/min/1.73m2    GFR est non-AA >60 >60 ml/min/1.73m2    Calcium 8.1 (L) 8.5 - 10.1 MG/DL    Bilirubin, total 4.7 (H) 0.2 - 1.0 MG/DL    ALT (SGPT) 29 13 - 56 U/L    AST (SGOT) 45 (H) 15 - 37 U/L    Alk.  phosphatase 113 45 - 117 U/L    Protein, total 7.1 6.4 - 8.2 g/dL    Albumin 2.4 (L) 3.4 - 5.0 g/dL    Globulin 4.7 (H) 2.0 - 4.0 g/dL    A-G Ratio 0.5 (L) 0.8 - 1.7     PROTHROMBIN TIME + INR    Collection Time: 02/10/18  7:50 PM   Result Value Ref Range    Prothrombin time 20.7 (H) 11.5 - 15.2 sec    INR 1.9 (H) 0.8 - 1.2     PTT    Collection Time: 02/10/18  7:50 PM   Result Value Ref Range    aPTT 36.5 (H) 23.0 - 36.4 SEC   POC LACTIC ACID    Collection Time: 02/10/18  7:50 PM   Result Value Ref Range    Lactic Acid (POC) 2.7 (HH) 0.4 - 2.0 mmol/L   URINALYSIS W/ RFLX MICROSCOPIC    Collection Time: 02/10/18  8:45 PM   Result Value Ref Range    Color DARK YELLOW      Appearance CLEAR      Specific gravity 1.020 1.005 - 1.030      pH (UA) 7.0 5.0 - 8.0      Protein NEGATIVE  NEG mg/dL    Glucose 500 (A) NEG mg/dL    Ketone NEGATIVE  NEG mg/dL    Bilirubin SMALL (A) NEG      Blood NEGATIVE  NEG      Urobilinogen 4.0 (H) 0.2 - 1.0 EU/dL    Nitrites NEGATIVE  NEG      Leukocyte Esterase TRACE (A) NEG     INFLUENZA A & B AG (RAPID TEST)    Collection Time: 02/10/18  8:50 PM   Result Value Ref Range    Influenza A Antigen NEGATIVE  NEG      Influenza B Antigen NEGATIVE  NEG         Radiologist and cardiologist interpretations if available at time of this note:  No results found. CT ABD PELV WO CONT:  Findings:   - Mildly dilated bowel loop in the left mid abdomen, no clear or abrupt transition point. May reflect enteritis. Early partial obstruction no excluded. - Stable cirrhotic liver and similar sequela of portal hypertension including collateralization, esophageal varices and splenomegaly.   - Stable retroperitoneal adenopathy.   - Cholelithiasis without CT evident cholecystitis. - Anasarca. - Cardiomegaly. - Degenerative changes in the spine and hips. - No additional interval change from prior. Interpreted by ED Physician:  Cardiac Monitor Strip interpretation is Sinus Tachycardia about 120 bpm, No ST changes noted, NORMAL WIDTH QRS. 12 lead EKG interpreted by ED Physician is Sinus Tachycardia about 120 bpm, non-specific EKG.     Medication(s) ordered for patient during this emergency visit encounter:  Medications   sodium chloride (NS) flush 5-10 mL (not administered)   levoFLOXacin (LEVAQUIN) 750 mg in D5W IVPB (750 mg IntraVENous New Bag 2/10/18 2054)   piperacillin-tazobactam (ZOSYN) 4.5 g in 0.9% sodium chloride (MBP/ADV) 100 mL MBP (4.5 g IntraVENous New Bag 2/10/18 2153)   vancomycin (VANCOCIN) 2,000 mg in 0.9% sodium chloride 500 mL IVPB (2,000 mg IntraVENous New Bag 2/10/18 2220)   PHARMACY INFORMATION NOTE (not administered)   vancomycin (VANCOCIN) 1000 mg in  ml infusion (not administered)   VANCOMYCIN INFORMATION NOTE (not administered)   morphine injection 4 mg ( IntraVENous Canceled Entry 2/10/18 2049)   sodium chloride 0.9 % bolus infusion 4,083 mL (4,083 mL IntraVENous New Bag 2/10/18 1945)   HYDROmorphone (PF) (DILAUDID) injection 0.5 mg (0.5 mg IntraVENous Given 2/10/18 2102)   ondansetron (ZOFRAN) injection 4 mg (4 mg IntraVENous Given 2/10/18 2108) Medical Decision Making     I am the first provider for this patient. I reviewed the vital signs, available nursing notes, past medical history, past surgical history, family history and social history. Vital Signs:  Reviewed the patient's vital signs. ED COURSE AND MEDICAL DECISION MAKIN:52 PM  Patient is fever, tachycardic with uri sxs and abd pain. Concern is for sepsis. 7:53 PM - I suspect that this patient has an active infection. 7:53 PM - The patient met criteria for severe sepsis at this time. PROVIDER SEPSIS PHYSICAL EXAM EVAL  Vital signs reviewed (see nursing documentation for further details):  Vitals:    02/10/18 2115 02/10/18 2130 02/10/18 2145 02/10/18 2200   BP: 135/47 (!) 113/34 111/41 114/56   Pulse: (!) 113 (!) 112 (!) 110 (!) 109   Resp: 16 23 (!) 34 26   Temp:       SpO2: 93% 93% 95% 95%       Cardiac exam:Tachycardic    Pulmonary exam:Clear Lungs    Peripheral pulses:Normal    Capillary refill:Normal    Skin exam:pink    Exam performed Kelly Katz MD    SEP-1 Core Measure Exclusion Criteria  Elevated lactate due dorothy disease  Has the patient/family refused IV fluids? no    Critical Care Note:    Critical care minutes: 60 MINUTES. Given the patients underlying condition medical intervention(s) were needed, requiring numerous reevaluations of patient's vital signs and response to different emergency department therapies, total bedside time evaluating and/or treating the patient, not including procedures, is noted below. Admit to Hospitalist    The patient was presented to the accepting hospitalist, Dr. Britton. The patient's primary doctor is Jia Scanlon MD, and admissions for this physician are with the hospitalist.  If the patient has no primary doctor, then admission is to the hospitalist as well. As the emergency physician, I wrote courtesy admission orders for the hospitalist physician.   The courtesy orders included explicit instructions for the floor nursing staff to call the admitting attending physician upon patient arrival on the floor. Coding Diagnoses     Clinical Impression:   1. Abdominal pain, generalized    2. Fever, unspecified fever cause    3. Cough    4. Myalgia    5. Non-intractable vomiting with nausea, unspecified vomiting type    6. Diarrhea, unspecified type        Disposition     Disposition:  Admit. JOHANA Trujillo Board Certified Emergency Physician    Provider Attestation:  If a scribe was utilized in generation of this patient record, I personally performed the services described in the documentation, reviewed the documentation, as recorded by the scribe in my presence, and it accurately records the patient's history of presenting illness, review of systems, patient physical examination, and procedures performed by me as the attending physician. JOHANA Trujillo Board Certified Emergency Physician  2/10/2018.  7:53 PM    Scribe [address] [place] [address] acting as a scribe for and in the presence of Kobi Mckeon MD      February 10, 2018 at 10:34 PM       Provider Attestation:      I personally performed the services described in the documentation, reviewed the documentation, as recorded by the scribe in my presence, and it accurately and completely records my words and actions.  February 10, 2018 at 10:34 PM - Kobi Mckeon MD

## 2018-02-11 NOTE — PROGRESS NOTES
Problem: Falls - Risk of  Goal: *Absence of Falls  Document Behzad Fall Risk and appropriate interventions in the flowsheet.    Outcome: Progressing Towards Goal  Fall Risk Interventions:  Mobility Interventions: Patient to call before getting OOB         Medication Interventions: Patient to call before getting OOB    Elimination Interventions: Bed/chair exit alarm

## 2018-02-11 NOTE — PROGRESS NOTES
Internal Medicine Progress Note    Patient's Name: Sobia Nagy  Admit Date: 2/10/2018  Length of Stay: 1      Assessment/Plan     Active Hospital Problems    Diagnosis Date Noted    Sepsis ([facility] 02/10/2018    NAFLD (nonalcoholic fatty liver disease) 11/12/2017    Ascites 05/19/2017    Diabetes mellitus type 2, controlled ([facility] 01/31/2017    HTN (hypertension) 01/31/2017    Obesity 01/31/2017    Cirrhosis ([facility] 02/27/2014     - Cont IVFs  - Follow cultures  - De-escalate antibx if no source of bacterial infection by morning  - Cont Tamiflu for presumed flu x 5 days total  - Trend CBC  - Cont acceptable home medications for chronic conditions   - DVT protocol    I have personally reviewed all pertinent labs and films that have officially resulted over the last 24 hours. I have personally checked for all pending labs that are awaiting final results.     Subjective     Pt s/e @ bedside  No major events overnight  Afebrile  Feels a little better today  Denies CP or SOB    Objective     Visit Vitals    BP 90/42    Pulse 89    Temp 98.6 °F (37 °C)    Resp 24    Ht 5' 4\" (1.626 m)    Wt 136.1 kg (300 lb)    SpO2 96%    BMI 51.49 kg/m2       Physical Exam:  General Appearance: NAD, conversant  Lungs: CTA with normal respiratory effort  CV: RRR, no m/r/g  Abdomen: soft, non-tender, obese, normal bowel sounds  Extremities: no cyanosis, no peripheral edema  Neuro: No focal deficits, motor/sensory intact    Lab/Data Reviewed:  BMP:   Lab Results   Component Value Date/Time     02/11/2018 05:48 AM    K 4.0 02/11/2018 05:48 AM     02/11/2018 05:48 AM    CO2 23 02/11/2018 05:48 AM    AGAP 10 02/11/2018 05:48 AM     (H) 02/11/2018 05:48 AM    BUN 12 02/11/2018 05:48 AM    CREA 0.86 02/11/2018 05:48 AM    GFRAA >60 02/11/2018 05:48 AM    GFRNA >60 02/11/2018 05:48 AM     CBC:   Lab Results   Component Value Date/Time    WBC 11.6 02/11/2018 05:48 AM    HGB 10.3 (L) 02/11/2018 05:48 AM    HCT 30.6 (L) 02/11/2018 05:48 AM    PLT 33 (L) 02/11/2018 05:48 AM       Imaging Reviewed:  No results found.     Medications Reviewed:  Current Facility-Administered Medications   Medication Dose Route Frequency    oseltamivir (TAMIFLU) capsule 75 mg  75 mg Oral Q12H    influenza vaccine 2017-18 (3 yrs+)(PF) (FLUZONE QUAD/FLUARIX QUAD) injection 0.5 mL  0.5 mL IntraMUSCular PRIOR TO DISCHARGE    ibuprofen (MOTRIN) tablet 600 mg  600 mg Oral Q6H PRN    sodium chloride (NS) flush 5-10 mL  5-10 mL IntraVENous PRN    PHARMACY INFORMATION NOTE  1 Each Other Rx Dosing/Monitoring    cefTRIAXone (ROCEPHIN) 1 g in sterile water (preservative free) 10 mL IV syringe  1 g IntraVENous Q24H    gabapentin (NEURONTIN) capsule 300 mg  300 mg Oral DAILY    lactulose (CHRONULAC) solution 30 g  30 g Oral BID    ondansetron (ZOFRAN ODT) tablet 4 mg  4 mg Oral Q8H PRN    sertraline (ZOLOFT) tablet 50 mg  50 mg Oral DAILY    spironolactone (ALDACTONE) tablet 50 mg  50 mg Oral DAILY    traMADol (ULTRAM) tablet 50 mg  50 mg Oral Q6H PRN    0.9% sodium chloride infusion  75 mL/hr IntraVENous CONTINUOUS    zolpidem (AMBIEN) tablet 5 mg  5 mg Oral QHS PRN    insulin lispro (HUMALOG) injection   SubCUTAneous AC&HS    glucose chewable tablet 16 g  4 Tab Oral PRN    glucagon (GLUCAGEN) injection 1 mg  1 mg IntraMUSCular PRN    dextrose (D50W) injection syrg 12.5-25 g  25-50 mL IntraVENous PRN           James Roque DO  Internal Medicine, Hospitalist  Pager: [phone]  [address] [place] [address]

## 2018-02-11 NOTE — ED NOTES
Purposeful rounding completed:    Side rails up x 2:  YES  Bed in low position and wheels locked: YES  Call bell within reach: YES  Comfort addressed: YES    Toileting needs addressed: YES  Plan of care reviewed/updated with patient and or family members: YES  IV site assessed: YES  Pain assessed and addressed: YES,  Hospitalist at the bedside, patient prepared for transfer.

## 2018-02-11 NOTE — PROGRESS NOTES
Problem: Falls - Risk of  Goal: *Absence of Falls  Document Behzad Fall Risk and appropriate interventions in the flowsheet.    Outcome: Progressing Towards Goal  Fall Risk Interventions:  Mobility Interventions: Patient to call before getting OOB         Medication Interventions: Patient to call before getting OOB    Elimination Interventions: Call light in reach, Patient to call for help with toileting needs, Toileting schedule/hourly rounds

## 2018-02-11 NOTE — PROGRESS NOTES
0003: Spoke with pharmacy, stated they are not able to verify tamiflu and requested the medication be discontinued and then reordered    0007: paged Dr. Sarah Porras: spoke with Dr. Britton, informed doctor of pharmacy's request to have tamiflu order re-ordered    0018: Asked pharmacy to verify patients PRN medications    0115: pharmacy stated Tamiflu must be obtained through ED    0130: supervisor paged    0134: received critical lactic acid 2.4     0136: paged dr. Britton regarding lactic acid result    0139: Notified Dr. Britton of patient's lactic acid level, no orders received at this time    [phone]: Spoke with Dr. Britton, informed doctor that patient is currently sleeping but has stated that Ultram has not worked for her in the past and she prefers dilaudid. Patient stated pain was only relieved to a 7 from an 8. Doctor acknowledged information, no orders given at this time    [phone]: Patient stated pain in head woke her from sleep, paged dr. Jonathan Murray: patient states her headache is getting \"much better\" still rates pain of 7/10    0458: Patient requesting sleep aid, Lory Rothman given    8643: Due to lack of sleep, patient requested shift change report be given outside room. Verbal report given to [facility] RN (oncoming nurse) by Desi Kendall RN (offgoing nurse). Report included the following information SBAR, Kardex and MAR.

## 2018-02-11 NOTE — ACP (ADVANCE CARE PLANNING)
Patient has designated ___daughter_____________________ to participate in his/her discharge plan and to receive any needed information.      Name: Bud Candelario  [address] [place] [address], [place]  Phone number:[phone]

## 2018-02-11 NOTE — ED NOTES
Purposeful rounding completed:    Side rails up x 2:  YES  Bed in low position and wheels locked: YES  Call bell within reach: YES  Comfort addressed: YES    Toileting needs addressed: YES  Plan of care reviewed/updated with patient and or family members: YES  IV site assessed: YES  Pain assessed and addressed: YES  Family at the bedside

## 2018-02-11 NOTE — ED NOTES
The Sepsis Screening has been completed on arrival in the Emergency Department.     Vital signs:  Patient Vitals for the past 4 hrs:   Temp Pulse Resp BP SpO2   02/10/18 1930 (!) 102.9 °F (39.4 °C) (!) 124 16 145/63 96 %            =monitored (data validate)  MAP (Calculated): 90    =calculated (manual entry)    Is patient is 31y/o or older, meets 2 or more ABNORMAL VITAL SIGNS below, associated with SUSPECTED INFECTION?  YES     Temperature < 96.8°F (36°C) or > 100.9°F (38.3°C)   Heart Rate > 90 beats per minute   Respiratory Rate > 20 beats per minute   BP < 90 systolic    MAP < 65    IF ANSWER IS YES, CALL A CODE SEPSIS  POC LACTIC ACID ASAP AND BEGIN NURSE DRIVEN SEPSIS ORDERS WHILE AWAITING PROVIDER

## 2018-02-11 NOTE — PROGRESS NOTES
Patient received from ED via stretcher. Patient AAOX4, calm and cooperative. Family at bedside. Will notify primary nurse. Patient resting comfortably.

## 2018-02-12 PROBLEM — N17.9 ACUTE RENAL FAILURE (ARF) (HCC): Status: ACTIVE | Noted: 2018-02-12

## 2018-02-12 LAB
ANION GAP SERPL CALC-SCNC: 8 MMOL/L (ref 3–18)
BACTERIA SPEC CULT: NORMAL
BUN SERPL-MCNC: 23 MG/DL (ref 7–18)
BUN/CREAT SERPL: 15 (ref 12–20)
CALCIUM SERPL-MCNC: 7.9 MG/DL (ref 8.5–10.1)
CHLORIDE SERPL-SCNC: 109 MMOL/L (ref 100–108)
CO2 SERPL-SCNC: 23 MMOL/L (ref 21–32)
CREAT SERPL-MCNC: 1.56 MG/DL (ref 0.6–1.3)
ERYTHROCYTE [DISTWIDTH] IN BLOOD BY AUTOMATED COUNT: 16.4 % (ref 11.6–14.5)
GLUCOSE BLD STRIP.AUTO-MCNC: 136 MG/DL (ref 70–110)
GLUCOSE BLD STRIP.AUTO-MCNC: 144 MG/DL (ref 70–110)
GLUCOSE BLD STRIP.AUTO-MCNC: 154 MG/DL (ref 70–110)
GLUCOSE BLD STRIP.AUTO-MCNC: 90 MG/DL (ref 70–110)
GLUCOSE SERPL-MCNC: 111 MG/DL (ref 74–99)
HCT VFR BLD AUTO: 30.2 % (ref 35–45)
HGB BLD-MCNC: 10.2 G/DL (ref 12–16)
MCH RBC QN AUTO: 35.9 PG (ref 24–34)
MCHC RBC AUTO-ENTMCNC: 33.8 G/DL (ref 31–37)
MCV RBC AUTO: 106.3 FL (ref 74–97)
PLATELET # BLD AUTO: 37 K/UL (ref 135–420)
PMV BLD AUTO: 9.9 FL (ref 9.2–11.8)
POTASSIUM SERPL-SCNC: 4 MMOL/L (ref 3.5–5.5)
RBC # BLD AUTO: 2.84 M/UL (ref 4.2–5.3)
SERVICE CMNT-IMP: NORMAL
SODIUM SERPL-SCNC: 140 MMOL/L (ref 136–145)
WBC # BLD AUTO: 11.6 K/UL (ref 4.6–13.2)

## 2018-02-12 PROCEDURE — 80048 BASIC METABOLIC PNL TOTAL CA: CPT | Performed by: INTERNAL MEDICINE

## 2018-02-12 PROCEDURE — 97530 THERAPEUTIC ACTIVITIES: CPT

## 2018-02-12 PROCEDURE — 85027 COMPLETE CBC AUTOMATED: CPT | Performed by: INTERNAL MEDICINE

## 2018-02-12 PROCEDURE — 74011250637 HC RX REV CODE- 250/637: Performed by: INTERNAL MEDICINE

## 2018-02-12 PROCEDURE — 74011250636 HC RX REV CODE- 250/636: Performed by: HOSPITALIST

## 2018-02-12 PROCEDURE — 36415 COLL VENOUS BLD VENIPUNCTURE: CPT | Performed by: INTERNAL MEDICINE

## 2018-02-12 PROCEDURE — 74011636637 HC RX REV CODE- 636/637: Performed by: INTERNAL MEDICINE

## 2018-02-12 PROCEDURE — 65270000029 HC RM PRIVATE

## 2018-02-12 PROCEDURE — 82962 GLUCOSE BLOOD TEST: CPT

## 2018-02-12 PROCEDURE — 77030020263 HC SOL INJ SOD CL0.9% LFCR 1000ML

## 2018-02-12 PROCEDURE — 97162 PT EVAL MOD COMPLEX 30 MIN: CPT

## 2018-02-12 PROCEDURE — 97166 OT EVAL MOD COMPLEX 45 MIN: CPT

## 2018-02-12 RX ORDER — SODIUM CHLORIDE 9 MG/ML
100 INJECTION, SOLUTION INTRAVENOUS CONTINUOUS
Status: DISPENSED | OUTPATIENT
Start: 2018-02-12 | End: 2018-02-13

## 2018-02-12 RX ADMIN — IBUPROFEN 600 MG: 600 TABLET, FILM COATED ORAL at 09:32

## 2018-02-12 RX ADMIN — IBUPROFEN 600 MG: 600 TABLET, FILM COATED ORAL at 22:25

## 2018-02-12 RX ADMIN — SODIUM CHLORIDE 100 ML/HR: 900 INJECTION, SOLUTION INTRAVENOUS at 08:54

## 2018-02-12 RX ADMIN — SERTRALINE HYDROCHLORIDE 50 MG: 50 TABLET ORAL at 09:32

## 2018-02-12 RX ADMIN — Medication 10 ML: at 09:33

## 2018-02-12 RX ADMIN — INSULIN LISPRO 2 UNITS: 100 INJECTION, SOLUTION INTRAVENOUS; SUBCUTANEOUS at 18:09

## 2018-02-12 RX ADMIN — ONDANSETRON 4 MG: 4 TABLET, ORALLY DISINTEGRATING ORAL at 18:42

## 2018-02-12 RX ADMIN — IBUPROFEN 600 MG: 600 TABLET, FILM COATED ORAL at 15:41

## 2018-02-12 RX ADMIN — GABAPENTIN 300 MG: 300 CAPSULE ORAL at 09:32

## 2018-02-12 RX ADMIN — SODIUM CHLORIDE 100 ML/HR: 900 INJECTION, SOLUTION INTRAVENOUS at 18:15

## 2018-02-12 RX ADMIN — TRAMADOL HYDROCHLORIDE 50 MG: 50 TABLET, FILM COATED ORAL at 11:38

## 2018-02-12 RX ADMIN — ZOLPIDEM TARTRATE 5 MG: 5 TABLET ORAL at 22:25

## 2018-02-12 RX ADMIN — OSELTAMIVIR PHOSPHATE 75 MG: 75 CAPSULE ORAL at 02:26

## 2018-02-12 RX ADMIN — SPIRONOLACTONE 50 MG: 25 TABLET, FILM COATED ORAL at 10:19

## 2018-02-12 RX ADMIN — LACTULOSE 30 G: 20 SOLUTION ORAL at 17:52

## 2018-02-12 NOTE — PROGRESS NOTES
Patient received in bed awake. Patient A&Ox4, denies pain and discomfort. No distress noted. Frequently used items within reach. Bed locked in low position. Call bell within reach and patient verbalized understanding of use for assistance and needs. 1016 -- Rashida Escobar, BONNIE (Preceptor) spoke with Dr. Jorge Gramajo. She informed him of patient's morning  prior to administering scheduled Aldactone 50 mg and that the patient has been refusing her scheduled lactulose. Per Dr. Jorge Gramajo, [facility] to give Aldactone 50 mg.      1334 -- During IDR's with Monse Weber NP, this nurse and Rashida Escobar RN (Preceptor) received verbal orders to discontinue isolation and tamiflu (RBV). 1722 -- Paged Dr. Jorge Gramajo to update him on patient's condition throughout the day. Awaiting callback. 1745 -- Patient has been refusing scheduled lactulose doses and has been noticeably falling asleep on and off during conversations with this nurse throughout the shift. Last ammonia level was 96 drawn 2/11 in the morning. Per Dr. Jorge Gramajo, patient must continue taking the lactulose. Educated patient on importance of taking the lactulose. Patient verbalized understanding and agreed to take evening dose.

## 2018-02-12 NOTE — PROGRESS NOTES
Nutrition initial assessment/Plan of care      RECOMMENDATIONS:   1. Consistent CHO Diet  2. Monitor weight, labs and PO intake  3. RD to follow     GOALS:   1. PO intake meets >75% of protein/calorie needs by 2/15  2. Weight Maintenance (+/- 1-2 lb) by  2/19     ASSESSMENT:   Wt status classified as obese per BMI of 54.7. Adequate PO intake. Labs noted. BG range () over the past 24 hours. Elevated BUN/Cr; GFR (34). Nutrition recommendations listed. RD to follow. Nutrition Diagnoses:   Inadequate PO intake related to poor appeitie as evidenced by <25% of meals consumed  X 4 days. Nutrition Risk:  [x] High  [] Moderate []  Low    SUBJECTIVE/OBJECTIVE:    Pt admitted for sepsis. PMHx including liver cirrhosis, morbid obesity, DM, HTN. Pt seen in room in [address] [place] [address] in chair and very drowsy. Denies having any food allergies or problems chewing/swallowing.  lb but wt fluctuates d/t fluid. Reports having nausea and a poor appetite; has not consumed much at all over the past 3-4 days. Stated she ate a sandwich yesterday, but that was all. Does not want to try supplements. Encouraged intake of fluids and suggested at least trying to include soup for now. Will monitor. Information Obtained from:    [x] Chart Review   [x] Patient   [] Family/Caregiver   [] Nurse/Physician   [] Interdisciplinary Meeting/Rounds      Diet:  Consistent CHO 5263-8387 2g Na Diet  Medications: [x] Reviewed  NS @100 mL/hr  Aldactone 50 mg/d   Allergies: [x] Reviewed   Encounter Diagnoses     ICD-10-CM ICD-9-CM   1. Abdominal pain, generalized R10.84 789.07   2. Fever, unspecified fever cause R50.9 780.60   3. Cough R05 786.2   4. Myalgia M79.1 729.1   5. Non-intractable vomiting with nausea, unspecified vomiting type R11.2 787.01   6.  Diarrhea, unspecified type R19.7 787.91     Past Medical History:   Diagnosis Date    Bilateral knee pain     [facility]     Depression     Diabetes [facility]     Diastolic hypertension  Fatty liver     Gastritis     Hypertension     Ill-defined condition     Inflammation of lymphatics     Liver disease     cirrhosis    Menorrhagia     HANKS (nonalcoholic steatohepatitis)     Nausea     Osteoarthritis     Right flank pain     RUQ abdominal pain     Sleep apnea     Does not use consistently    Thrombocytopenia (HCC)       Labs:    Lab Results   Component Value Date/Time    Sodium 140 02/12/2018 06:34 AM    Potassium 4.0 02/12/2018 06:34 AM    Chloride 109 (H) 02/12/2018 06:34 AM    CO2 23 02/12/2018 06:34 AM    Anion gap 8 02/12/2018 06:34 AM    Glucose 111 (H) 02/12/2018 06:34 AM    BUN 23 (H) 02/12/2018 06:34 AM    Creatinine 1.56 (H) 02/12/2018 06:34 AM    Calcium 7.9 (L) 02/12/2018 06:34 AM    Magnesium 1.6 09/09/2017 06:27 AM    Albumin 2.4 (L) 02/10/2018 07:50 PM     Anthropometrics: BMI (calculated): 54.7  Last 3 Recorded Weights in this Encounter    02/10/18 1930 02/11/18 2237   Weight: 136.1 kg (300 lb) 144.7 kg (319 lb)      Ht Readings from Last 1 Encounters:   02/10/18 5' 4\" (1.626 m)     Weight Metrics 2/11/2018 12/27/2017 11/8/2017 9/9/2017 8/20/2017 8/9/2017 6/12/2017   Weight 319 lb 316 lb 295 lb 274 lb 285 lb 285 lb 308 lb   BMI 54.76 kg/m2 54.24 kg/m2 50.64 kg/m2 47.03 kg/m2 48.92 kg/m2 48.92 kg/m2 52.87 kg/m2       Patient Vitals for the past 100 hrs:   % Diet Eaten   02/12/18 1312 0 %       IBW: 120 lb %IBW: 266% UBW: 300 lb   [] Weight Loss [x] Weight Gain [] Weight Stable    Estimated Nutrition Needs: [x] MSJ RMR: 2964 Kcal  Calories: 2964 kcal Based on:   [x] Actual BW    Protein:   110g Based on:   [x] Actual BW    Fluid:       3000 ml Based on:   [x] Actual BW      [x] No Cultural, Yarsanism or ethnic dietary need identified.     [] Cultural, Yarsanism and ethnic food preferences identified and addressed     Wt Status:  [] Normal (18.6 - 24.9) [] Underweight (<18.5) [] Overweight (25 - 29.9) [] Mild Obesity (30 - 34.9)  [] Moderate Obesity (35 - 39.9) [x] Morbid Obesity (40+)       Nutrition Problems Identified:   [] Suboptimal PO intake   [] Food Allergies  [] Difficulty chewing/swallowing/poor dentition  [] Constipation/Diarrhea   [] Nausea/Vomiting   [] None  [] Other:     Plan:   [x] Therapeutic Diet  [x]  Obtained/adjusted food preferences/tolerances and/or snacks options   []  Supplements added   [] Occupational therapy following for feeding techniques  []  HS snack added   []  Modify diet texture   []  Modify diet for food allergies   []  Educate patient   []  Assist with menu selection   [x]  Monitor PO intake on meal rounds   [x]  Continue inpatient monitoring and intervention   []  Participated in discharge planning/Interdisciplinary rounds/Team meetings   []  Other:     Education Needs:   [] Not appropriate for teaching at this time due to:   [x] Identified and addressed    Nutrition Monitoring and Evaluation:  [x] Continue ongoing monitoring and intervention  [] Other    Tom Anthony

## 2018-02-12 NOTE — PROGRESS NOTES
Internal Medicine Progress Note    Patient's Name: Rober Anderson  Admit Date: 2/10/2018  Length of Stay: 2      Assessment/Plan     Active Hospital Problems    Diagnosis Date Noted    Acute renal failure (ARF) ([facility] 02/12/2018    Sepsis ([facility] 02/10/2018    NAFLD (nonalcoholic fatty liver disease) 11/12/2017    Ascites 05/19/2017    Diabetes mellitus type 2, controlled ([facility] 01/31/2017    HTN (hypertension) 01/31/2017    Obesity 01/31/2017    Cirrhosis ([facility] 02/27/2014   acute or subacute hepatic encephalopathy  Sepsis likely viral, suspect flu    - Cr bump overnight  - Will increase IVFs  - Trend BMP  - Cultures NGTD, bacterial infection unlikely, will d/c antibx  - Cont Tamiflu 3/5  - Trend CBC  - Likely OK for d/c in AM if stable and renal fxn improved  - Cont acceptable home medications for chronic conditions   - DVT protocol    I have personally reviewed all pertinent labs and films that have officially resulted over the last 24 hours. I have personally checked for all pending labs that are awaiting final results.     Subjective     Pt s/e @ bedside  No major events overnight  Afebrile overnight  States she is feeling better  Denies CP or SOB    Objective     Visit Vitals    BP 96/55 (BP 1 Location: Right arm, BP Patient Position: At rest)    Pulse 88    Temp 97.4 °F (36.3 °C)    Resp 18    Ht 5' 4\" (1.626 m)    Wt 144.7 kg (319 lb)    SpO2 96%    BMI 54.76 kg/m2       Physical Exam:  General Appearance: NAD, conversant  Lungs: CTA with normal respiratory effort  CV: RRR, no m/r/g  Abdomen: soft, non-tender, obese, normal bowel sounds  Extremities: no cyanosis, no peripheral edema  Neuro: No focal deficits, motor/sensory intact    Lab/Data Reviewed:  BMP:   Lab Results   Component Value Date/Time     02/12/2018 06:34 AM    K 4.0 02/12/2018 06:34 AM     (H) 02/12/2018 06:34 AM    CO2 23 02/12/2018 06:34 AM    AGAP 8 02/12/2018 06:34 AM     (H) 02/12/2018 06:34 AM    BUN 23 (H) 02/12/2018 06:34 AM    CREA 1.56 (H) 02/12/2018 06:34 AM    GFRAA 42 (L) 02/12/2018 06:34 AM    GFRNA 34 (L) 02/12/2018 06:34 AM     CBC:   Lab Results   Component Value Date/Time    WBC 11.6 02/12/2018 06:34 AM    HGB 10.2 (L) 02/12/2018 06:34 AM    HCT 30.2 (L) 02/12/2018 06:34 AM    PLT 37 (L) 02/12/2018 06:34 AM       Imaging Reviewed:  No results found.     Medications Reviewed:  Current Facility-Administered Medications   Medication Dose Route Frequency    0.9% sodium chloride infusion  100 mL/hr IntraVENous CONTINUOUS    oseltamivir (TAMIFLU) capsule 75 mg  75 mg Oral Q12H    influenza vaccine 2017-18 (3 yrs+)(PF) (FLUZONE QUAD/FLUARIX QUAD) injection 0.5 mL  0.5 mL IntraMUSCular PRIOR TO DISCHARGE    ibuprofen (MOTRIN) tablet 600 mg  600 mg Oral Q6H PRN    sodium chloride (NS) flush 5-10 mL  5-10 mL IntraVENous PRN    PHARMACY INFORMATION NOTE  1 Each Other Rx Dosing/Monitoring    gabapentin (NEURONTIN) capsule 300 mg  300 mg Oral DAILY    lactulose (CHRONULAC) solution 30 g  30 g Oral BID    ondansetron (ZOFRAN ODT) tablet 4 mg  4 mg Oral Q8H PRN    sertraline (ZOLOFT) tablet 50 mg  50 mg Oral DAILY    spironolactone (ALDACTONE) tablet 50 mg  50 mg Oral DAILY    traMADol (ULTRAM) tablet 50 mg  50 mg Oral Q6H PRN    zolpidem (AMBIEN) tablet 5 mg  5 mg Oral QHS PRN    insulin lispro (HUMALOG) injection   SubCUTAneous AC&HS    glucose chewable tablet 16 g  4 Tab Oral PRN    glucagon (GLUCAGEN) injection 1 mg  1 mg IntraMUSCular PRN    dextrose (D50W) injection syrg 12.5-25 g  25-50 mL IntraVENous PRN           Marla Yanes DO  Internal Medicine, Hospitalist  Pager: [phone]  [address] [place] [address]

## 2018-02-12 NOTE — PROGRESS NOTES
Bedside and Verbal shift change report given to Bessie Cotto RN (oncoming nurse) by Tiffany Marin RN (offgoing nurse). Report included the following information SBAR, Kardex and MAR. Assessment completed. Patient AAOX4, calm and cooperative. Family at bedside. Bed locked and at lowest position. Call bell within reach. Patient resting comfortably in bed.    0725-Bedside and Verbal shift change report given to Dagmar Urbina RN (oncoming nurse) by Bessie Cotto RN (offgoing nurse). Report included the following information SBAR, Kardex and MAR. Patient resting comfortably.

## 2018-02-12 NOTE — ROUTINE PROCESS
Bedside and Verbal shift change report given to ALFRED WEIR RN (oshncoming nurse) by Jenna Bentley RN (offgoing nurse). Report included the folowing information SBAR, Kardex, Intake/Output, MAR and Recent Results.

## 2018-02-12 NOTE — PROGRESS NOTES
Problem: Falls - Risk of  Goal: *Absence of Falls  Document Behzad Fall Risk and appropriate interventions in the flowsheet.    Outcome: Progressing Towards Goal  Fall Risk Interventions:  Mobility Interventions: Patient to call before getting OOB         Medication Interventions: Patient to call before getting OOB, Evaluate medications/consider consulting pharmacy    Elimination Interventions: Call light in reach, Toilet paper/wipes in reach, Patient to call for help with toileting needs

## 2018-02-12 NOTE — INTERDISCIPLINARY ROUNDS
IDR Summary      Patient: Hugo Hernandez MRN: [id number]    Age: 56 y.o.  : 1960     Admit Diagnosis: Sepsis ([address]         DIET status:  Diabetic Diet     Lines/Tubes:   IV: YES   Needed: YES  Reyes: NO  Needed:NO  Central Line: NO Needed: NO      VTE Prophylaxis: Chemical    Mobility needs: Yes     PT ordered:  NO  PT eval on chart: NO    OT ordered:  NO OT eval on chart: NO      ST ordered:  NO ST eval on chart:  NO     Disposition/Care Management:  Discharge plan: Kirsten Mirza ordered? NO     Recommended DME from PT/OT:      DME ordered? NO     SNF- has patient been matched? NO    Accepting bed? NO   Does patient require insurance auth?   NO       Barriers to discharge:   Financial concerns:No   PCP: Steve Prajapati MD    : NO  Interventions:       LOS: 2 days     Expected days until discharge:  1-2 days            Signed:     ADELE GaloP-BC  [address]  Hospitalist Division  Pager:  [phone]  Office:  [phone]

## 2018-02-12 NOTE — CDMP QUERY
Please clarify that:    =>Sepsis has been ruled out and if ruled out please remove from the active problem list  =>Other Explanation of clinical findings  =>Unable to Determine (no explanation of clinical findings)    The medical record reflects the following:    Risk: 55 yo female admitted with fever, tachycardia    Clinical Indicators: Per H&P  Fever/Tachy Sepsis rule out - RIDT neg. given Vanc+Zosyn+Levaquin in ERx1, no WBC count. Gentle fluids. Per PN 2/11  De-escalate antibx if no source of bacterial infection by morning  - Cont Tamiflu for presumed flu x 5 days total  Per PN 2/12  Cultures NGTD, bacterial infection unlikely, will d/c antibx - Cont Tamiflu 3/5    Treatment: Tamiflu, IV fluids, serial  labs, blood cultures    Please clarify and document your clinical opinion in the progress notes and discharge summary including the definitive and/or presumptive diagnosis, (suspected or probable), related to the above clinical findings. Please include clinical findings supporting your diagnosis. If you DECLINE this query or would like to communicate with [facility], please utilize the \"[facility] message box\" at the TOP of the Progress Note on the right.       Thank you,  Natalie Genao -[phone]

## 2018-02-12 NOTE — CDMP QUERY
Please clarify if this patient is being treated/managed for:    =>acute or subacute hepatic encephalopathy  =>Other Explanation of clinical findings  =>Unable to Determine (no explanation of clinical findings)    The medical record reflects the following:    Risk: 55 yo female with PMH hepatic cirrhosis due to HANKS 2011    Clinical Indicators: Per H&P  Hepatic encephalopathy has recently developed 11/2017 on lactulose  ammonia level 96 on adm  ascites  thrombocytopenia  small varices by EGD from 1/2017    Treatment: lactulose, serial labs, nsg. observation    Please clarify and document your clinical opinion in the progress notes and discharge summary including the definitive and/or presumptive diagnosis, (suspected or probable), related to the above clinical findings. Please include clinical findings supporting your diagnosis. If you DECLINE this query or would like to communicate with [facility], please utilize the \"[employer] message box\" at the TOP of the Progress Note on the right.       Thank you,  Charity Khalil RN [facility]  [phone]

## 2018-02-12 NOTE — PROGRESS NOTES
Problem: Mobility Impaired (Adult and Pediatric)  Goal: *Acute Goals and Plan of Care (Insert Text)  Physical Therapy Goals  Initiated 2/12/2018 and to be accomplished within 7 day(s)  1. Patient will move from supine to sit and sit to supine , scoot up and down and roll side to side in bed with modified independence. 2.  Patient will transfer from bed to chair and chair to bed with modified independence using the least restrictive device. 3.  Patient will perform sit to stand with modified independence. 4.  Patient will ambulate with modified independence for 300 feet with the least restrictive device. 5.  Patient will ascend/descend 3 stairs with  handrail(s) with modified independence. Outcome: Progressing Towards Goal  physical Therapy EVALUATION    Patient: Farshad Paredes (26 y.o. female)  Date: 2/12/2018  Primary Diagnosis: Sepsis [facility])        Precautions:   Fall    PROBLEM LIST:  Patient presents with the following problems:   Bed Mobility, Transfers, Gait, Strength, Balance, Stairs and Precautions  ASSESSMENT :   Patient requires between minimal assistance/contact guard assist and supervision/set-up for bed mobility, transfers and ambulation. Patient reports pain in liver and headache of 6/10 pre and post.   Left in chair   Education safety  Plan of care and ankle pumps needs reinforcement. Patient will benefit from skilled intervention to address the above impairments.   Patients rehabilitation potential is considered to be Fair  Factors which may influence rehabilitation potential include:   []         None noted  []         Mental ability/status  [x]         Medical condition  []         Home/family situation and support systems  []         Safety awareness  []         Pain tolerance/management  []         Other:      PLAN :  Recommendations and Planned Interventions:  [x]           Bed Mobility Training             [x]    Neuromuscular Re-Education  [x]           Transfer Training []    Orthotic/Prosthetic Training  [x]           Gait Training                          []    Modalities  [x]           Therapeutic Exercises          []    Edema Management/Control  [x]           Therapeutic Activities            [x]    Patient and Family Training/Education  []           Other (comment):    Frequency/Duration: Patient will be followed by physical therapy 3-5times a week to address goals. Discharge Recommendations: Home Health  Further Equipment Recommendations for Discharge: straight cane ? SUBJECTIVE:   Patient stated .    OBJECTIVE DATA SUMMARY:     Past Medical History:   Diagnosis Date    Bilateral knee pain     [facility] [address]     Depression     Diabetes ([facility] [address] [facility]     Diastolic hypertension     Fatty liver     Gastritis     Hypertension     Ill-defined condition     Inflammation of lymphatics     Liver disease     cirrhosis    Menorrhagia     HANKS (nonalcoholic steatohepatitis)     Nausea     Osteoarthritis     Right flank pain     RUQ abdominal pain     Sleep apnea     Does not use consistently    Thrombocytopenia (HCC)      Past Surgical History:   Procedure Laterality Date    ENDOSCOPY, COLON, DIAGNOSTIC  5/20/2013    HX HYSTERECTOMY      HX OTHER SURGICAL      liver biopsy    HX OVARIAN CYST REMOVAL      Bilateral     Barriers to Learning/Limitations: yes;  physical  Compensate with: visual, verbal, tactile, kinesthetic cues/model    G CODES:Mobility  Current  CJ= 20-39%   Goal  CI= 1-19%. The severity rating is based on the Other [employer] Balance Scale3+/5   [employer] Balance Scale3+/5  0: Pt performs 25% or less of standing activity (Max assist) CN, 100% impaired. 1: Pt supports self with upper extremities but requires therapist assistance. Pt performs 25-50% of effort (Mod assist) CM, 80% to <100% impaired. 1+: Pt supports self with upper extremities but requires therapist assistance.  Pt performs >50% effort. (Min assist). CL, 60% to <80% impaired. 2: Pt supports self independently with both upper extremities (walker, crutches, parallel bars). CL, 60% to <80% impaired. 2+: Pt support self independently with 1 upper extremity (cane, crutch, 1 parallel bar). CK, 40% to <60% impaired. 3: Pt stands without upper extremity support for up to 30 seconds. CK, 40% to <60% impaired. 3+: Pt stands without upper extremity support for 30 seconds or greater. CJ, 20% to <40% impaired. 4: Pt independently moves and returns center of gravity 1-2 inches in one plane. CJ, 20% to <40% impaired. 4+: Pt independently moves and returns center of gravity 1-2 inches in multiple planes. CI, 1% to <20% impaired. 5: Pt independently moves and returns center of gravity in all planes greater than 2 inches. CH, 0% impaired. Eval Complexity: History: MEDIUM  Complexity : 1-2 comorbidities / personal factors will impact the outcome/ POC Exam:MEDIUM Complexity : 3 Standardized tests and measures addressing body structure, function, activity limitation and / or participation in recreation  Presentation: MEDIUM Complexity : Evolving with changing characteristics  Clinical Decision Making:Medium Complexity [facility] Standing Balance Scale3+/5 Overall Complexity:MEDIUM    Prior Level of Function/Home Situation: I with adl's and ambulation  Without assistive device.    Home Situation  Home Environment: Apartment  # Steps to Enter: 3  Rails to Enter: Yes  Hand Rails : Bilateral  One/Two Story Residence: One story  Living Alone: No  Support Systems: Child(jailyn)  Patient Expects to be Discharged to[de-identified] Apartment  Current DME Used/Available at Home: Wheelchair, Shower chair  Tub or Shower Type: Tub/Shower combination (has shower chair)  Critical Behavior:  Neurologic State: Alert  Orientation Level: Oriented X4  Cognition: Follows commands  Safety/Judgement: Fall prevention  Psychosocial  Patient Behaviors: Calm;Cooperative  Purposeful Interaction: Yes  Pt Identified Daily Priority: Clinical issues (comment)  Caritas Process: Establish trust;Teaching/learning; Attend basic human needs;Create healing environment;Supportive expression  Caring Interventions: Therapeutic modalities  Reassure: Therapeutic listening;Caring rounds  Therapeutic Modalities: Intentional therapeutic touch  Skin Condition/Temp: Warm;Dry  Skin Integrity: Intact (bruises)  Skin Integumentary  Skin Color: Appropriate for ethnicity  Skin Condition/Temp: Warm;Dry  Skin Integrity: Intact (bruises)  Turgor: Non-tenting  Hair Growth: Present  Strength:    Strength: Generally decreased, functional (3=/5 both legs )  Range Of Motion:  AROM: Generally decreased, functional (decreaed hip ext in standing)  Functional Mobility:  Bed Mobility:  Rolling:  (up in cahir )  Supine to Sit:  (pt sitting at EOB on arrival)  Sit to Supine:  (not asssessed; pt left up in chair)  Transfers:  Sit to Stand: Contact guard assistance;Stand-by asssistance; Additional time  Stand to Sit: Contact guard assistance;Stand-by asssistance; Additional time        Balance:   Sitting: Impaired  Sitting - Static: Good (unsupported)  Sitting - Dynamic: Fair (occasional)  Standing: Impaired  Standing - Static: Fair  Standing - Dynamic : Fair  Ambulation/Gait Training:  Distance (ft): 20 Feet (ft) (x2)  Assistive Device: Walker, rolling  Ambulation - Level of Assistance: Contact guard assistance;Stand-by asssistance  Gait Description (WDL): Exceptions to WDL  Gait Abnormalities: Decreased step clearance;Shuffling gait; Step to gait  Base of Support: Center of gravity altered  Speed/Lety: Delayed  Step Length: Right shortened;Left shortened  Interventions: Safety awareness training;Verbal cues; Visual/Demos  Therapeutic Exercises:    Ankle pumps   Pain:  Pre treatment pain level:6  Post treatment pain level:6  Pain Scale 1: Numeric (0 - 10)  Pain Intensity 1: 6  Pain Location 1: Head  Pain Orientation 1: Posterior  Pain Description 1: Aching  Pain Intervention(s) 1: Medication (see MAR)  Activity Tolerance:   Fair   Please refer to the flowsheet for vital signs taken during this treatment. After treatment:   [x]         Patient left in no apparent distress sitting up in chair  []         Patient left in no apparent distress in bed  [x]         Call bell left within reach  [x]         Nursing notified  []         Caregiver present  []         Bed alarm activated    COMMUNICATION/EDUCATION:   [x]         Fall prevention education was provided and the patient/caregiver indicated understanding. [x]         Patient/family have participated as able in goal setting and plan of care. [x]         Patient/family agree to work toward stated goals and plan of care. []         Patient understands intent and goals of therapy, but is neutral about his/her participation. []         Patient is unable to participate in goal setting and plan of care. Patient educated on the role of physical therapy during the acute stay  and the importance of mobility. VU. Needs reinforcement.      Thank you for this referral.  Aaron Baptiste, PT   Time Calculation: 30 mins

## 2018-02-12 NOTE — PROGRESS NOTES
Patient is unable to communicate at this time as she is resting peacefully at this time. Zully Talley offered prayer and left Spiritual Care brochure. Chaplains will continue to follow and will provide pastoral care on an as needed/requested basis.     Robert Gooding   Spiritual Care   [phone]

## 2018-02-12 NOTE — PROGRESS NOTES
Problem: Self Care Deficits Care Plan (Adult)  Goal: *Acute Goals and Plan of Care (Insert Text)  Occupational Therapy Goals  Initiated 2/12/2018 within 7 day(s). 1.  Patient will perform grooming tasks while standing with modified independence. 2.  Patient will perform lower body dressing with modified independence utilizing AE, prn.  3.  Patient will perform functional task in standing for 8 minutes with modified independence to increase activity tolerance for ADLs. 4.  Patient will perform toilet transfers with modified independence. 5.  Patient will perform all aspects of toileting with modified independence. 6.  Patient will participate in upper extremity therapeutic exercise/activities with 8 minutes to maintain BUE strength for functional transfers & ADLs. 7.  Patient will utilize energy conservation techniques during functional activities with minimal verbal cues. Outcome: Progressing Towards Goal  Occupational Therapy EVALUATION    Patient: Candelario Najjar (87 y.o. female)  Date: 2/12/2018  Primary Diagnosis: Sepsis ([facility] [address]        Precautions:  Fall  PLOF: Pt reports independence (with additional time) with basic self care tasks; utilized wheelchair for functional mobility outside of home PTA. ASSESSMENT :  Based on the objective data described below, the patient presents with impairments with regard to activity tolerance and independence in ADLs due to sepsis. Pt at EOB on arrival; c/o 8/10 headache. Pt reports independence w/ ADLs (w/ additional time). Reports ~2 falls within the last 6 months. Pt states her WC does not fit in the apt; does not have RW. Functional transfer from EOB to chair with CGA/min A in prep for maneuvering to bathroom & toileting task. Fair balance; skilled instruction on positioning to ensure safety. Pt drowsy in chair, needs within reach, still reporting 8/10 pain. Starlene Hodgkins, RN notified. Educated on role of OT, POC, and home safety; pt verbalized understanding. Patient will benefit from skilled intervention to address the above impairments. Patients rehabilitation potential is considered to be Good  Factors which may influence rehabilitation potential include:   []             None noted  []             Mental ability/status  [x]             Medical condition  []             Home/family situation and support systems  []             Safety awareness  []             Pain tolerance/management  []             Other:     Recommendations for nursing: up with assist x1       PLAN :  Recommendations and Planned Interventions:  [x]               Self Care Training                  [x]        Therapeutic Activities  [x]               Functional Mobility Training    []        Cognitive Retraining  [x]               Therapeutic Exercises           [x]        Endurance Activities  [x]               Balance Training                   []        Neuromuscular Re-Education  []               Visual/Perceptual Training     [x]   Home Safety Training  [x]               Patient Education                 [x]        Family Training/Education  []               Other (comment):    Frequency/Duration: Patient will be followed by occupational therapy 3 times a week to address goals. Discharge Recommendations: Home Health  Further Equipment Recommendations for Discharge: anticipate none     SUBJECTIVE:   Patient stated My knees just cause me a lot of pain and problems. \"    OBJECTIVE DATA SUMMARY:     Past Medical History:   Diagnosis Date    Bilateral knee pain     [facility] [address]     Depression     [facility]     Diastolic hypertension     Fatty liver     Gastritis     Hypertension     Ill-defined condition     Inflammation of lymphatics     Liver disease     cirrhosis    Menorrhagia     HANKS (nonalcoholic steatohepatitis)     Nausea     Osteoarthritis     Right flank pain     RUQ abdominal pain     Sleep apnea     Does not use consistently    Thrombocytopenia [facility])      Past Surgical History:   Procedure Laterality Date    ENDOSCOPY, COLON, DIAGNOSTIC  5/20/2013    HX HYSTERECTOMY      HX OTHER SURGICAL      liver biopsy    HX OVARIAN CYST REMOVAL      Bilateral     Barriers to Learning/Limitations: None  Compensate with: visual, verbal, tactile, kinesthetic cues/model    GCODES:  Self Care  Current  CJ= 20-39%   Goal  CI= 1-19%. The severity rating is based on the Other Functional assessment, MMT, ROM    Eval Complexity: History: MEDIUM Complexity : Expanded review of history including physical, cognitive and psychosocial  history ; Examination: MEDIUM Complexity : 3-5 performance deficits relating to physical, cognitive , or psychosocial skils that result in activity limitations and / or participation restrictions; Decision Making:MEDIUM Complexity : Patient may present with comorbidities that affect occupational performnce. Miniml to moderate modification of tasks or assistance (eg, physical or verbal ) with assesment(s) is necessary to enable patient to complete evaluation     Prior Level of Function/Home Situation: Pt reports independence (with additional time) with basic self care tasks; utilized wheelchair for functional mobility outside of home PTA. Home Situation  Home Environment: Apartment  # Steps to Enter: 3  Rails to Enter: Yes  Hand Rails : Bilateral  One/Two Story Residence: One story  Living Alone: No  Support Systems: Child(jailyn)  Patient Expects to be Discharged to[de-identified] Apartment  Current DME Used/Available at Home: Wheelchair, Shower chair  Tub or Shower Type: Tub/Shower combination (has shower chair)  [x]  Right hand dominant   []  Left hand dominant    Cognitive/Behavioral Status:  Neurologic State: Alert  Orientation Level: Oriented X4  Cognition: Appropriate decision making; Appropriate for age attention/concentration; Appropriate safety awareness; Follows commands  Safety/Judgement: Awareness of environment; Fall prevention     Skin: intact (BUEs)  Edema: None noted (BUEs)    Vision/Perceptual:    Acuity: Able to read clock/calendar on wall without difficulty      Coordination:  Coordination: Within functional limits (BUEs)  Fine Motor Skills-Upper: Right Intact; Left Intact    Gross Motor Skills-Upper: Right Intact; Left Intact     Balance:  Sitting: Impaired  Sitting - Static: Good (unsupported)  Sitting - Dynamic: Fair (occasional)  Standing: Impaired  Standing - Static: Fair  Standing - Dynamic : Fair     Strength:  Strength: Within functional limits (BUEs: 5/5)    Range of Motion:  AROM: Within functional limits (BUEs: full shoulder/elbow flex)    Functional Mobility and Transfers for ADLs:  Bed Mobility:  Supine to Sit:  (pt sitting at EOB on arrival)  Sit to Supine:  (not asssessed; pt left up in chair)    Transfers:  Sit to Stand: Contact guard assistance;Minimum assistance  Bed to Chair: Contact guard assistance; Additional time    ADL Assessment:  Feeding: Modified independent  Oral Facial Hygiene/Grooming: Setup;Supervision  Bathing: Minimum assistance  Upper Body Dressing: Setup;Supervision  Lower Body Dressing: Minimum assistance  Toileting: Setup;Supervision; Additional time    Cognitive Retraining  Safety/Judgement: Awareness of environment; Fall prevention    Therapeutic Activity:  Functional reaching at EOB in prep for LB dressing; fair balance. Functional transfer from EOB to chair in prep for maneuvering to bathroom & toileting task. Fair balance; skilled instruction on positioning to ensure safety. Pain:  Pre treatment pain level: 8/10  Post treatment pain level: 8/10 Scotty Guy RN notified)  Pain Scale 1: Numeric (0 - 10)   Pain Location: Head    Activity Tolerance:  Fair  Please refer to the flowsheet for vital signs taken during this treatment.   After treatment:   [x] Patient left in no apparent distress sitting up in chair  [] Patient left in no apparent distress in bed  [x] Call bell left within reach  [x] Nursing notified  [] Caregiver present  [] Bed alarm activated    COMMUNICATION/EDUCATION: Pt educated on role of OT, POC, and home safety. She verbalized understanding. [x] Home safety education was provided and the patient/caregiver indicated understanding. [x] Patient/family have participated as able in goal setting and plan of care. [x] Patient/family agree to work toward stated goals and plan of care. [] Patient understands intent and goals of therapy, but is neutral about his/her participation. [] Patient is unable to participate in goal setting and plan of care.     Thank you for this referral.    Ariane Barrett MS OTR/L  Time Calculation: 21 mins

## 2018-02-13 PROBLEM — Z91.199 MEDICAL NON-COMPLIANCE: Chronic | Status: ACTIVE | Noted: 2018-02-13

## 2018-02-13 PROBLEM — Z76.5 DRUG-SEEKING BEHAVIOR: Chronic | Status: ACTIVE | Noted: 2018-02-13

## 2018-02-13 LAB
ANION GAP SERPL CALC-SCNC: 7 MMOL/L (ref 3–18)
BASOPHILS # BLD: 0 K/UL (ref 0–0.06)
BASOPHILS NFR BLD: 0 % (ref 0–2)
BUN SERPL-MCNC: 31 MG/DL (ref 7–18)
BUN/CREAT SERPL: 18 (ref 12–20)
CALCIUM SERPL-MCNC: 7.9 MG/DL (ref 8.5–10.1)
CHLORIDE SERPL-SCNC: 109 MMOL/L (ref 100–108)
CO2 SERPL-SCNC: 23 MMOL/L (ref 21–32)
CREAT SERPL-MCNC: 1.71 MG/DL (ref 0.6–1.3)
CREAT UR-MCNC: 294 MG/DL (ref 30–125)
DIFFERENTIAL METHOD BLD: ABNORMAL
EOSINOPHIL # BLD: 0.4 K/UL (ref 0–0.4)
EOSINOPHIL NFR BLD: 5 % (ref 0–5)
ERYTHROCYTE [DISTWIDTH] IN BLOOD BY AUTOMATED COUNT: 16.4 % (ref 11.6–14.5)
GLUCOSE BLD STRIP.AUTO-MCNC: 112 MG/DL (ref 70–110)
GLUCOSE BLD STRIP.AUTO-MCNC: 135 MG/DL (ref 70–110)
GLUCOSE BLD STRIP.AUTO-MCNC: 144 MG/DL (ref 70–110)
GLUCOSE BLD STRIP.AUTO-MCNC: 159 MG/DL (ref 70–110)
GLUCOSE SERPL-MCNC: 126 MG/DL (ref 74–99)
HCT VFR BLD AUTO: 31.3 % (ref 35–45)
HGB BLD-MCNC: 10.4 G/DL (ref 12–16)
LYMPHOCYTES # BLD: 1.2 K/UL (ref 0.9–3.6)
LYMPHOCYTES NFR BLD: 16 % (ref 21–52)
MCH RBC QN AUTO: 35.6 PG (ref 24–34)
MCHC RBC AUTO-ENTMCNC: 33.2 G/DL (ref 31–37)
MCV RBC AUTO: 107.2 FL (ref 74–97)
MONOCYTES # BLD: 1 K/UL (ref 0.05–1.2)
MONOCYTES NFR BLD: 12 % (ref 3–10)
NEUTS SEG # BLD: 5.4 K/UL (ref 1.8–8)
NEUTS SEG NFR BLD: 67 % (ref 40–73)
PLATELET # BLD AUTO: 45 K/UL (ref 135–420)
PMV BLD AUTO: 9.9 FL (ref 9.2–11.8)
POTASSIUM SERPL-SCNC: 4.3 MMOL/L (ref 3.5–5.5)
RBC # BLD AUTO: 2.92 M/UL (ref 4.2–5.3)
SODIUM SERPL-SCNC: 139 MMOL/L (ref 136–145)
SODIUM UR-SCNC: 19 MMOL/L (ref 20–110)
WBC # BLD AUTO: 8 K/UL (ref 4.6–13.2)

## 2018-02-13 PROCEDURE — P9047 ALBUMIN (HUMAN), 25%, 50ML: HCPCS | Performed by: INTERNAL MEDICINE

## 2018-02-13 PROCEDURE — 74011636637 HC RX REV CODE- 636/637: Performed by: INTERNAL MEDICINE

## 2018-02-13 PROCEDURE — 85025 COMPLETE CBC W/AUTO DIFF WBC: CPT | Performed by: HOSPITALIST

## 2018-02-13 PROCEDURE — 97116 GAIT TRAINING THERAPY: CPT

## 2018-02-13 PROCEDURE — 82570 ASSAY OF URINE CREATININE: CPT | Performed by: INTERNAL MEDICINE

## 2018-02-13 PROCEDURE — 74011250636 HC RX REV CODE- 250/636: Performed by: INTERNAL MEDICINE

## 2018-02-13 PROCEDURE — 65270000029 HC RM PRIVATE

## 2018-02-13 PROCEDURE — 74011250636 HC RX REV CODE- 250/636: Performed by: HOSPITALIST

## 2018-02-13 PROCEDURE — 80048 BASIC METABOLIC PNL TOTAL CA: CPT | Performed by: HOSPITALIST

## 2018-02-13 PROCEDURE — 82962 GLUCOSE BLOOD TEST: CPT

## 2018-02-13 PROCEDURE — 74011250637 HC RX REV CODE- 250/637: Performed by: NURSE PRACTITIONER

## 2018-02-13 PROCEDURE — 74011250637 HC RX REV CODE- 250/637: Performed by: INTERNAL MEDICINE

## 2018-02-13 PROCEDURE — 84300 ASSAY OF URINE SODIUM: CPT | Performed by: INTERNAL MEDICINE

## 2018-02-13 PROCEDURE — 36415 COLL VENOUS BLD VENIPUNCTURE: CPT | Performed by: HOSPITALIST

## 2018-02-13 RX ORDER — LIDOCAINE 50 MG/G
1 PATCH TOPICAL EVERY 24 HOURS
Status: DISCONTINUED | OUTPATIENT
Start: 2018-02-13 | End: 2018-02-14 | Stop reason: HOSPADM

## 2018-02-13 RX ORDER — SODIUM CHLORIDE 9 MG/ML
75 INJECTION, SOLUTION INTRAVENOUS CONTINUOUS
Status: DISCONTINUED | OUTPATIENT
Start: 2018-02-13 | End: 2018-02-14 | Stop reason: HOSPADM

## 2018-02-13 RX ORDER — ALBUMIN HUMAN 250 G/1000ML
25 SOLUTION INTRAVENOUS EVERY 6 HOURS
Status: COMPLETED | OUTPATIENT
Start: 2018-02-13 | End: 2018-02-14

## 2018-02-13 RX ADMIN — LACTULOSE 30 G: 20 SOLUTION ORAL at 18:19

## 2018-02-13 RX ADMIN — TRAMADOL HYDROCHLORIDE 50 MG: 50 TABLET, FILM COATED ORAL at 23:20

## 2018-02-13 RX ADMIN — SPIRONOLACTONE 50 MG: 25 TABLET, FILM COATED ORAL at 10:13

## 2018-02-13 RX ADMIN — TRAMADOL HYDROCHLORIDE 50 MG: 50 TABLET, FILM COATED ORAL at 10:13

## 2018-02-13 RX ADMIN — ALBUMIN (HUMAN) 25 G: 0.25 INJECTION, SOLUTION INTRAVENOUS at 14:13

## 2018-02-13 RX ADMIN — SERTRALINE HYDROCHLORIDE 50 MG: 50 TABLET ORAL at 10:13

## 2018-02-13 RX ADMIN — TRAMADOL HYDROCHLORIDE 50 MG: 50 TABLET, FILM COATED ORAL at 16:23

## 2018-02-13 RX ADMIN — SODIUM CHLORIDE 75 ML/HR: 900 INJECTION, SOLUTION INTRAVENOUS at 18:26

## 2018-02-13 RX ADMIN — LACTULOSE 30 G: 20 SOLUTION ORAL at 10:13

## 2018-02-13 RX ADMIN — GABAPENTIN 300 MG: 300 CAPSULE ORAL at 10:13

## 2018-02-13 RX ADMIN — ALBUMIN (HUMAN) 25 G: 0.25 INJECTION, SOLUTION INTRAVENOUS at 18:21

## 2018-02-13 RX ADMIN — SODIUM CHLORIDE 100 ML/HR: 900 INJECTION, SOLUTION INTRAVENOUS at 05:39

## 2018-02-13 RX ADMIN — IBUPROFEN 600 MG: 600 TABLET, FILM COATED ORAL at 05:37

## 2018-02-13 RX ADMIN — INSULIN LISPRO 2 UNITS: 100 INJECTION, SOLUTION INTRAVENOUS; SUBCUTANEOUS at 14:12

## 2018-02-13 NOTE — CONSULTS
Consult Note    Assessment:   · BAYRON in a patient with liver cirrhosis. Etio is likely volume depletion due to poor oral intake, diarrhea and use of diuretics. HRS has to be considered but patient has to be volume unresponsive for HRS diagnosis. · Liver cirrhosis with liver failure but no ascites. · Fever prior to admission. On empiric abx. BC ngtd. Recommendations:   · Will give gentle ivf and IV albumin. · Hold diuretics. · Obtain urine Na/creatinine. · Avoid NSAID's, IV dye. · Please dose all medications for approximate creatinine clearance   30-15. Thank you. Consult requested by: Verónica Rivero MD    ADMIT DATE: 2/10/2018  CONSULT DATE: February 13, 2018                 Admission diagnosis: Sepsis [facility])   Reason for Nephrology Consultation: BAYRON. HPI: Nghia Woodson is a 56 y.o. female [address] with h/o of morbid obesity, dm, htn and brewer with liver cirrhosis. Patient was recently started on lactulose for hepatic encephalopathy. She presented to ED with several days h/o of worsening chronic abd pain, poor appetite, nausea and diarrhea. She also had fever at home. At the ED patient was found to be borderline hypotensive and afebrile. She was admitted, started on empiric abx. No prior h/o of kidney disease. Scr was 0.86 on admission, yesterday scr was 1.56, today it is 1.71.        Past Medical History:   Diagnosis Date    Bilateral knee pain     Cirrhosis of liver (HCC)     Depression     Diabetes ([facility] [address]     Diastolic hypertension     Fatty liver     Gastritis     Hypertension     Ill-defined condition     Inflammation of lymphatics     Liver disease     cirrhosis    Menorrhagia     BREWER (nonalcoholic steatohepatitis)     Nausea     Osteoarthritis     Right flank pain     RUQ abdominal pain     Sleep apnea     Does not use consistently    Thrombocytopenia (HCC)       Past Surgical History:   Procedure Laterality Date    ENDOSCOPY, COLON, DIAGNOSTIC 5/20/2013    HX HYSTERECTOMY      HX OTHER SURGICAL      liver biopsy    HX OVARIAN CYST REMOVAL      Bilateral       Social History     Social History    Marital status:      Spouse name: N/A    Number of children: N/A    Years of education: N/A     Occupational History    Not on file. Social History Main Topics    Smoking status: Never Smoker    Smokeless tobacco: Never Used    Alcohol use No    Drug use: No    Sexual activity: Yes     Partners: Male     Birth control/ protection: None     Other Topics Concern    Blood Transfusions Yes     prior to hysterectomy    Occupational Exposure No    Hobby Hazards No    Stress Concern No    Weight Concern Yes    Exercise No    Seat Belt Yes    Self-Exams No     Social History Narrative       History reviewed. No pertinent family history. Allergies   Allergen Reactions    Morphine Hives        Home Medications:     Prescriptions Prior to Admission   Medication Sig    cyclobenzaprine (FLEXERIL) 10 mg tablet Take 1 Tab by mouth three (3) times daily as needed.  traMADol (ULTRAM) 50 mg tablet Take 1 Tab by mouth every six (6) hours as needed for Pain. Max Daily Amount: 200 mg.    ondansetron (ZOFRAN ODT) 4 mg disintegrating tablet Take 1 Tab by mouth every eight (8) hours as needed for Nausea.  meclizine (ANTIVERT) 25 mg tablet Take  by mouth three (3) times daily as needed.  furosemide (LASIX) 20 mg tablet Take 1 Tab by mouth daily.  spironolactone (ALDACTONE) 50 mg tablet TAKE 1 TABLET BY MOUTH EVERY DAY    sertraline (ZOLOFT) 50 mg tablet TAKE 1 TABLET BY MOUTH EVERY DAY    metFORMIN (GLUCOPHAGE) 500 mg tablet Take 500 mg by mouth two (2) times daily (with meals).  lisinopril (PRINIVIL, ZESTRIL) 20 mg tablet Take 20 mg by mouth daily.  clotrimazole (LOTRIMIN) 1 % topical cream Apply a thin layer over affected area twice daily for up to 14 days.  gabapentin (NEURONTIN) 300 mg capsule Take 300 mg by mouth daily.     lactulose (CHRONULAC) 10 gram/15 mL solution Take 45 mL by mouth three (3) times daily as needed. Current Inpatient Medications:     Current Facility-Administered Medications   Medication Dose Route Frequency    influenza vaccine 2017-18 (3 yrs+)(PF) (FLUZONE QUAD/FLUARIX QUAD) injection 0.5 mL  0.5 mL IntraMUSCular PRIOR TO DISCHARGE    ibuprofen (MOTRIN) tablet 600 mg  600 mg Oral Q6H PRN    sodium chloride (NS) flush 5-10 mL  5-10 mL IntraVENous PRN    PHARMACY INFORMATION NOTE  1 Each Other Rx Dosing/Monitoring    gabapentin (NEURONTIN) capsule 300 mg  300 mg Oral DAILY    lactulose (CHRONULAC) solution 30 g  30 g Oral BID    ondansetron (ZOFRAN ODT) tablet 4 mg  4 mg Oral Q8H PRN    sertraline (ZOLOFT) tablet 50 mg  50 mg Oral DAILY    spironolactone (ALDACTONE) tablet 50 mg  50 mg Oral DAILY    traMADol (ULTRAM) tablet 50 mg  50 mg Oral Q6H PRN    zolpidem (AMBIEN) tablet 5 mg  5 mg Oral QHS PRN    insulin lispro (HUMALOG) injection   SubCUTAneous AC&HS    glucose chewable tablet 16 g  4 Tab Oral PRN    glucagon (GLUCAGEN) injection 1 mg  1 mg IntraMUSCular PRN    dextrose (D50W) injection syrg 12.5-25 g  25-50 mL IntraVENous PRN       Review of Systems:   No sore throat. No cough or hemoptysis. No shortness of breath or chest pain. No orthopnea or paroxysmal nocturnal dyspnea. No vomiting, abdominal pain, melena or hematochezia. No constipation. No dysuria, no gross hematuria of voiding difficulties. C/o chronic ankle swelling, no joint paints. No muscle aches. No skin changes. No dizziness or lightheadedness. No headaches.        Physical Assessment:     Vitals:    02/12/18 1734 02/13/18 0527 02/13/18 0706 02/13/18 1013   BP: 112/58 118/71  120/78   Pulse: 81 88  80   Resp: 16 16     Temp: 97.9 °F (36.6 °C) 98 °F (36.7 °C)     SpO2: 100% 92%     Weight:   144.6 kg (318 lb 12.6 oz)    Height:         Last 3 Recorded Weights in this Encounter    02/10/18 1930 02/11/18 2237 02/13/18 5887 Weight: 136.1 kg (300 lb) 144.7 kg (319 lb) 144.6 kg (318 lb 12.6 oz)     Admission weight: Weight: 136.1 kg (300 lb) (02/10/18 1930)      Intake/Output Summary (Last 24 hours) at 02/13/18 1203  Last data filed at 02/13/18 0957   Gross per 24 hour   Intake          1796.67 ml   Output                0 ml   Net          1796.67 ml       Patient is in no apparent distress. HEENT: Head is normocephalic and atraumatic. Pupils are round, equal, reactive to light. Sclerae are icteric. Oropharynx clear. Neck: no cervical lymphadenopathy or thyromegaly. Lungs: good air entry, clear to auscultation bilaterally. Trachea at the midline. Cardiovascular system: S1, S2, regular rate and rhythm. No murmurs, gallops or rubs. No jvd. Carotid upstroke 2 + bilaterally. Abdomen: soft, obese, mild diffuse tenderness, non distended. Positive bowel sounds. No hepatosplenomegaly. No abdominal bruits. No ascites. Extremities: obese, no clubbing, cyanosis. Trace to 1+ bl pretibial  edema. Strong dorsalis pedis pulses. Brisk capillary refill on the toes bilaterally. Integumentary: skin is grossly intact. Neurologic: somnolent but arousable,  oriented time three. Cooperative and appropriate. No gross motor or sensory deficits.        Data Review:    Labs: Results:       Chemistry Recent Labs      02/13/18   0551  02/12/18   0634  02/11/18   0548  02/10/18   1950   GLU  126*  111*  187*  205*   NA  139  140  140  139   K  4.3  4.0  4.0  3.8   CL  109*  109*  107  105   CO2  23  23  23  26   BUN  31*  23*  12  7   CREA  1.71*  1.56*  0.86  0.88   CA  7.9*  7.9*  7.6*  8.1*   AGAP  7  8  10  8   BUCR  18  15  14  8*   AP   --    --    --   113   TP   --    --    --   7.1   ALB   --    --    --   2.4*   GLOB   --    --    --   4.7*   AGRAT   --    --    --   0.5*         CBC w/Diff Recent Labs      02/13/18   0551  02/12/18   0634  02/11/18   0548  02/10/18   1950   WBC  8.0  11.6  11.6  5.6   RBC  2.92*  2.84*  2.85*  3.22*   HGB 10.4*  10.2*  10.3*  11.6*   HCT  31.3*  30.2*  30.6*  33.9*   PLT  45*  37*  33*  31*   GRANS  67   --    --   88*   LYMPH  16*   --    --   4*   EOS  5   --    --   0         Iron/Ferritin No results for input(s): IRON in the last 72 hours. No lab exists for component: TIBCCALC   PTH/VIT D No results for input(s): PTH in the last 72 hours.     No lab exists for component: VITD           Alejandro Ennis M.D  [facility]  Office [phone]  Pager [phone]    February 13, 2018

## 2018-02-13 NOTE — PROGRESS NOTES
Problem: Falls - Risk of  Goal: *Absence of Falls  Document Behzad Fall Risk and appropriate interventions in the flowsheet.    Outcome: Progressing Towards Goal  Fall Risk Interventions:  Mobility Interventions: Patient to call before getting OOB         Medication Interventions: Patient to call before getting OOB    Elimination Interventions: Call light in reach

## 2018-02-13 NOTE — PROGRESS NOTES
Patient received at beginning of shift from [address], A&Ox4, in bed, sleeping. Pt does not appear to be in distress and denies any pain and/or discomfort. Safety measures take include bed in lowest locked position, call bell within reach, and personal items at bedside within reach. 1935: Bedside and Verbal shift change report given to Zia Hooper (oncoming nurse) by Morris Jimenez RN (offgoing nurse). Report included the following information SBAR, Kardex, Intake/Output, MAR and Recent Results.

## 2018-02-13 NOTE — PROGRESS NOTES
Problem: Mobility Impaired (Adult and Pediatric)  Goal: *Acute Goals and Plan of Care (Insert Text)  Physical Therapy Goals  Initiated 2/12/2018 and to be accomplished within 7 day(s)  1. Patient will move from supine to sit and sit to supine , scoot up and down and roll side to side in bed with modified independence. 2.  Patient will transfer from bed to chair and chair to bed with modified independence using the least restrictive device. 3.  Patient will perform sit to stand with modified independence. 4.  Patient will ambulate with modified independence for 300 feet with the least restrictive device. 5.  Patient will ascend/descend 3 stairs with  handrail(s) with modified independence. Outcome: Progressing Towards Goal  physical Therapy TREATMENT    Patient: Reggie Nageotte (47 y.o. female)  Date: 2/13/2018  Diagnosis: Sepsis ([facility] [address] Sepsis ([facility] [address]  Precautions: Fall   Chart, physical therapy assessment, plan of care and goals were reviewed. PLOF:independent, ambulatory without AD, lives alone, has many siblings locally to help her  ASSESSMENT:  Pt sitting EOB upon entering room. Ambulated 39 ft total with RW, wide CYNDI due to body habitus, no LOB or path deviations. Attempted a BM in bathroom but unsuccessful at this time. Pt left sitting in chair. Education: RW mgmt and safety  Progression toward goals:  []      Improving appropriately and progressing toward goals  [x]      Improving slowly and progressing toward goals  []      Not making progress toward goals and plan of care will be adjusted     PLAN:  Patient continues to benefit from skilled intervention to address the above impairments. Continue treatment per established plan of care. Discharge Recommendations: [address] [facility]  Further Equipment Recommendations for Discharge:   Possible RW (wide frame or bariatric)     SUBJECTIVE:   Patient stated I have a lot of siblings to help me out if I need it.     OBJECTIVE DATA SUMMARY:   Critical Behavior:  Neurologic State: Alert  Orientation Level: Oriented X4  Cognition: Follows commands  Safety/Judgement: Fall prevention  Functional Mobility Training:  Transfers:  Sit to Stand: Contact guard assistance  Stand to Sit: Stand-by asssistance  Balance:  Sitting: Intact  Sitting - Static: Good (unsupported)  Sitting - Dynamic: Fair (occasional)  Standing: With support  Standing - Static: Fair  Standing - Dynamic : Fair  Ambulation/Gait Training:  Distance (ft): 45 Feet (ft)  Assistive Device: Gait belt;Walker, rolling  Ambulation - Level of Assistance: Contact guard assistance  Gait Abnormalities: Decreased step clearance  Speed/Lety: Slow  Pain:  Pre:0  Post:0  Pain Scale 1: Visual    Activity Tolerance:   Fair(-)  Please refer to the flowsheet for vital signs taken during this treatment.   After treatment:   [x] Patient left in no apparent distress sitting up in chair  [] Patient left in no apparent distress in bed  [x] Call bell left within reach  [] Nursing notified  [] Caregiver present  [] Bed alarm activated      Alfonzo Merritt PTA   Time Calculation: 14 mins

## 2018-02-13 NOTE — PROGRESS NOTES
[facility]  Hospitalist Division        Inpatient Daily Progress Note    Daily progress Note    Patient: Migel Damon MRN: [id number]  CSN: [id number]    YOB: 1960  Age: 56 y.o. Sex: female    DOA: 2/10/2018 LOS:  LOS: 3 days                    Chief Complaint:      Interval History:    Migel Damon is a 56 y.o. female with PMHX of Liver cirrhosis, morbid obesity, DM, HTN  who presented with 2 days of fevers, diarrhea, slightly worse chronic abdominal pain, chills, and general malaise. She reported no sick contacts. Brought in by daughter. No sore throat or runny nose. She follows with Dr Mauricio Carlos and Dr Ian Bowers recently started on lactulose. In ER, she had a temp over 102 though no known source. CT abd non-specific. UA bland. CXR unrevealing. Her Flu screen was negative. Initiated on sepsis protocol. ROS mild SOB. Patient noted with BAYRON and nephrology consulted for pre-renal syndrome vs HRS. Subjective:      C/o musculoskeletal pain and asking for dilaudid. Patient offered Lidoderm patch     Objective:      Visit Vitals    /78    Pulse 80    Temp 98 °F (36.7 °C)    Resp 16    Ht 5' 4\" (1.626 m)    Wt 144.6 kg (318 lb 12.6 oz)    SpO2 92%    BMI 54.72 kg/m2           Physical Exam:  General appearance: drowsy, cooperative, no distress    Lungs: bases diminished, no wheezes or rhonchi   Heart: regular rate and rhythm, S1, S2 normal, no murmur, click, rub or gallop  Abdomen: soft, non tender, non distended. Normoactive bowel sounds   Extremities: extremities normal, atraumatic, no cyanosis. Trace BLE edema   Skin: Skin color, texture, turgor normal. No rashes or lesions  Neurologic: Grossly normal  PSY: appropriately behaved      Intake and Output:  Current Shift:  02/13 0701 - 02/13 1900  In: 480 [P.O.:480]  Out: -   Last three shifts:  02/11 1901 - 02/13 0700  In: 1676.7 [P.O.:600;  I.V.:1076.7]  Out: -     Recent Results (from the past 24 hour(s))   GLUCOSE, POC    Collection Time: 02/12/18  5:28 PM   Result Value Ref Range    Glucose (POC) 154 (H) 70 - 110 mg/dL   GLUCOSE, POC    Collection Time: 02/12/18 10:16 PM   Result Value Ref Range    Glucose (POC) 144 (H) 70 - 939 mg/dL   METABOLIC PANEL, BASIC    Collection Time: 02/13/18  5:51 AM   Result Value Ref Range    Sodium 139 136 - 145 mmol/L    Potassium 4.3 3.5 - 5.5 mmol/L    Chloride 109 (H) 100 - 108 mmol/L    CO2 23 21 - 32 mmol/L    Anion gap 7 3.0 - 18 mmol/L    Glucose 126 (H) 74 - 99 mg/dL    BUN 31 (H) 7.0 - 18 MG/DL    Creatinine 1.71 (H) 0.6 - 1.3 MG/DL    BUN/Creatinine ratio 18 12 - 20      GFR est AA 38 (L) >60 ml/min/1.73m2    GFR est non-AA 31 (L) >60 ml/min/1.73m2    Calcium 7.9 (L) 8.5 - 10.1 MG/DL   CBC WITH AUTOMATED DIFF    Collection Time: 02/13/18  5:51 AM   Result Value Ref Range    WBC 8.0 4.6 - 13.2 K/uL    RBC 2.92 (L) 4.20 - 5.30 M/uL    HGB 10.4 (L) 12.0 - 16.0 g/dL    HCT 31.3 (L) 35.0 - 45.0 %    .2 (H) 74.0 - 97.0 FL    MCH 35.6 (H) 24.0 - 34.0 PG    MCHC 33.2 31.0 - 37.0 g/dL    RDW 16.4 (H) 11.6 - 14.5 %    PLATELET 45 (L) 452 - 420 K/uL    MPV 9.9 9.2 - 11.8 FL    NEUTROPHILS 67 40 - 73 %    LYMPHOCYTES 16 (L) 21 - 52 %    MONOCYTES 12 (H) 3 - 10 %    EOSINOPHILS 5 0 - 5 %    BASOPHILS 0 0 - 2 %    ABS. NEUTROPHILS 5.4 1.8 - 8.0 K/UL    ABS. LYMPHOCYTES 1.2 0.9 - 3.6 K/UL    ABS. MONOCYTES 1.0 0.05 - 1.2 K/UL    ABS. EOSINOPHILS 0.4 0.0 - 0.4 K/UL    ABS.  BASOPHILS 0.0 0.0 - 0.06 K/UL    DF AUTOMATED     GLUCOSE, POC    Collection Time: 02/13/18  7:56 AM   Result Value Ref Range    Glucose (POC) 112 (H) 70 - 110 mg/dL   GLUCOSE, POC    Collection Time: 02/13/18 11:41 AM   Result Value Ref Range    Glucose (POC) 159 (H) 70 - 110 mg/dL   SODIUM, UR, RANDOM    Collection Time: 02/13/18  2:30 PM   Result Value Ref Range    Sodium urine, random 19 (L) 20 - 110 MMOL/L   CREATININE, UR, RANDOM    Collection Time: 02/13/18  2:30 PM   Result Value Ref Range Creatinine, urine 294.00 (H) 30 - 125 mg/dL   GLUCOSE, POC    Collection Time: 02/13/18  4:23 PM   Result Value Ref Range    Glucose (POC) 135 (H) 70 - 110 mg/dL           Lab Results   Component Value Date/Time    Glucose 126 (H) 02/13/2018 05:51 AM    Glucose 111 (H) 02/12/2018 06:34 AM    Glucose 187 (H) 02/11/2018 05:48 AM    Glucose 205 (H) 02/10/2018 07:50 PM    Glucose 198 (H) 12/27/2017 10:26 PM        Assessment/Plan:     Patient Active Problem List   Diagnosis Code    Acquired absence of genital organ Z90.79    Acalculous cholecystitis K81.9    Cirrhosis (HCC) K74.60    Candidal intertrigo B37.2    Diabetes mellitus type 2, controlled (HCC) E11.9    HTN (hypertension) I10    Obesity E66.9    Ascites R18.8    Thrombocytopenia (HCC) D69.6    NAFLD (nonalcoholic fatty liver disease) K76.0    Sepsis (HCC) A41.9    Acute renal failure (ARF) (HCC) N17.9    Drug-seeking behavior Z76.5    Medical non-compliance Z91.19       A/P:  BAYRON  - Nephrology consulted  - Pren-renal vs HRS    Generalized abd pain  - ? acute on chronic r/o SBP - no leukocytosis, CT abd non-specific    NAFLD  - Lactulose    HTN  - Lisinopril on hold 2/2 hypotension, BAYRON    DM-II  - HgbA1C noted to be 5.6 on admission  - SSI     ALEX  - non complaint with CPAP     Drug seeking behavior  - Asking for IV dilaudid for musculoskeletal pain  - Lidoderm patch  - Tramadol     DVT prophylaxis   - SCDs         WESTLEY Tolbert-BC  [address]  Hospitalist Division  Pager:  [phone]  Office:  [phone]

## 2018-02-13 NOTE — PROGRESS NOTES
2000 - Assumed pt care from [address]. Pt in the bathroom. Family at bedside. Frequent use items and call bell within reach. Verbalizes understanding to call for assistance. Bed locked in lowest position. 2230 -  Pt in bed, resting. Not in any form of distress. Family at bedside. 0300 - Pt in bed sleeping.     0805 - Bedside and Verbal shift change report given to Henrry Canela RN (oncoming nurse) by BONNIE Calero (offgoing nurse). Report included the following information SBAR, Kardex and MAR.

## 2018-02-13 NOTE — ROUTINE PROCESS
Bedside and Verbal shift change report given to Amy Montoya RN and Minda Joseph RN (oncoming nurses) by Yasemin Douglas RN (offgoing nurse). Report included the following information SBAR, Kardex, Intake/Output, MAR and Recent Results.

## 2018-02-14 ENCOUNTER — HOME HEALTH ADMISSION (OUTPATIENT)
Dept: HOME HEALTH SERVICES | Facility: HOME HEALTH | Age: 58
End: 2018-02-14
Payer: MEDICAID

## 2018-02-14 VITALS
BODY MASS INDEX: 50.02 KG/M2 | HEART RATE: 84 BPM | OXYGEN SATURATION: 94 % | WEIGHT: 293 LBS | RESPIRATION RATE: 16 BRPM | TEMPERATURE: 98.5 F | DIASTOLIC BLOOD PRESSURE: 75 MMHG | HEIGHT: 64 IN | SYSTOLIC BLOOD PRESSURE: 154 MMHG

## 2018-02-14 LAB
ALBUMIN SERPL-MCNC: 2.9 G/DL (ref 3.4–5)
ANION GAP SERPL CALC-SCNC: 7 MMOL/L (ref 3–18)
BUN SERPL-MCNC: 26 MG/DL (ref 7–18)
BUN/CREAT SERPL: 23 (ref 12–20)
CALCIUM SERPL-MCNC: 8.3 MG/DL (ref 8.5–10.1)
CHLORIDE SERPL-SCNC: 110 MMOL/L (ref 100–108)
CO2 SERPL-SCNC: 24 MMOL/L (ref 21–32)
CREAT SERPL-MCNC: 1.12 MG/DL (ref 0.6–1.3)
GLUCOSE BLD STRIP.AUTO-MCNC: 127 MG/DL (ref 70–110)
GLUCOSE SERPL-MCNC: 139 MG/DL (ref 74–99)
PHOSPHATE SERPL-MCNC: 3 MG/DL (ref 2.5–4.9)
POTASSIUM SERPL-SCNC: 4 MMOL/L (ref 3.5–5.5)
SODIUM SERPL-SCNC: 141 MMOL/L (ref 136–145)

## 2018-02-14 PROCEDURE — 80069 RENAL FUNCTION PANEL: CPT | Performed by: INTERNAL MEDICINE

## 2018-02-14 PROCEDURE — 74011250636 HC RX REV CODE- 250/636: Performed by: INTERNAL MEDICINE

## 2018-02-14 PROCEDURE — 74011250637 HC RX REV CODE- 250/637: Performed by: INTERNAL MEDICINE

## 2018-02-14 PROCEDURE — 82962 GLUCOSE BLOOD TEST: CPT

## 2018-02-14 PROCEDURE — 97530 THERAPEUTIC ACTIVITIES: CPT

## 2018-02-14 PROCEDURE — 36415 COLL VENOUS BLD VENIPUNCTURE: CPT | Performed by: INTERNAL MEDICINE

## 2018-02-14 PROCEDURE — P9047 ALBUMIN (HUMAN), 25%, 50ML: HCPCS | Performed by: INTERNAL MEDICINE

## 2018-02-14 PROCEDURE — 97535 SELF CARE MNGMENT TRAINING: CPT

## 2018-02-14 PROCEDURE — 90686 IIV4 VACC NO PRSV 0.5 ML IM: CPT | Performed by: INTERNAL MEDICINE

## 2018-02-14 PROCEDURE — 90471 IMMUNIZATION ADMIN: CPT

## 2018-02-14 RX ADMIN — SERTRALINE HYDROCHLORIDE 50 MG: 50 TABLET ORAL at 08:55

## 2018-02-14 RX ADMIN — GABAPENTIN 300 MG: 300 CAPSULE ORAL at 08:54

## 2018-02-14 RX ADMIN — INFLUENZA VIRUS VACCINE 0.5 ML: 15; 15; 15; 15 SUSPENSION INTRAMUSCULAR at 10:52

## 2018-02-14 RX ADMIN — TRAMADOL HYDROCHLORIDE 50 MG: 50 TABLET, FILM COATED ORAL at 09:19

## 2018-02-14 RX ADMIN — ALBUMIN (HUMAN) 25 G: 0.25 INJECTION, SOLUTION INTRAVENOUS at 07:59

## 2018-02-14 RX ADMIN — ALBUMIN (HUMAN) 25 G: 0.25 INJECTION, SOLUTION INTRAVENOUS at 02:02

## 2018-02-14 NOTE — PROGRESS NOTES
Problem: Falls - Risk of  Goal: *Absence of Falls  Document Behzad Fall Risk and appropriate interventions in the flowsheet.    Outcome: Progressing Towards Goal  Fall Risk Interventions:  Mobility Interventions: Patient to call before getting OOB         Medication Interventions: Patient to call before getting OOB    Elimination Interventions: Bed/chair exit alarm, Call light in reach

## 2018-02-14 NOTE — PROGRESS NOTES
Assumed patient care. Received patient asleep. No s/s of pain/ discomfort noted. Bed is locked and in lowest position and call bell is within reach. Not in acute distress.

## 2018-02-14 NOTE — PROGRESS NOTES
Problem: Self Care Deficits Care Plan (Adult)  Goal: *Acute Goals and Plan of Care (Insert Text)  Occupational Therapy Goals  Initiated 2/12/2018 within 7 day(s). 1.  Patient will perform grooming tasks while standing with modified independence. 2.  Patient will perform lower body dressing with modified independence utilizing AE, prn.  3.  Patient will perform functional task in standing for 8 minutes with modified independence to increase activity tolerance for ADLs. 4.  Patient will perform toilet transfers with modified independence. 5.  Patient will perform all aspects of toileting with modified independence. 6.  Patient will participate in upper extremity therapeutic exercise/activities with 8 minutes to maintain BUE strength for functional transfers & ADLs. 7.  Patient will utilize energy conservation techniques during functional activities with minimal verbal cues. Outcome: Progressing Towards Goal  Occupational Therapy TREATMENT    Patient: Binu Jung (47 y.o. female)  Date: 2/14/2018  Diagnosis: Sepsis ([facility] [address] Sepsis ([facility] [address]       Precautions: Fall  Chart, occupational therapy assessment, plan of care, and goals were reviewed. PLOF: Modified Independent    ASSESSMENT:  Pt OOB seated in chair upon entry. Pt educated on use of adaptive equipment w/LB dressing ADLs. Pt requires increase time and decrease standing balance requires CGA w/clothing mgt. Verbal cues for hand placement w/functiona tranfers to standing and max verbal/tactile cues for RW safety/mgt w/functional mobility to bathroom. EDUCATION Pt educated on use of adaptive equipment w/LB ADLs. Issued sock aid and reacher.   Progression toward goals:  [x]          Improving appropriately and progressing toward goals  []          Improving slowly and progressing toward goals  []          Not making progress toward goals and plan of care will be adjusted     PLAN:  Patient continues to benefit from skilled intervention to address the above impairments. Continue treatment per established plan of care. Discharge Recommendations:  Home Health  Further Equipment Recommendations for Discharge:  shower chair and rolling walker     SUBJECTIVE:   Patient stated My daughter has a walker but it's really old.     OBJECTIVE DATA SUMMARY:       Cognitive/Behavioral Status:  Neurologic State: Alert  Orientation Level: Oriented X4  Cognition: Follows commands  Safety/Judgement: Fall prevention  Functional Mobility and Transfers for ADLs:   Transfers:  Sit to Stand: Contact guard assistance  Bed to Chair: Contact guard assistance   Bathroom Mobility: Contact guard assistance (w/RW)   Balance:  Sitting: Intact  Sitting - Static: Good (unsupported)  Sitting - Dynamic: Fair (occasional)  Standing: With support  Standing - Static: Fair  Standing - Dynamic : Fair  ADL Intervention:  Lower Body Dressing Assistance  Underpants: Contact guard assistance  Socks: Stand-by assistance  Leg Crossed Method Used: No  Position Performed: Seated in chair  Cues: Don;Docharles  Adaptive Equipment Used: Reacher;Sock aid    Pain:  Pre Treatment:0  Post Treatment:0    Activity Tolerance:    Fair    Please refer to the flowsheet for vital signs taken during this treatment.   After treatment:   [x]  Patient left in no apparent distress sitting up in chair  []  Patient left in no apparent distress in bed  [x]  Call bell left within reach  [x]  Nursing notified  []  Caregiver present  []  Bed alarm activated    KEHINDE Menon  Time Calculation: 25 mins

## 2018-02-14 NOTE — PROGRESS NOTES
Offered the pt FOC for home care , she chose [facility]. Pt verified the contact information on the face sheet is correct. Referral sent to intake via [facility] and called to the [address] [place] [address] for f/u. Care Management Interventions  PCP Verified by CM: Yes (Dr Rex Mora)  Mode of Transport at Discharge:  Other (see comment)  Transition of Care Consult (CM Consult): [address]: Yes  Current Support Network: Lives Alone (apartment)  Confirm Follow Up Transport: Family  Plan discussed with Pt/Family/Caregiver: Yes  Freedom of Choice Offered: Yes  Discharge Location  Discharge Placement: Home with home health

## 2018-02-14 NOTE — PROGRESS NOTES
[address] pt care from [place], [address] [place] [address]. Pt in the bathroom. Alert and oriented x 4. Not in any form of distress. Family at bedside. Frequent use items and call bell within reach. Verbalizes understanding to call for assistance. Bed locked in lowest position. 0230 - Pt was found crying; stated: \" My head hurts so much, I need more pain medicine. \" Explained that prescribed pain medication was not due until 0500. Encouraged pt to utilize non pharmacological pain interventions. 0320 - Pt sleeping. Not in any distress. 0730 - Bedside and Verbal shift change report given to Madelyn Pena RN (oncoming nurse) by Grabiel Kendall RN (offgoing nurse). Report included the following information SBAR, Kardex, MAR and Recent Results.

## 2018-02-14 NOTE — DISCHARGE INSTRUCTIONS
DISCHARGE SUMMARY from Nurse    PATIENT INSTRUCTIONS:    After general anesthesia or intravenous sedation, for 24 hours or while taking prescription Narcotics:  · Limit your activities  · Do not drive and operate hazardous machinery  · Do not make important personal or business decisions  · Do  not drink alcoholic beverages  · If you have not urinated within 8 hours after discharge, please contact your surgeon on call. Report the following to your surgeon:  · Excessive pain, swelling, redness or odor of or around the surgical area  · Temperature over 100.5  · Nausea and vomiting lasting longer than 4 hours or if unable to take medications  · Any signs of decreased circulation or nerve impairment to extremity: change in color, persistent  numbness, tingling, coldness or increase pain  · Any questions    What to do at Home:  Recommended activity: Activity as tolerated    If you experience any of the following symptoms as listed in the discharge instructions as \"When to Call for Help\", please follow up with your primary care physician and/or call 911. *  Please give a list of your current medications to your Primary Care Provider. *  Please update this list whenever your medications are discontinued, doses are      changed, or new medications (including over-the-counter products) are added. *  Please carry medication information at all times in case of emergency situations. These are general instructions for a healthy lifestyle:    No smoking/ No tobacco products/ Avoid exposure to second hand smoke  Surgeon General's Warning:  Quitting smoking now greatly reduces serious risk to your health.     Obesity, smoking, and sedentary lifestyle greatly increases your risk for illness    A healthy diet, regular physical exercise & weight monitoring are important for maintaining a healthy lifestyle    You may be retaining fluid if you have a history of heart failure or if you experience any of the following symptoms:  Weight gain of 3 pounds or more overnight or 5 pounds in a week, increased swelling in our hands or feet or shortness of breath while lying flat in bed. Please call your doctor as soon as you notice any of these symptoms; do not wait until your next office visit. Recognize signs and symptoms of STROKE:    F-face looks uneven    A-arms unable to move or move unevenly    S-speech slurred or non-existent    T-time-call 911 as soon as signs and symptoms begin-DO NOT go       Back to bed or wait to see if you get better-TIME IS BRAIN. Warning Signs of HEART ATTACK     Call 911 if you have these symptoms:   Chest discomfort. Most heart attacks involve discomfort in the center of the chest that lasts more than a few minutes, or that goes away and comes back. It can feel like uncomfortable pressure, squeezing, fullness, or pain.  Discomfort in other areas of the upper body. Symptoms can include pain or discomfort in one or both arms, the back, neck, jaw, or stomach.  Shortness of breath with or without chest discomfort.  Other signs may include breaking out in a cold sweat, nausea, or lightheadedness. Don't wait more than five minutes to call 911 - MINUTES MATTER! Fast action can save your life. Calling 911 is almost always the fastest way to get lifesaving treatment. Emergency Medical Services staff can begin treatment when they arrive -- up to an hour sooner than if someone gets to the hospital by car. The discharge information has been reviewed with the patient. The patient verbalized understanding. Discharge medications reviewed with the patient and appropriate educational materials and side effects teaching were provided. ___________________________________________________________________________________________________________________________________       Learning About Sepsis  What is sepsis?     Sepsis is an intense reaction to an infection that can cause life-threatening damage to the body. The body reacts to the infection with widespread inflammation. It can damage tissue and organs and lead to very low blood pressure. Sepsis requires immediate care in a hospital. People with sepsis might need to be treated in an intensive care unit (ICU) for several days or weeks. An ICU is a part of the hospital where very sick people get care. Severe sepsis is called septic shock. It often causes extremely low blood pressure, which limits blood flow to the body. It can cause organ failure and death. A long-term or sudden illness can cause sepsis. So can an injury or a reaction to surgery. Sepsis can develop very quickly. Symptoms can include low blood pressure, breathing problems, fast heartbeat, and confusion. Other symptoms include fever or low body temperature, chills, cool clammy skin, skin rashes, and shaking. Sepsis can cause problems in many organs. How is sepsis treated? When a person has sepsis, equipment in the ICU can support many body systems. That includes breathing, circulation, fluids, and help for organs like the kidneys and heart. If the person needs help breathing, a ventilator may be used. Doctors will treat the person with antibiotics. They will try to find the infection that led to sepsis. Machines will track the person's vital signs, including temperature, blood pressure, breathing rate, and pulse rate. The person will get fluids through an IV and will get medicine to help blood flow. If the sepsis is severe, medicine may help raise the blood pressure. What can you expect when someone has sepsis? · The ICU staff will do everything they can to treat all of the problems sepsis causes, including the infection. · The person may start new treatments while still in the hospital. Different doctors may help with different symptoms. · Expect a long recovery after the person leaves the ICU. · If you need it, ask for support from friends and family.   There's a lot going on in the ICU. It can be scary and confusing for patients and their families, friends, and supporters. But it's designed to keep your loved one comfortable and safe and to provide the best medical care. Where can you learn more? Go to http://adela-santhosh.info/. Enter E240 in the search box to learn more about \"Learning About Sepsis. \"  Current as of: March 20, 2017  Content Version: 11.4  © 9883-0016 [employer]. Care instructions adapted under license by [employer] (which disclaims liability or warranty for this information). If you have questions about a medical condition or this instruction, always ask your healthcare professional. [place] [address] any warranty or liability for your use of this information. Learning About Sepsis  What is sepsis? Sepsis is an intense reaction to an infection that can cause life-threatening damage to the body. The body reacts to the infection with widespread inflammation. It can damage tissue and organs and lead to very low blood pressure. Sepsis requires immediate care in a hospital. People with sepsis might need to be treated in an intensive care unit (ICU) for several days or weeks. An ICU is a part of the hospital where very sick people get care. Severe sepsis is called septic shock. It often causes extremely low blood pressure, which limits blood flow to the body. It can cause organ failure and death. A long-term or sudden illness can cause sepsis. So can an injury or a reaction to surgery. Sepsis can develop very quickly. Symptoms can include low blood pressure, breathing problems, fast heartbeat, and confusion. Other symptoms include fever or low body temperature, chills, cool clammy skin, skin rashes, and shaking. Sepsis can cause problems in many organs. How is sepsis treated? When a person has sepsis, equipment in the ICU can support many body systems.  That includes breathing, circulation, fluids, and help for organs like the kidneys and heart. If the person needs help breathing, a ventilator may be used. Doctors will treat the person with antibiotics. They will try to find the infection that led to sepsis. Machines will track the person's vital signs, including temperature, blood pressure, breathing rate, and pulse rate. The person will get fluids through an IV and will get medicine to help blood flow. If the sepsis is severe, medicine may help raise the blood pressure. What can you expect when someone has sepsis? · The ICU staff will do everything they can to treat all of the problems sepsis causes, including the infection. · The person may start new treatments while still in the hospital. Different doctors may help with different symptoms. · Expect a long recovery after the person leaves the ICU. · If you need it, ask for support from friends and family. There's a lot going on in the ICU. It can be scary and confusing for patients and their families, friends, and supporters. But it's designed to keep your loved one comfortable and safe and to provide the best medical care. Where can you learn more? Go to http://adela-santhosh.info/. Enter E932 in the search box to learn more about \"Learning About Sepsis. \"  Current as of: March 20, 2017  Content Version: 11.4  © 7701-9851 [email or web address]. Care instructions adapted under license by [employer] (which disclaims liability or warranty for this information). If you have questions about a medical condition or this instruction, always ask your healthcare professional. [place] [address] any warranty or liability for your use of this information.     DISCHARGE SUMMARY from Nurse    PATIENT INSTRUCTIONS:    After general anesthesia or intravenous sedation, for 24 hours or while taking prescription Narcotics:  · Limit your activities  · Do not drive and operate hazardous machinery  · Do not make important personal or business decisions  · Do  not drink alcoholic beverages  · If you have not urinated within 8 hours after discharge, please contact your surgeon on call. Report the following to your surgeon:  · Excessive pain, swelling, redness or odor of or around the surgical area  · Temperature over 100.5  · Nausea and vomiting lasting longer than 4 hours or if unable to take medications  · Any signs of decreased circulation or nerve impairment to extremity: change in color, persistent  numbness, tingling, coldness or increase pain  · Any questions    What to do at Home:  Recommended activity: Activity as tolerated, or as physician advised    If you experience any of the following symptoms of a Stroke, Warning Signs of a Heart Attack and When to Call for help as listed in discharge teachings, please follow up with your primary care physician or call 911. *  Please give a list of your current medications to your Primary Care Provider. *  Please update this list whenever your medications are discontinued, doses are      changed, or new medications (including over-the-counter products) are added. *  Please carry medication information at all times in case of emergency situations. These are general instructions for a healthy lifestyle:    No smoking/ No tobacco products/ Avoid exposure to second hand smoke  Surgeon General's Warning:  Quitting smoking now greatly reduces serious risk to your health. Obesity, smoking, and sedentary lifestyle greatly increases your risk for illness    A healthy diet, regular physical exercise & weight monitoring are important for maintaining a healthy lifestyle    You may be retaining fluid if you have a history of heart failure or if you experience any of the following symptoms:  Weight gain of 3 pounds or more overnight or 5 pounds in a week, increased swelling in our hands or feet or shortness of breath while lying flat in bed.   Please call your doctor as soon as you notice any of these symptoms; do not wait until your next office visit. Recognize signs and symptoms of STROKE:    F-face looks uneven    A-arms unable to move or move unevenly    S-speech slurred or non-existent    T-time-call 911 as soon as signs and symptoms begin-DO NOT go       Back to bed or wait to see if you get better-TIME IS BRAIN. Warning Signs of HEART ATTACK     Call 911 if you have these symptoms:   Chest discomfort. Most heart attacks involve discomfort in the center of the chest that lasts more than a few minutes, or that goes away and comes back. It can feel like uncomfortable pressure, squeezing, fullness, or pain.  Discomfort in other areas of the upper body. Symptoms can include pain or discomfort in one or both arms, the back, neck, jaw, or stomach.  Shortness of breath with or without chest discomfort.  Other signs may include breaking out in a cold sweat, nausea, or lightheadedness. Don't wait more than five minutes to call 911 - MINUTES MATTER! Fast action can save your life. Calling 911 is almost always the fastest way to get lifesaving treatment. Emergency Medical Services staff can begin treatment when they arrive -- up to an hour sooner than if someone gets to the hospital by car. The discharge information has been reviewed with the patient. The patient verbalized understanding. Discharge medications reviewed with the patient and appropriate educational materials and side effects teaching were provided. ___________________________________________________________________________________________________________________________________    MyChart Activation    Thank you for requesting access to [employer]. Please follow the instructions below to securely access and download your online medical record. [employer] allows you to send messages to your doctor, view your test results, renew your prescriptions, schedule appointments, and more. How Do I Sign Up? 1.  In your internet browser, go to [email or web address]  2. Click on the First Time User? Click Here link in the Sign In box. You will be redirect to the New Member Sign Up page. 3. Enter your [employer] Access Code exactly as it appears below. You will not need to use this code after youve completed the sign-up process. If you do not sign up before the expiration date, you must request a new code. [employer] Access Code: [email or web address]  Expires: 3/28/2018 12:28 AM (This is the date your [employer] access code will )    4. Enter the last four digits of your Social Security Number (xxxx) and Date of Birth (mm/dd/yyyy) as indicated and click Submit. You will be taken to the next sign-up page. 5. Create a [employer] ID. This will be your [employer] login ID and cannot be changed, so think of one that is secure and easy to remember. 6. Create a [employer] password. You can change your password at any time. 7. Enter your Password Reset Question and Answer. This can be used at a later time if you forget your password. 8. Enter your e-mail address. You will receive e-mail notification when new information is available in [address] 9. Click Sign Up. You can now view and download portions of your medical record. 10. Click the Download Summary menu link to download a portable copy of your medical information. Additional Information    If you have questions, please visit the Frequently Asked Questions section of the [employer] website at [email or web address] Remember, [employer] is NOT to be used for urgent needs. For medical emergencies, dial 911.

## 2018-02-16 LAB
BACTERIA SPEC CULT: NORMAL
BACTERIA SPEC CULT: NORMAL
SERVICE CMNT-IMP: NORMAL
SERVICE CMNT-IMP: NORMAL

## 2018-02-16 NOTE — DISCHARGE SUMMARY
[facility]    Discharge Summary    Patient: Candelario Najjar MRN: [id number]  [facility]: [id number]    YOB: 1960  Age: 56 y.o.   Sex: female    DOA: 2/10/2018 LOS:  LOS: 4 days   Discharge Date: 2/14/2018     Admission Diagnoses: Sepsis [facility])    Discharge Diagnoses:    Problem List as of 2/14/2018  Date Reviewed: 2/13/2018          Codes Class Noted - Resolved    Drug-seeking behavior (Chronic) ICD-10-CM: Z76.5  ICD-9-CM: 305.90  2/13/2018 - Present        Medical non-compliance (Chronic) ICD-10-CM: Z91.19  ICD-9-CM: V15.81  2/13/2018 - Present        Acute renal failure (ARF) ([facility] ICD-10-CM: N17.9  ICD-9-CM: 584.9  2/12/2018 - Present        * (Principal)Sepsis ([facility] ICD-10-CM: A41.9  ICD-9-CM: 038.9, 995.91  2/10/2018 - Present        NAFLD (nonalcoholic fatty liver disease) ICD-10-CM: K76.0  ICD-9-CM: 571.8  11/12/2017 - Present        Thrombocytopenia ([facility] ICD-10-CM: D69.6  ICD-9-CM: 287.5  8/27/2017 - Present        Ascites ICD-10-CM: R18.8  ICD-9-CM: 789.59  5/19/2017 - Present        Diabetes mellitus type 2, controlled ([facility] ICD-10-CM: E11.9  ICD-9-CM: 250.00  1/31/2017 - Present        HTN (hypertension) ICD-10-CM: I10  ICD-9-CM: 401.9  1/31/2017 - Present        Obesity ICD-10-CM: E66.9  ICD-9-CM: 278.00  1/31/2017 - Present        Candidal intertrigo ICD-10-CM: B37.2  ICD-9-CM: 112.3  7/28/2014 - Present        Acalculous cholecystitis ICD-10-CM: K81.9  ICD-9-CM: 575.10  2/27/2014 - Present        Cirrhosis ([facility] ICD-10-CM: K74.60  ICD-9-CM: 571.5  2/27/2014 - Present        Acquired absence of genital organ ICD-10-CM: Z90.79  ICD-9-CM: V45.77  4/30/2012 - Present        RESOLVED: Hyperglycemia ICD-10-CM: R73.9  ICD-9-CM: 790.29  5/19/2017 - 8/27/2017        RESOLVED: End stage liver disease ([facility] ICD-10-CM: K72.90  ICD-9-CM: 572.8  5/19/2017 - 8/27/2017        RESOLVED: HANKS (nonalcoholic steatohepatitis) ICD-10-CM: K75.81  ICD-9-CM: 571.8  9/25/2014 - 5/22/2017        RESOLVED: Bacterial vaginitis ICD-10-CM: N76.0, B96.89  ICD-9-CM: 616.10, 041.9  8/31/2011 - 5/22/2017        RESOLVED: Fibroid ICD-10-CM: D25.9  ICD-9-CM: 218.9  4/25/2011 - 4/30/2012        RESOLVED: ADENOMYOSIS ICD-9-CM: 617.0  4/25/2011 - 4/30/2012              Discharge Condition: Stable    Discharge To: Home    Consults: Hospitalist and Nephrology    Hospital Course: Nathan Del Rosario a 56 y. o. female with PMHX of Liver cirrhosis, morbid obesity, DM, HTN  who presented with 2 days of fevers, diarrhea, slightly worse chronic abdominal pain, chills, and general malaise. She reported no sick contacts. Brought in by daughter. No sore throat or runny nose. She follows with Dr Arron Fraser and Dr Karolyn Lugo recently started on lactulose.  In ER, she had a temp over 102 though no known source. CT abd non-specific. UA bland. CXR unrevealing. Her flu screen was negative. Initiated on sepsis protocol. ROS mild SOB. Patient noted with [facility] and nephrology consulted for pre-renal syndrome vs HRS. Patient responded to IVF- with serum Cr going from 1.71->1. 12. Patient recommended to hold diuretics and ACE until following up with PCP and renal function is rechecked. Patient is appropriate for discharge home. Physical Exam:  General appearance: drowsy, cooperative, no distress    Lungs: bases diminished, no wheezes or rhonchi   Heart: regular rate and rhythm, S1, S2 normal, no murmur, click, rub or gallop  Abdomen: soft, non tender, non distended. Normoactive bowel sounds   Extremities: extremities normal, atraumatic, no cyanosis.  Trace BLE edema   Skin: Skin color, texture, turgor normal. No rashes or lesions  Neurologic: Grossly normal  PSY: appropriately behaved          Discharge Medications:     Discharge Medication List as of 2/14/2018 10:57 AM      CONTINUE these medications which have NOT CHANGED    Details   cyclobenzaprine (FLEXERIL) 10 mg tablet Take 1 Tab by mouth three (3) times daily as needed., Historical Med      traMADol (ULTRAM) 50 mg tablet Take 1 Tab by mouth every six (6) hours as needed for Pain. Max Daily Amount: 200 mg., Print, Disp-20 Tab, R-0      ondansetron (ZOFRAN ODT) 4 mg disintegrating tablet Take 1 Tab by mouth every eight (8) hours as needed for Nausea. , Print, Disp-10 Tab, R-0      meclizine (ANTIVERT) 25 mg tablet Take  by mouth three (3) times daily as needed., Historical Med      sertraline (ZOLOFT) 50 mg tablet TAKE 1 TABLET BY MOUTH EVERY DAY, Historical Med, R-1      metFORMIN (GLUCOPHAGE) 500 mg tablet Take 500 mg by mouth two (2) times daily (with meals). , Historical Med      clotrimazole (LOTRIMIN) 1 % topical cream Apply a thin layer over affected area twice daily for up to 14 days. , Normal, Disp-15 g, R-2      gabapentin (NEURONTIN) 300 mg capsule Take 300 mg by mouth daily. , Historical Med      lactulose (CHRONULAC) 10 gram/15 mL solution Take 45 mL by mouth three (3) times daily as needed., Historical Med, R-1         STOP taking these medications       furosemide (LASIX) 20 mg tablet Comments:   Reason for Stopping:         spironolactone (ALDACTONE) 50 mg tablet Comments:   Reason for Stopping:         lisinopril (PRINIVIL, ZESTRIL) 20 mg tablet Comments:   Reason for Stopping:                 Activity: Activity as tolerated    Diet: Renal Diet    Wound Care: None needed    Follow-up: PCP within 1 week      Discharge time: 45 minutes   Madelin Guerrero NP  2/15/2018, 10:06 PM

## 2018-02-17 ENCOUNTER — HOME CARE VISIT (OUTPATIENT)
Dept: HOME HEALTH SERVICES | Facility: HOME HEALTH | Age: 58
End: 2018-02-17

## 2018-02-18 ENCOUNTER — HOME CARE VISIT (OUTPATIENT)
Dept: HOME HEALTH SERVICES | Facility: HOME HEALTH | Age: 58
End: 2018-02-18

## 2018-02-18 ENCOUNTER — HOME CARE VISIT (OUTPATIENT)
Dept: SCHEDULING | Facility: HOME HEALTH | Age: 58
End: 2018-02-18
Payer: MEDICAID

## 2018-02-18 PROCEDURE — 400013 HH SOC

## 2018-02-18 PROCEDURE — G0299 HHS/HOSPICE OF RN EA 15 MIN: HCPCS

## 2018-02-19 ENCOUNTER — HOME CARE VISIT (OUTPATIENT)
Dept: HOME HEALTH SERVICES | Facility: HOME HEALTH | Age: 58
End: 2018-02-19
Payer: MEDICAID

## 2018-02-20 VITALS
DIASTOLIC BLOOD PRESSURE: 80 MMHG | HEART RATE: 72 BPM | TEMPERATURE: 98.4 F | OXYGEN SATURATION: 97 % | RESPIRATION RATE: 15 BRPM | SYSTOLIC BLOOD PRESSURE: 124 MMHG

## 2018-02-21 ENCOUNTER — HOME CARE VISIT (OUTPATIENT)
Dept: HOME HEALTH SERVICES | Facility: HOME HEALTH | Age: 58
End: 2018-02-21
Payer: MEDICAID

## 2018-02-21 ENCOUNTER — HOSPITAL ENCOUNTER (EMERGENCY)
Age: 58
Discharge: HOME | End: 2018-02-21
Attending: EMERGENCY MEDICINE
Payer: MEDICAID

## 2018-02-21 VITALS
BODY MASS INDEX: 50.02 KG/M2 | HEART RATE: 88 BPM | HEIGHT: 64 IN | DIASTOLIC BLOOD PRESSURE: 76 MMHG | TEMPERATURE: 98.2 F | SYSTOLIC BLOOD PRESSURE: 130 MMHG | RESPIRATION RATE: 20 BRPM | OXYGEN SATURATION: 98 % | WEIGHT: 293 LBS

## 2018-02-21 DIAGNOSIS — R10.9 RIGHT FLANK PAIN: ICD-10-CM

## 2018-02-21 DIAGNOSIS — N30.00 ACUTE CYSTITIS WITHOUT HEMATURIA: ICD-10-CM

## 2018-02-21 DIAGNOSIS — N12 PYELONEPHRITIS: Primary | ICD-10-CM

## 2018-02-21 DIAGNOSIS — D70.9 NEUTROPENIA, UNSPECIFIED TYPE (HCC): ICD-10-CM

## 2018-02-21 LAB
ALBUMIN SERPL-MCNC: 2.7 G/DL (ref 3.4–5)
ALBUMIN/GLOB SERPL: 0.7 {RATIO} (ref 0.8–1.7)
ALP SERPL-CCNC: 104 U/L (ref 45–117)
ALT SERPL-CCNC: 28 U/L (ref 13–56)
ANION GAP SERPL CALC-SCNC: 8 MMOL/L (ref 3–18)
APPEARANCE UR: ABNORMAL
AST SERPL-CCNC: 71 U/L (ref 15–37)
BACTERIA URNS QL MICRO: ABNORMAL /HPF
BASOPHILS # BLD: 0 K/UL (ref 0–0.06)
BASOPHILS NFR BLD: 1 % (ref 0–2)
BILIRUB SERPL-MCNC: 5.4 MG/DL (ref 0.2–1)
BILIRUB UR QL: ABNORMAL
BUN SERPL-MCNC: 4 MG/DL (ref 7–18)
BUN/CREAT SERPL: 6 (ref 12–20)
CALCIUM SERPL-MCNC: 7.9 MG/DL (ref 8.5–10.1)
CHLORIDE SERPL-SCNC: 103 MMOL/L (ref 100–108)
CO2 SERPL-SCNC: 28 MMOL/L (ref 21–32)
COLOR UR: ABNORMAL
CREAT SERPL-MCNC: 0.71 MG/DL (ref 0.6–1.3)
DIFFERENTIAL METHOD BLD: ABNORMAL
EOSINOPHIL # BLD: 0.1 K/UL (ref 0–0.4)
EOSINOPHIL NFR BLD: 3 % (ref 0–5)
EPITH CASTS URNS QL MICRO: ABNORMAL /LPF (ref 0–5)
ERYTHROCYTE [DISTWIDTH] IN BLOOD BY AUTOMATED COUNT: 16.8 % (ref 11.6–14.5)
GLOBULIN SER CALC-MCNC: 4.1 G/DL (ref 2–4)
GLUCOSE SERPL-MCNC: 137 MG/DL (ref 74–99)
GLUCOSE UR STRIP.AUTO-MCNC: NEGATIVE MG/DL
HCT VFR BLD AUTO: 31.4 % (ref 35–45)
HGB BLD-MCNC: 10.6 G/DL (ref 12–16)
HGB UR QL STRIP: NEGATIVE
KETONES UR QL STRIP.AUTO: NEGATIVE MG/DL
LEUKOCYTE ESTERASE UR QL STRIP.AUTO: ABNORMAL
LIPASE SERPL-CCNC: 212 U/L (ref 73–393)
LYMPHOCYTES # BLD: 0.9 K/UL (ref 0.9–3.6)
LYMPHOCYTES NFR BLD: 35 % (ref 21–52)
MCH RBC QN AUTO: 35.8 PG (ref 24–34)
MCHC RBC AUTO-ENTMCNC: 33.8 G/DL (ref 31–37)
MCV RBC AUTO: 106.1 FL (ref 74–97)
MONOCYTES # BLD: 0.3 K/UL (ref 0.05–1.2)
MONOCYTES NFR BLD: 12 % (ref 3–10)
MUCOUS THREADS URNS QL MICRO: ABNORMAL /LPF
NEUTS SEG # BLD: 1.3 K/UL (ref 1.8–8)
NEUTS SEG NFR BLD: 49 % (ref 40–73)
NITRITE UR QL STRIP.AUTO: POSITIVE
PH UR STRIP: 7 [PH] (ref 5–8)
PLATELET # BLD AUTO: 30 K/UL (ref 135–420)
PMV BLD AUTO: 8.8 FL (ref 9.2–11.8)
POTASSIUM SERPL-SCNC: 3.9 MMOL/L (ref 3.5–5.5)
PROT SERPL-MCNC: 6.8 G/DL (ref 6.4–8.2)
PROT UR STRIP-MCNC: ABNORMAL MG/DL
RBC # BLD AUTO: 2.96 M/UL (ref 4.2–5.3)
RBC #/AREA URNS HPF: 0 /HPF (ref 0–5)
SODIUM SERPL-SCNC: 139 MMOL/L (ref 136–145)
SP GR UR REFRACTOMETRY: 1.02 (ref 1–1.03)
UROBILINOGEN UR QL STRIP.AUTO: >8 EU/DL (ref 0.2–1)
WBC # BLD AUTO: 2.7 K/UL (ref 4.6–13.2)
WBC URNS QL MICRO: ABNORMAL /HPF (ref 0–4)

## 2018-02-21 PROCEDURE — 83690 ASSAY OF LIPASE: CPT | Performed by: EMERGENCY MEDICINE

## 2018-02-21 PROCEDURE — 74011250636 HC RX REV CODE- 250/636: Performed by: EMERGENCY MEDICINE

## 2018-02-21 PROCEDURE — 74011250637 HC RX REV CODE- 250/637: Performed by: EMERGENCY MEDICINE

## 2018-02-21 PROCEDURE — 99285 EMERGENCY DEPT VISIT HI MDM: CPT

## 2018-02-21 PROCEDURE — 96374 THER/PROPH/DIAG INJ IV PUSH: CPT

## 2018-02-21 PROCEDURE — 80053 COMPREHEN METABOLIC PANEL: CPT | Performed by: EMERGENCY MEDICINE

## 2018-02-21 PROCEDURE — 85025 COMPLETE CBC W/AUTO DIFF WBC: CPT | Performed by: EMERGENCY MEDICINE

## 2018-02-21 PROCEDURE — 96361 HYDRATE IV INFUSION ADD-ON: CPT

## 2018-02-21 PROCEDURE — 96375 TX/PRO/DX INJ NEW DRUG ADDON: CPT

## 2018-02-21 PROCEDURE — 81001 URINALYSIS AUTO W/SCOPE: CPT | Performed by: EMERGENCY MEDICINE

## 2018-02-21 RX ORDER — ONDANSETRON 2 MG/ML
4 INJECTION INTRAMUSCULAR; INTRAVENOUS
Status: COMPLETED | OUTPATIENT
Start: 2018-02-21 | End: 2018-02-21

## 2018-02-21 RX ORDER — DIPHENHYDRAMINE HYDROCHLORIDE 50 MG/ML
50 INJECTION, SOLUTION INTRAMUSCULAR; INTRAVENOUS ONCE
Status: COMPLETED | OUTPATIENT
Start: 2018-02-21 | End: 2018-02-21

## 2018-02-21 RX ORDER — KETOROLAC TROMETHAMINE 30 MG/ML
30 INJECTION, SOLUTION INTRAMUSCULAR; INTRAVENOUS
Status: COMPLETED | OUTPATIENT
Start: 2018-02-21 | End: 2018-02-21

## 2018-02-21 RX ORDER — HALOPERIDOL 5 MG/ML
2 INJECTION INTRAMUSCULAR
Status: COMPLETED | OUTPATIENT
Start: 2018-02-21 | End: 2018-02-21

## 2018-02-21 RX ORDER — CEPHALEXIN 250 MG/1
500 CAPSULE ORAL
Status: COMPLETED | OUTPATIENT
Start: 2018-02-21 | End: 2018-02-21

## 2018-02-21 RX ORDER — CEPHALEXIN 500 MG/1
500 CAPSULE ORAL 4 TIMES DAILY
Qty: 28 CAP | Refills: 0 | Status: SHIPPED | OUTPATIENT
Start: 2018-02-21 | End: 2018-02-28

## 2018-02-21 RX ADMIN — ONDANSETRON 4 MG: 2 INJECTION INTRAMUSCULAR; INTRAVENOUS at 05:46

## 2018-02-21 RX ADMIN — HALOPERIDOL LACTATE 2 MG: 5 INJECTION, SOLUTION INTRAMUSCULAR at 07:46

## 2018-02-21 RX ADMIN — KETOROLAC TROMETHAMINE 30 MG: 30 INJECTION, SOLUTION INTRAMUSCULAR at 05:46

## 2018-02-21 RX ADMIN — CEPHALEXIN 500 MG: 250 CAPSULE ORAL at 08:20

## 2018-02-21 RX ADMIN — DIPHENHYDRAMINE HYDROCHLORIDE 50 MG: 50 INJECTION, SOLUTION INTRAMUSCULAR; INTRAVENOUS at 07:43

## 2018-02-21 RX ADMIN — SODIUM CHLORIDE 1000 ML: 900 INJECTION, SOLUTION INTRAVENOUS at 05:46

## 2018-02-21 NOTE — ED TRIAGE NOTES
Patient has recently been admitted into the hospital with kidney problems. Patient states she has unrelieved generalized abdominal pain, flank pain, and nausea. Patient also states she has had diarrhea 5 times today.

## 2018-02-21 NOTE — ED NOTES
I have reviewed discharge instructions with the patient. The patient verbalized understanding. Patient armband removed and given to patient to take home. Patient was informed of the privacy risks if armband lost or stolen. Pt to lobby in wheelchair and assisted into daughters car. Pt with no complaints upon discharge and in no apparent distress.

## 2018-02-21 NOTE — ED PROVIDER NOTES
EMERGENCY DEPARTMENT HISTORY AND PHYSICAL EXAM    5:18 AM      Date: 2/21/2018  Patient Name: Durga Guajardo    History of Presenting Illness     Chief Complaint   Patient presents with    Nausea    Diarrhea    Abdominal Pain    Flank Pain         History Provided By: Patient    Chief Complaint: pain   Duration:  1 day  Timing:  Constant  Location: lower abd   Quality: Cramping  Severity: Severe  Modifying Factors: none  Associated Symptoms: HA, fatigue, diarrhea, nausea, poor appetite, dehydration       Additional History (Context): Durga Guajardo, a 56 y.o. Female, nonsmoker, non-alcohol drinker with h/o cirrhosis, with recent discharge from [address] [place] [address] 1 week ago after evaluation for flu, presents to the ED with constant, severe, cramping pain to the area across her lower abd x several days. Sx are  associated with a HA, fatigue, diarrhea, nausea, poor appetite and dehydration but not with frequency or  dysuria. No other complaints at this time. PCP: Angie Chang MD    Current Outpatient Prescriptions   Medication Sig Dispense Refill    cyclobenzaprine (FLEXERIL) 10 mg tablet Take 1 Tab by mouth three (3) times daily as needed.  lactulose (CHRONULAC) 10 gram/15 mL solution Take 45 mL by mouth three (3) times daily as needed. 1    ondansetron (ZOFRAN ODT) 4 mg disintegrating tablet Take 1 Tab by mouth every eight (8) hours as needed for Nausea. 10 Tab 0    sertraline (ZOLOFT) 50 mg tablet TAKE 1 TABLET BY MOUTH EVERY DAY  1    metFORMIN (GLUCOPHAGE) 500 mg tablet Take 500 mg by mouth two (2) times daily (with meals).  clotrimazole (LOTRIMIN) 1 % topical cream Apply a thin layer over affected area twice daily for up to 14 days. 15 g 2    gabapentin (NEURONTIN) 300 mg capsule Take 300 mg by mouth daily.          Past History     Past Medical History:  Past Medical History:   Diagnosis Date    Bilateral knee pain     Cirrhosis of liver ([address]     Depression     Diabetes ([facility] [address]     Diastolic hypertension     Fatty liver     Gastritis     Hypertension     Ill-defined condition     Inflammation of lymphatics     Liver disease     cirrhosis    Menorrhagia     HANKS (nonalcoholic steatohepatitis)     Nausea     Osteoarthritis     Right flank pain     RUQ abdominal pain     Sleep apnea     Does not use consistently    Thrombocytopenia (HCC)        Past Surgical History:  Past Surgical History:   Procedure Laterality Date    ENDOSCOPY, COLON, DIAGNOSTIC  5/20/2013    HX HYSTERECTOMY      HX OTHER SURGICAL      liver biopsy    HX OVARIAN CYST REMOVAL      Bilateral       Family History:  History reviewed. No pertinent family history. Social History:  Social History   Substance Use Topics    Smoking status: Never Smoker    Smokeless tobacco: Never Used    Alcohol use No       Allergies: Allergies   Allergen Reactions    Morphine Hives         Review of Systems     Review of Systems   Constitutional: Negative for fever. Poor appetite   Dehydration    HENT: Negative for trouble swallowing. Respiratory: Positive for cough. Negative for shortness of breath. Cardiovascular: Negative for chest pain. Gastrointestinal: Positive for abdominal pain, diarrhea, nausea and vomiting. Genitourinary: Negative for difficulty urinating, dysuria and frequency. Musculoskeletal: Negative for back pain and neck pain. Skin: Negative for wound. Neurological: Positive for headaches. Negative for syncope. Psychiatric/Behavioral: Negative for behavioral problems. All other systems reviewed and are negative. Physical Exam     Visit Vitals    /90    Pulse 84    Temp 98.2 °F (36.8 °C)    Resp 20    Ht 5' 4\" (1.626 m)    Wt 136.1 kg (300 lb)    LMP 04/25/2011    SpO2 98%    BMI 51.49 kg/m2       Physical Exam   Constitutional: She is oriented to person, place, and time. She appears well-developed. No distress.    Chronically ill-appearing, nad   HENT:   Head: Normocephalic and atraumatic. Eyes: EOM are normal.   Neck: Normal range of motion. Cardiovascular: Normal rate. Pulmonary/Chest: Effort normal and breath sounds normal. No respiratory distress. Abdominal: Soft. There is tenderness (generalized). Morbidly obese   Musculoskeletal: Normal range of motion. Mechanically stable   Neurological: She is alert and oriented to person, place, and time. No focal deficits noted   Psychiatric: Her behavior is normal.   Nursing note and vitals reviewed. Diagnostic Study Results     Labs -  Recent Results (from the past 12 hour(s))   CBC WITH AUTOMATED DIFF    Collection Time: 02/21/18  5:45 AM   Result Value Ref Range    WBC 2.7 (L) 4.6 - 13.2 K/uL    RBC 2.96 (L) 4.20 - 5.30 M/uL    HGB 10.6 (L) 12.0 - 16.0 g/dL    HCT 31.4 (L) 35.0 - 45.0 %    .1 (H) 74.0 - 97.0 FL    MCH 35.8 (H) 24.0 - 34.0 PG    MCHC 33.8 31.0 - 37.0 g/dL    RDW 16.8 (H) 11.6 - 14.5 %    PLATELET 30 (L) 153 - 420 K/uL    MPV 8.8 (L) 9.2 - 11.8 FL    NEUTROPHILS 49 40 - 73 %    LYMPHOCYTES 35 21 - 52 %    MONOCYTES 12 (H) 3 - 10 %    EOSINOPHILS 3 0 - 5 %    BASOPHILS 1 0 - 2 %    ABS. NEUTROPHILS 1.3 (L) 1.8 - 8.0 K/UL    ABS. LYMPHOCYTES 0.9 0.9 - 3.6 K/UL    ABS. MONOCYTES 0.3 0.05 - 1.2 K/UL    ABS. EOSINOPHILS 0.1 0.0 - 0.4 K/UL    ABS.  BASOPHILS 0.0 0.0 - 0.06 K/UL    DF AUTOMATED     METABOLIC PANEL, COMPREHENSIVE    Collection Time: 02/21/18  5:45 AM   Result Value Ref Range    Sodium 139 136 - 145 mmol/L    Potassium 3.9 3.5 - 5.5 mmol/L    Chloride 103 100 - 108 mmol/L    CO2 28 21 - 32 mmol/L    Anion gap 8 3.0 - 18 mmol/L    Glucose 137 (H) 74 - 99 mg/dL    BUN 4 (L) 7.0 - 18 MG/DL    Creatinine 0.71 0.6 - 1.3 MG/DL    BUN/Creatinine ratio 6 (L) 12 - 20      GFR est AA >60 >60 ml/min/1.73m2    GFR est non-AA >60 >60 ml/min/1.73m2    Calcium 7.9 (L) 8.5 - 10.1 MG/DL    Bilirubin, total 5.4 (H) 0.2 - 1.0 MG/DL    ALT (SGPT) 28 13 - 56 U/L    AST (SGOT) 71 (H) 15 - 37 U/L Alk. phosphatase 104 45 - 117 U/L    Protein, total 6.8 6.4 - 8.2 g/dL    Albumin 2.7 (L) 3.4 - 5.0 g/dL    Globulin 4.1 (H) 2.0 - 4.0 g/dL    A-G Ratio 0.7 (L) 0.8 - 1.7     LIPASE    Collection Time: 02/21/18  5:45 AM   Result Value Ref Range    Lipase 212 73 - 393 U/L       Medical Decision Making   I am the first provider for this patient. I reviewed the vital signs, available nursing notes, past medical history, past surgical history, family history and social history. Vital Signs-Reviewed the patient's vital signs. Pulse Oximetry Analysis -  98% on room air (Interpretation) Non-hypoxic     Records Reviewed: Nursing Notes and Old Medical Records (Time of Review: 5:18 AM)    ED Course: Progress Notes, Reevaluation, and Consults:      Provider Notes (Medical Decision Making): MDM  Number of Diagnoses or Management Options  Abdominal pain, vomiting, and diarrhea:   Diagnosis management comments: 55 yo CF with PMHx HTN, DM presents by EMS with abdominal pain, diarrhea. Pt recently in ED for same, but had fever and was admitted. Examination somewhat limited due to pt body habitus but significant for generalized abdominal tenderness. Given extensive recent unremarkable work-up, including CT a/p, consider viral illness. Will evaluate for acute process. 6:30 AM  labwork unremarkable. UA pending. Pt doing ok. Care to be transferred to Dr. Gerardo Nguyen at end of shift, pending results and clinical re-evaluation. Amount and/or Complexity of Data Reviewed  Clinical lab tests: ordered and reviewed  Tests in the radiology section of CPT®: reviewed  Obtain history from someone other than the patient: yes  Review and summarize past medical records: yes  Independent visualization of images, tracings, or specimens: yes    Patient Progress  Patient progress: stable      Procedures: Procedures      Diagnosis     Clinical Impression:   1.  Abdominal pain, vomiting, and diarrhea        Disposition: Pending    Follow-up Information     None           Patient's Medications   Start Taking    No medications on file   Continue Taking    CLOTRIMAZOLE (LOTRIMIN) 1 % TOPICAL CREAM    Apply a thin layer over affected area twice daily for up to 14 days. CYCLOBENZAPRINE (FLEXERIL) 10 MG TABLET    Take 1 Tab by mouth three (3) times daily as needed. GABAPENTIN (NEURONTIN) 300 MG CAPSULE    Take 300 mg by mouth daily. LACTULOSE (CHRONULAC) 10 GRAM/15 ML SOLUTION    Take 45 mL by mouth three (3) times daily as needed. METFORMIN (GLUCOPHAGE) 500 MG TABLET    Take 500 mg by mouth two (2) times daily (with meals). ONDANSETRON (ZOFRAN ODT) 4 MG DISINTEGRATING TABLET    Take 1 Tab by mouth every eight (8) hours as needed for Nausea. SERTRALINE (ZOLOFT) 50 MG TABLET    TAKE 1 TABLET BY MOUTH EVERY DAY   These Medications have changed    No medications on file   Stop Taking    MECLIZINE (ANTIVERT) 25 MG TABLET    Take  by mouth three (3) times daily as needed. TRAMADOL (ULTRAM) 50 MG TABLET    Take 1 Tab by mouth every six (6) hours as needed for Pain. Max Daily Amount: 200 mg.     _______________________________    Attestations:  Scribe Attestation     Janie Beverly acting as a scribe for and in the presence of Teresita Loera MD      February 21, 2018 at [facility]       Provider Attestation:      I personally performed the services described in the documentation, reviewed the documentation, as recorded by the scribe in my presence, and it accurately and completely records my words and actions. February 21, 2018 at 5:18 AM - Teresita Loera MD    _______________________________    6:23 AM : Pt care transferred to Dr. David Gambino  ,ED provider. History of patient complaint(s), available diagnostic reports and current treatment plan has been discussed thoroughly. Bedside rounding on patient occured : yes .   Intended disposition of patient : TBD  Pending diagnostics reports and/or labs (please list): AYLEEN

## 2018-02-21 NOTE — ED NOTES
Verbal shift change report given to Danae Nguyen RN (oncoming nurse) by Ashli Arnold RN (offgoing nurse). Report included the following information SBAR and MAR.

## 2018-02-21 NOTE — ED NOTES
7:36 :Pt care assumed from Dr. Loretta Escamilla, ED provider. Pt complaint(s), current treatment plan, progression and available diagnostic results have been discussed thoroughly. Rounding occurred: yes  Intended Disposition: Home   Pending diagnostic reports and/or labs (please list): Pending UA    ED Course   Value Comment By Time    Pt given 2mg haldol and 50 mg IV benadryl for reported headache, nausea and abd cramping Stephany Gao, DO 02/21 0743   WBC: (!) 2.7 Non specific neutropenia, will alert pt to this and have her follow with her PCP Stephany Gao, DO 02/21 0743   Bilirubin, total: (!) 5.4 (Reviewed) Stephany Gao, DO 02/21 0745       8329: Reassessed pt. She states her main reason for coming to ED was ongoing generalized weakness since discharge from recent admission as well as diffuse abd cramping, R flank pain, and HA.    0753: Pt with nitrate-positive UTI; will give first dose fo Keflex in ED and send home with prescription. After pt received her Benadryl and Haldol she told her nurse her ride was here and she was ready to go home. Patient has no new complaints, changes, or physical findings. Diagnostic studies were reviewed with the patient. Pt's  And/or family's questions and concerns were addressed. Care plan was outlined, including follow-up with PCP/specialist and return precautions were discussed. Patient is felt to be stable for discharge at this time. Scribe Attestation     Gaby Narvaez acting as a scribe for and in the presence of Stephany Gao DO      February 21, 2018 at 7:55 AM       Provider Attestation:      I personally performed the services described in the documentation, reviewed the documentation, as recorded by the scribe in my presence, and it accurately and completely records my words and actions.  February 21, 2018 at 7:55 AM - Stephany Gao DO

## 2018-02-22 ENCOUNTER — HOME CARE VISIT (OUTPATIENT)
Dept: SCHEDULING | Facility: HOME HEALTH | Age: 58
End: 2018-02-22
Payer: MEDICAID

## 2018-02-23 ENCOUNTER — HOME CARE VISIT (OUTPATIENT)
Dept: HOME HEALTH SERVICES | Facility: HOME HEALTH | Age: 58
End: 2018-02-23
Payer: MEDICAID

## 2018-02-26 ENCOUNTER — HOME CARE VISIT (OUTPATIENT)
Dept: SCHEDULING | Facility: HOME HEALTH | Age: 58
End: 2018-02-26
Payer: MEDICAID

## 2018-02-26 VITALS
HEART RATE: 86 BPM | DIASTOLIC BLOOD PRESSURE: 68 MMHG | OXYGEN SATURATION: 97 % | SYSTOLIC BLOOD PRESSURE: 130 MMHG | TEMPERATURE: 98.1 F

## 2018-02-27 ENCOUNTER — HOME CARE VISIT (OUTPATIENT)
Dept: HOME HEALTH SERVICES | Facility: HOME HEALTH | Age: 58
End: 2018-02-27
Payer: MEDICAID

## 2018-02-28 ENCOUNTER — HOME CARE VISIT (OUTPATIENT)
Dept: SCHEDULING | Facility: HOME HEALTH | Age: 58
End: 2018-02-28
Payer: MEDICAID

## 2018-02-28 VITALS
TEMPERATURE: 98.2 F | DIASTOLIC BLOOD PRESSURE: 65 MMHG | SYSTOLIC BLOOD PRESSURE: 130 MMHG | OXYGEN SATURATION: 98 % | HEART RATE: 83 BPM

## 2018-02-28 PROCEDURE — G0157 HHC PT ASSISTANT EA 15: HCPCS

## 2018-03-01 ENCOUNTER — HOME CARE VISIT (OUTPATIENT)
Dept: SCHEDULING | Facility: HOME HEALTH | Age: 58
End: 2018-03-01
Payer: MEDICAID

## 2018-03-01 VITALS
HEART RATE: 88 BPM | DIASTOLIC BLOOD PRESSURE: 68 MMHG | TEMPERATURE: 98 F | RESPIRATION RATE: 20 BRPM | OXYGEN SATURATION: 98 % | SYSTOLIC BLOOD PRESSURE: 142 MMHG

## 2018-03-01 VITALS
DIASTOLIC BLOOD PRESSURE: 78 MMHG | OXYGEN SATURATION: 98 % | HEART RATE: 95 BPM | TEMPERATURE: 99.1 F | SYSTOLIC BLOOD PRESSURE: 142 MMHG

## 2018-03-01 PROCEDURE — G0496 LPN CARE TRAIN/EDU IN HH: HCPCS

## 2018-03-01 PROCEDURE — G0157 HHC PT ASSISTANT EA 15: HCPCS

## 2018-03-01 PROCEDURE — G0152 HHCP-SERV OF OT,EA 15 MIN: HCPCS

## 2018-03-02 VITALS
HEART RATE: 91 BPM | DIASTOLIC BLOOD PRESSURE: 70 MMHG | SYSTOLIC BLOOD PRESSURE: 115 MMHG | TEMPERATURE: 98.7 F | OXYGEN SATURATION: 97 %

## 2018-03-05 ENCOUNTER — HOME CARE VISIT (OUTPATIENT)
Dept: SCHEDULING | Facility: HOME HEALTH | Age: 58
End: 2018-03-05
Payer: MEDICAID

## 2018-03-05 PROCEDURE — G0157 HHC PT ASSISTANT EA 15: HCPCS

## 2018-03-06 ENCOUNTER — HOME CARE VISIT (OUTPATIENT)
Dept: SCHEDULING | Facility: HOME HEALTH | Age: 58
End: 2018-03-06
Payer: MEDICAID

## 2018-03-06 VITALS
RESPIRATION RATE: 20 BRPM | TEMPERATURE: 98.3 F | OXYGEN SATURATION: 98 % | SYSTOLIC BLOOD PRESSURE: 124 MMHG | HEART RATE: 80 BPM | DIASTOLIC BLOOD PRESSURE: 66 MMHG

## 2018-03-06 VITALS
DIASTOLIC BLOOD PRESSURE: 60 MMHG | SYSTOLIC BLOOD PRESSURE: 132 MMHG | OXYGEN SATURATION: 98 % | TEMPERATURE: 99.1 F | HEART RATE: 89 BPM

## 2018-03-06 PROCEDURE — G0496 LPN CARE TRAIN/EDU IN HH: HCPCS

## 2018-03-07 ENCOUNTER — HOME CARE VISIT (OUTPATIENT)
Dept: SCHEDULING | Facility: HOME HEALTH | Age: 58
End: 2018-03-07
Payer: MEDICAID

## 2018-03-07 VITALS
SYSTOLIC BLOOD PRESSURE: 135 MMHG | HEART RATE: 88 BPM | OXYGEN SATURATION: 97 % | TEMPERATURE: 99.2 F | DIASTOLIC BLOOD PRESSURE: 55 MMHG

## 2018-03-07 PROCEDURE — G0157 HHC PT ASSISTANT EA 15: HCPCS

## 2018-03-08 ENCOUNTER — HOME CARE VISIT (OUTPATIENT)
Dept: SCHEDULING | Facility: HOME HEALTH | Age: 58
End: 2018-03-08
Payer: MEDICAID

## 2018-03-08 VITALS
SYSTOLIC BLOOD PRESSURE: 142 MMHG | HEART RATE: 92 BPM | DIASTOLIC BLOOD PRESSURE: 70 MMHG | RESPIRATION RATE: 20 BRPM | OXYGEN SATURATION: 98 % | TEMPERATURE: 98.2 F

## 2018-03-08 PROCEDURE — G0152 HHCP-SERV OF OT,EA 15 MIN: HCPCS

## 2018-03-08 PROCEDURE — G0494 LPN CARE EA 15MIN HH/HOSPICE: HCPCS

## 2018-03-09 ENCOUNTER — HOME CARE VISIT (OUTPATIENT)
Dept: SCHEDULING | Facility: HOME HEALTH | Age: 58
End: 2018-03-09
Payer: MEDICAID

## 2018-03-09 VITALS
SYSTOLIC BLOOD PRESSURE: 148 MMHG | DIASTOLIC BLOOD PRESSURE: 68 MMHG | HEART RATE: 81 BPM | TEMPERATURE: 98.6 F | OXYGEN SATURATION: 98 %

## 2018-03-09 VITALS
TEMPERATURE: 97.8 F | HEART RATE: 87 BPM | OXYGEN SATURATION: 98 % | SYSTOLIC BLOOD PRESSURE: 130 MMHG | DIASTOLIC BLOOD PRESSURE: 62 MMHG

## 2018-03-09 PROCEDURE — G0152 HHCP-SERV OF OT,EA 15 MIN: HCPCS

## 2018-03-12 ENCOUNTER — HOME CARE VISIT (OUTPATIENT)
Dept: SCHEDULING | Facility: HOME HEALTH | Age: 58
End: 2018-03-12
Payer: MEDICAID

## 2018-03-12 PROCEDURE — G0157 HHC PT ASSISTANT EA 15: HCPCS

## 2018-03-13 VITALS
OXYGEN SATURATION: 94 % | HEART RATE: 69 BPM | SYSTOLIC BLOOD PRESSURE: 142 MMHG | DIASTOLIC BLOOD PRESSURE: 68 MMHG | TEMPERATURE: 99.3 F

## 2018-03-14 ENCOUNTER — HOME CARE VISIT (OUTPATIENT)
Dept: SCHEDULING | Facility: HOME HEALTH | Age: 58
End: 2018-03-14
Payer: MEDICAID

## 2018-03-14 VITALS
SYSTOLIC BLOOD PRESSURE: 130 MMHG | TEMPERATURE: 98.5 F | OXYGEN SATURATION: 97 % | HEART RATE: 84 BPM | DIASTOLIC BLOOD PRESSURE: 78 MMHG

## 2018-03-14 VITALS
HEART RATE: 91 BPM | SYSTOLIC BLOOD PRESSURE: 148 MMHG | OXYGEN SATURATION: 97 % | DIASTOLIC BLOOD PRESSURE: 84 MMHG | TEMPERATURE: 98.3 F

## 2018-03-14 PROCEDURE — G0151 HHCP-SERV OF PT,EA 15 MIN: HCPCS

## 2018-03-14 PROCEDURE — G0152 HHCP-SERV OF OT,EA 15 MIN: HCPCS

## 2018-03-19 ENCOUNTER — HOME CARE VISIT (OUTPATIENT)
Dept: SCHEDULING | Facility: HOME HEALTH | Age: 58
End: 2018-03-19
Payer: MEDICAID

## 2018-03-19 VITALS
SYSTOLIC BLOOD PRESSURE: 144 MMHG | HEART RATE: 88 BPM | TEMPERATURE: 98.7 F | DIASTOLIC BLOOD PRESSURE: 78 MMHG | OXYGEN SATURATION: 96 %

## 2018-03-19 PROCEDURE — G0157 HHC PT ASSISTANT EA 15: HCPCS

## 2018-03-21 ENCOUNTER — HOME CARE VISIT (OUTPATIENT)
Dept: SCHEDULING | Facility: HOME HEALTH | Age: 58
End: 2018-03-21
Payer: MEDICAID

## 2018-03-21 PROCEDURE — G0157 HHC PT ASSISTANT EA 15: HCPCS

## 2018-03-22 VITALS
OXYGEN SATURATION: 97 % | DIASTOLIC BLOOD PRESSURE: 80 MMHG | TEMPERATURE: 98.9 F | HEART RATE: 85 BPM | SYSTOLIC BLOOD PRESSURE: 138 MMHG

## 2018-03-28 ENCOUNTER — HOME CARE VISIT (OUTPATIENT)
Dept: SCHEDULING | Facility: HOME HEALTH | Age: 58
End: 2018-03-28
Payer: MEDICAID

## 2018-03-28 PROCEDURE — G0151 HHCP-SERV OF PT,EA 15 MIN: HCPCS

## 2018-04-27 ENCOUNTER — APPOINTMENT (OUTPATIENT)
Dept: CT IMAGING | Age: 58
End: 2018-04-27
Attending: PHYSICIAN ASSISTANT
Payer: MEDICAID

## 2018-04-27 ENCOUNTER — HOSPITAL ENCOUNTER (EMERGENCY)
Age: 58
Discharge: HOME | End: 2018-04-27
Attending: EMERGENCY MEDICINE
Payer: MEDICAID

## 2018-04-27 ENCOUNTER — APPOINTMENT (OUTPATIENT)
Dept: GENERAL RADIOLOGY | Age: 58
End: 2018-04-27
Attending: PHYSICIAN ASSISTANT
Payer: MEDICAID

## 2018-04-27 VITALS
SYSTOLIC BLOOD PRESSURE: 130 MMHG | RESPIRATION RATE: 14 BRPM | HEART RATE: 88 BPM | BODY MASS INDEX: 58.36 KG/M2 | OXYGEN SATURATION: 97 % | WEIGHT: 293 LBS | TEMPERATURE: 98.7 F | DIASTOLIC BLOOD PRESSURE: 76 MMHG

## 2018-04-27 DIAGNOSIS — R60.1 ANASARCA: ICD-10-CM

## 2018-04-27 DIAGNOSIS — D69.6 THROMBOCYTOPENIA (HCC): ICD-10-CM

## 2018-04-27 DIAGNOSIS — K74.60 CIRRHOSIS OF LIVER WITHOUT ASCITES, UNSPECIFIED HEPATIC CIRRHOSIS TYPE (HCC): ICD-10-CM

## 2018-04-27 DIAGNOSIS — R10.84 ABDOMINAL PAIN, GENERALIZED: Primary | ICD-10-CM

## 2018-04-27 LAB
ALBUMIN SERPL-MCNC: 2.6 G/DL (ref 3.4–5)
ALBUMIN/GLOB SERPL: 0.5 {RATIO} (ref 0.8–1.7)
ALP SERPL-CCNC: 178 U/L (ref 45–117)
ALT SERPL-CCNC: 35 U/L (ref 13–56)
ANION GAP SERPL CALC-SCNC: 6 MMOL/L (ref 3–18)
APPEARANCE UR: CLEAR
APTT PPP: 38.5 SEC (ref 23–36.4)
AST SERPL-CCNC: 65 U/L (ref 15–37)
BACTERIA URNS QL MICRO: ABNORMAL /HPF
BASOPHILS # BLD: 0 K/UL (ref 0–0.06)
BASOPHILS NFR BLD: 1 % (ref 0–2)
BILIRUB SERPL-MCNC: 6.7 MG/DL (ref 0.2–1)
BILIRUB UR QL: ABNORMAL
BNP SERPL-MCNC: 234 PG/ML (ref 0–900)
BUN SERPL-MCNC: 7 MG/DL (ref 7–18)
BUN/CREAT SERPL: 9 (ref 12–20)
CALCIUM SERPL-MCNC: 8.3 MG/DL (ref 8.5–10.1)
CHLORIDE SERPL-SCNC: 104 MMOL/L (ref 100–108)
CK MB CFR SERPL CALC: 2.4 % (ref 0–4)
CK MB SERPL-MCNC: 4.3 NG/ML (ref 5–25)
CK SERPL-CCNC: 180 U/L (ref 26–192)
CO2 SERPL-SCNC: 29 MMOL/L (ref 21–32)
COLOR UR: YELLOW
CREAT SERPL-MCNC: 0.76 MG/DL (ref 0.6–1.3)
DIFFERENTIAL METHOD BLD: ABNORMAL
EOSINOPHIL # BLD: 0.2 K/UL (ref 0–0.4)
EOSINOPHIL NFR BLD: 4 % (ref 0–5)
EPITH CASTS URNS QL MICRO: ABNORMAL /LPF (ref 0–5)
ERYTHROCYTE [DISTWIDTH] IN BLOOD BY AUTOMATED COUNT: 15.5 % (ref 11.6–14.5)
GLOBULIN SER CALC-MCNC: 5 G/DL (ref 2–4)
GLUCOSE SERPL-MCNC: 170 MG/DL (ref 74–99)
GLUCOSE UR STRIP.AUTO-MCNC: 100 MG/DL
HCT VFR BLD AUTO: 37.2 % (ref 35–45)
HGB BLD-MCNC: 12.4 G/DL (ref 12–16)
HGB UR QL STRIP: NEGATIVE
INR PPP: 1.8 (ref 0.8–1.2)
KETONES UR QL STRIP.AUTO: ABNORMAL MG/DL
LEUKOCYTE ESTERASE UR QL STRIP.AUTO: NEGATIVE
LIPASE SERPL-CCNC: 211 U/L (ref 73–393)
LYMPHOCYTES # BLD: 0.9 K/UL (ref 0.9–3.6)
LYMPHOCYTES NFR BLD: 22 % (ref 21–52)
MCH RBC QN AUTO: 35.4 PG (ref 24–34)
MCHC RBC AUTO-ENTMCNC: 33.3 G/DL (ref 31–37)
MCV RBC AUTO: 106.3 FL (ref 74–97)
MONOCYTES # BLD: 0.5 K/UL (ref 0.05–1.2)
MONOCYTES NFR BLD: 13 % (ref 3–10)
NEUTS SEG # BLD: 2.4 K/UL (ref 1.8–8)
NEUTS SEG NFR BLD: 60 % (ref 40–73)
NITRITE UR QL STRIP.AUTO: NEGATIVE
PH UR STRIP: 7 [PH] (ref 5–8)
PLATELET # BLD AUTO: 52 K/UL (ref 135–420)
PMV BLD AUTO: 9.9 FL (ref 9.2–11.8)
POTASSIUM SERPL-SCNC: 3.9 MMOL/L (ref 3.5–5.5)
PROT SERPL-MCNC: 7.6 G/DL (ref 6.4–8.2)
PROT UR STRIP-MCNC: 30 MG/DL
PROTHROMBIN TIME: 20.4 SEC (ref 11.5–15.2)
RBC # BLD AUTO: 3.5 M/UL (ref 4.2–5.3)
RBC #/AREA URNS HPF: ABNORMAL /HPF (ref 0–5)
SODIUM SERPL-SCNC: 139 MMOL/L (ref 136–145)
SP GR UR REFRACTOMETRY: 1.02 (ref 1–1.03)
TROPONIN I SERPL-MCNC: 0.03 NG/ML (ref 0–0.04)
UROBILINOGEN UR QL STRIP.AUTO: >8 EU/DL (ref 0.2–1)
WBC # BLD AUTO: 3.9 K/UL (ref 4.6–13.2)
WBC URNS QL MICRO: ABNORMAL /HPF (ref 0–4)

## 2018-04-27 PROCEDURE — 99284 EMERGENCY DEPT VISIT MOD MDM: CPT

## 2018-04-27 PROCEDURE — 74011250636 HC RX REV CODE- 250/636: Performed by: PHYSICIAN ASSISTANT

## 2018-04-27 PROCEDURE — 83690 ASSAY OF LIPASE: CPT | Performed by: PHYSICIAN ASSISTANT

## 2018-04-27 PROCEDURE — 83880 ASSAY OF NATRIURETIC PEPTIDE: CPT | Performed by: PHYSICIAN ASSISTANT

## 2018-04-27 PROCEDURE — 93005 ELECTROCARDIOGRAM TRACING: CPT

## 2018-04-27 PROCEDURE — 96375 TX/PRO/DX INJ NEW DRUG ADDON: CPT

## 2018-04-27 PROCEDURE — 96374 THER/PROPH/DIAG INJ IV PUSH: CPT

## 2018-04-27 PROCEDURE — 85025 COMPLETE CBC W/AUTO DIFF WBC: CPT | Performed by: PHYSICIAN ASSISTANT

## 2018-04-27 PROCEDURE — 85730 THROMBOPLASTIN TIME PARTIAL: CPT | Performed by: EMERGENCY MEDICINE

## 2018-04-27 PROCEDURE — 74011636320 HC RX REV CODE- 636/320: Performed by: EMERGENCY MEDICINE

## 2018-04-27 PROCEDURE — 71045 X-RAY EXAM CHEST 1 VIEW: CPT

## 2018-04-27 PROCEDURE — 85610 PROTHROMBIN TIME: CPT | Performed by: PHYSICIAN ASSISTANT

## 2018-04-27 PROCEDURE — 80053 COMPREHEN METABOLIC PANEL: CPT | Performed by: PHYSICIAN ASSISTANT

## 2018-04-27 PROCEDURE — 82550 ASSAY OF CK (CPK): CPT | Performed by: PHYSICIAN ASSISTANT

## 2018-04-27 PROCEDURE — 74177 CT ABD & PELVIS W/CONTRAST: CPT

## 2018-04-27 PROCEDURE — 81001 URINALYSIS AUTO W/SCOPE: CPT | Performed by: PHYSICIAN ASSISTANT

## 2018-04-27 RX ORDER — KETOROLAC TROMETHAMINE 30 MG/ML
30 INJECTION, SOLUTION INTRAMUSCULAR; INTRAVENOUS
Status: COMPLETED | OUTPATIENT
Start: 2018-04-27 | End: 2018-04-27

## 2018-04-27 RX ORDER — MORPHINE SULFATE 10 MG/ML
4 INJECTION, SOLUTION INTRAMUSCULAR; INTRAVENOUS
Status: COMPLETED | OUTPATIENT
Start: 2018-04-27 | End: 2018-04-27

## 2018-04-27 RX ORDER — DIPHENHYDRAMINE HYDROCHLORIDE 50 MG/ML
12.5 INJECTION, SOLUTION INTRAMUSCULAR; INTRAVENOUS
Status: COMPLETED | OUTPATIENT
Start: 2018-04-27 | End: 2018-04-27

## 2018-04-27 RX ORDER — OXYCODONE AND ACETAMINOPHEN 5; 325 MG/1; MG/1
1 TABLET ORAL
Qty: 10 TAB | Refills: 0 | Status: SHIPPED | OUTPATIENT
Start: 2018-04-27 | End: 2018-06-06

## 2018-04-27 RX ORDER — LORAZEPAM 2 MG/ML
1 INJECTION INTRAMUSCULAR
Status: COMPLETED | OUTPATIENT
Start: 2018-04-27 | End: 2018-04-27

## 2018-04-27 RX ADMIN — IOPAMIDOL 93 ML: 612 INJECTION, SOLUTION INTRAVENOUS at 17:24

## 2018-04-27 RX ADMIN — LORAZEPAM 1 MG: 2 INJECTION INTRAMUSCULAR at 17:02

## 2018-04-27 RX ADMIN — MORPHINE SULFATE 4 MG: 10 INJECTION INTRAMUSCULAR; INTRAVENOUS; SUBCUTANEOUS at 16:30

## 2018-04-27 RX ADMIN — DIPHENHYDRAMINE HYDROCHLORIDE 12.5 MG: 50 INJECTION, SOLUTION INTRAMUSCULAR; INTRAVENOUS at 16:30

## 2018-04-27 RX ADMIN — KETOROLAC TROMETHAMINE 30 MG: 30 INJECTION, SOLUTION INTRAMUSCULAR at 17:02

## 2018-04-27 NOTE — ED PROVIDER NOTES
EMERGENCY DEPARTMENT HISTORY AND PHYSICAL EXAM    4:24 PM      Date: 4/27/2018  Patient Name: Elisa Pérez    History of Presenting Illness     Chief Complaint   Patient presents with    Abdominal Pain         History Provided By: Patient    Chief Complaint: abdominal pain   Duration: 2 Weeks  Timing:  Worsening  Location: generalized   Quality: Burning  Severity: 8 out of 10  Modifying Factors:   Associated Symptoms: swelling      Additional History (Context): Elisa Pérez is a 56 y.o. female with diabetes, hypertension and cirrhosis, thrombocytopenia  who presents with worsening abdominal pain, swelling, and weight gain. Symptoms intermittent over the past months but worse over the past 1-2 weeks. She has seen Dr. Casa Mejia and Dr. Rebeca Stinson, her GI nd hematologist, who have discussed and recommended that she come to ER for admit. THey are concerned about worsening labs and worsening ascites, according to patient. She has swelling of her abdomen and legs. She confirms difficulty eating and shortness of breath. Denies chest pain. She is very anxious and in pain today. She has been having pain in the right arm but states that her blood does not clot. She denies nausea, vomiting, fevers. She does not drink, this wsa not alcohol induced. She denies taking any pain medications. She was having bleeding from the veins around her navel over the past few months. She has never received a paracentesis. PCP: Mahi Rangel MD    Current Outpatient Prescriptions   Medication Sig Dispense Refill    oxyCODONE-acetaminophen (PERCOCET) 5-325 mg per tablet Take 1 Tab by mouth every four (4) hours as needed for Pain. Max Daily Amount: 6 Tabs. 10 Tab 0    cyclobenzaprine (FLEXERIL) 10 mg tablet Take 1 Tab by mouth three (3) times daily as needed.  lactulose (CHRONULAC) 10 gram/15 mL solution Take 45 mL by mouth three (3) times daily as needed.   1    ondansetron (ZOFRAN ODT) 4 mg disintegrating tablet Take 1 Tab by mouth every eight (8) hours as needed for Nausea. 10 Tab 0    sertraline (ZOLOFT) 50 mg tablet TAKE 1 TABLET BY MOUTH EVERY DAY  1    metFORMIN (GLUCOPHAGE) 500 mg tablet Take 500 mg by mouth two (2) times daily (with meals).  clotrimazole (LOTRIMIN) 1 % topical cream Apply a thin layer over affected area twice daily for up to 14 days. 15 g 2    gabapentin (NEURONTIN) 300 mg capsule Take 300 mg by mouth daily. Past History     Past Medical History:  Past Medical History:   Diagnosis Date    Bilateral knee pain     Cir[facility]osis of [facility] ([facility] [address]     Depression     Diabetes ([facility] [address]     Diastolic hypertension     Fatty liver     Gastritis     Hypertension     Ill-defined condition     Inflammation of lymphatics     Liver disease     cirrhosis    Menorrhagia     HANKS (nonalcoholic steatohepatitis)     Nausea     Osteoarthritis     Right flank pain     RUQ abdominal pain     Sleep apnea     Does not use consistently    Thrombocytopenia (HCC)        Past Surgical History:  Past Surgical History:   Procedure Laterality Date    ENDOSCOPY, COLON, DIAGNOSTIC  5/20/2013    HX HYSTERECTOMY      HX OTHER SURGICAL      liver biopsy    HX OVARIAN CYST REMOVAL      Bilateral       Family History:  History reviewed. No pertinent family history. Social History:  Social History   Substance Use Topics    Smoking status: Never Smoker    Smokeless tobacco: Never Used    Alcohol use No       Allergies: Allergies   Allergen Reactions    Morphine Hives         Review of Systems       Review of Systems   Constitutional: Negative for fever. HENT: Negative for facial swelling. Eyes: Negative for visual disturbance. Respiratory: Positive for shortness of breath. Negative for cough. Cardiovascular: Positive for leg swelling. Negative for chest pain. Gastrointestinal: Positive for abdominal distention and abdominal pain. Negative for blood in stool, constipation, diarrhea, nausea and vomiting. Genitourinary: Negative for dysuria. Musculoskeletal: Negative for neck pain. Skin: Negative for rash. Neurological: Negative for dizziness. Psychiatric/Behavioral: Negative for confusion. The patient is nervous/anxious. All other systems reviewed and are negative. Physical Exam     Visit Vitals    /71 (BP 1 Location: Right arm, BP Patient Position: At rest)    Pulse 95    Temp 98.7 °F (37.1 °C)    Resp 15    Wt 154.2 kg (340 lb)    LMP 04/25/2011    SpO2 99%    BMI 58.36 kg/m2         Physical Exam   Constitutional: She is oriented to person, place, and time. She appears well-developed and well-nourished. She appears distressed. Morbidly obese    HENT:   Head: Normocephalic and atraumatic. Eyes: Conjunctivae are normal.   Neck: Normal range of motion. Cardiovascular: Normal rate, regular rhythm and normal heart sounds. Pulmonary/Chest: Effort normal and breath sounds normal.   Abdominal: Bowel sounds are normal. She exhibits no distension. There is tenderness. Edematous abdominal tissue, no obvious distention   Musculoskeletal: Normal range of motion. Lower extremity 1+ pitting edema bilateral    Neurological: She is alert and oriented to person, place, and time. Skin: Skin is warm and dry. She is not diaphoretic. Psychiatric: She has a normal mood and affect. Nursing note and vitals reviewed.         Diagnostic Study Results     Labs -  Recent Results (from the past 12 hour(s))   URINALYSIS W/ RFLX MICROSCOPIC    Collection Time: 04/27/18  3:47 PM   Result Value Ref Range    Color YELLOW      Appearance CLEAR      Specific gravity 1.020 1.003 - 1.030      pH (UA) 7.0 5.0 - 8.0      Protein 30 (A) NEG mg/dL    Glucose 100 (A) NEG mg/dL    Ketone TRACE (A) NEG mg/dL    Bilirubin MODERATE (A) NEG      Blood NEGATIVE  NEG      Urobilinogen >8.0 (H) 0.2 - 1.0 EU/dL    Nitrites NEGATIVE  NEG      Leukocyte Esterase NEGATIVE  NEG     URINE MICROSCOPIC ONLY    Collection Time: 04/27/18  3:47 PM   Result Value Ref Range    WBC 0 to 3 0 - 4 /hpf    RBC NONE 0 - 5 /hpf    Epithelial cells 1+ 0 - 5 /lpf    Bacteria FEW (A) NEG /hpf   CBC WITH AUTOMATED DIFF    Collection Time: 04/27/18  4:14 PM   Result Value Ref Range    WBC 3.9 (L) 4.6 - 13.2 K/uL    RBC 3.50 (L) 4.20 - 5.30 M/uL    HGB 12.4 12.0 - 16.0 g/dL    HCT 37.2 35.0 - 45.0 %    .3 (H) 74.0 - 97.0 FL    MCH 35.4 (H) 24.0 - 34.0 PG    MCHC 33.3 31.0 - 37.0 g/dL    RDW 15.5 (H) 11.6 - 14.5 %    PLATELET 52 (L) 137 - 420 K/uL    MPV 9.9 9.2 - 11.8 FL    NEUTROPHILS 60 40 - 73 %    LYMPHOCYTES 22 21 - 52 %    MONOCYTES 13 (H) 3 - 10 %    EOSINOPHILS 4 0 - 5 %    BASOPHILS 1 0 - 2 %    ABS. NEUTROPHILS 2.4 1.8 - 8.0 K/UL    ABS. LYMPHOCYTES 0.9 0.9 - 3.6 K/UL    ABS. MONOCYTES 0.5 0.05 - 1.2 K/UL    ABS. EOSINOPHILS 0.2 0.0 - 0.4 K/UL    ABS. BASOPHILS 0.0 0.0 - 0.06 K/UL    DF AUTOMATED     LIPASE    Collection Time: 04/27/18  4:14 PM   Result Value Ref Range    Lipase 211 73 - 317 U/L   METABOLIC PANEL, COMPREHENSIVE    Collection Time: 04/27/18  4:14 PM   Result Value Ref Range    Sodium 139 136 - 145 mmol/L    Potassium 3.9 3.5 - 5.5 mmol/L    Chloride 104 100 - 108 mmol/L    CO2 29 21 - 32 mmol/L    Anion gap 6 3.0 - 18 mmol/L    Glucose 170 (H) 74 - 99 mg/dL    BUN 7 7.0 - 18 MG/DL    Creatinine 0.76 0.6 - 1.3 MG/DL    BUN/Creatinine ratio 9 (L) 12 - 20      GFR est AA >60 >60 ml/min/1.73m2    GFR est non-AA >60 >60 ml/min/1.73m2    Calcium 8.3 (L) 8.5 - 10.1 MG/DL    Bilirubin, total 6.7 (H) 0.2 - 1.0 MG/DL    ALT (SGPT) 35 13 - 56 U/L    AST (SGOT) 65 (H) 15 - 37 U/L    Alk.  phosphatase 178 (H) 45 - 117 U/L    Protein, total 7.6 6.4 - 8.2 g/dL    Albumin 2.6 (L) 3.4 - 5.0 g/dL    Globulin 5.0 (H) 2.0 - 4.0 g/dL    A-G Ratio 0.5 (L) 0.8 - 1.7     PROTHROMBIN TIME + INR    Collection Time: 04/27/18  4:14 PM   Result Value Ref Range    Prothrombin time 20.4 (H) 11.5 - 15.2 sec    INR 1.8 (H) 0.8 - 1.2     PTT Collection Time: 04/27/18  4:14 PM   Result Value Ref Range    aPTT 38.5 (H) 23.0 - 36.4 SEC   NT-PRO BNP    Collection Time: 04/27/18  4:14 PM   Result Value Ref Range    NT pro- 0 - 900 PG/ML   CARDIAC PANEL,(CK, CKMB & TROPONIN)    Collection Time: 04/27/18  4:14 PM   Result Value Ref Range     26 - 192 U/L    CK - MB 4.3 (H) <3.6 ng/ml    CK-MB Index 2.4 0.0 - 4.0 %    Troponin-I, Qt. 0.03 0.0 - 0.045 NG/ML   EKG, 12 LEAD, INITIAL    Collection Time: 04/27/18  4:28 PM   Result Value Ref Range    Ventricular Rate 94 BPM    Atrial Rate 94 BPM    P-R Interval 158 ms    QRS Duration 96 ms    Q-T Interval 400 ms    QTC Calculation (Bezet) 500 ms    Calculated P Axis 73 degrees    Calculated R Axis -12 degrees    Calculated T Axis 60 degrees    Diagnosis       Normal sinus rhythm  Anterolateral infarct (cited on or before 10-FEB-2018)  Prolonged QT  Abnormal ECG  When compared with ECG of 10-FEB-2018 19:41,  No significant change was found         Radiologic Studies -   CT ABD PELV W CONT   Final Result      XR CHEST PORT   Final Result            Medical Decision Making   I am the first provider for this patient. I reviewed the vital signs, available nursing notes, past medical history, past surgical history, family history and social history. Vital Signs-Reviewed the patient's vital signs. Pulse Oximetry Analysis -  99% on room air (Interpretation)    Cardiac Monitor:  Rate: 95  Rhythm:  Normal Sinus Rhythm      EKG: Interpreted by the EP. Dr. Buckner Clutter    Time Interpreted: 5146   Rate: 94   Rhythm: Normal Sinus Rhythm     Interpretation: NSR, anterolateral infarct, prolonged QT, no stemi    Comparison: unchanged     Records Reviewed: Nursing Notes and Old Medical Records (Time of Review: 4:24 PM)    ED Course: Progress Notes, Reevaluation, and Consults:    1336: Labs relatively unremarkable compared to previous numbers.  Chronic thrombocytopenia, improved, chronic elevation of LFTs, chronic elevation of INR. CT negative for acute process, negative for ascites. All anasarca. Chronic cirrhosis evident, unchanged. Discussed with Dr. Yelitza Casiano. No reason to admit at this time. Will discharge with pain medication. Discussed with Dr. James Abreu. Pain controlled. Discussed treatment plan, return precautions, symptomatic relief, and expected time to improvement. All questions answered. Patient is stable for discharge and outpatient management. Provider Notes (Medical Decision Making): MDM  Number of Diagnoses or Management Options  Diagnosis management comments: 53yo F w/ h/o cirrhosis, DM, HTN, thrombocytopenia who presents with worsening abdominal pain and swelling x 2 weeks. GI and hematologist want her admitted. She has generalized edema of legs and abdomen, looks more like anasarca than ascites. She is obese but there is no clear distention of the abdomen. Vitals are stable, afebrile, not septic appearing. Diagnosis     Clinical Impression:   1. Abdominal pain, generalized    2. Cirrhosis of liver without ascites, unspecified hepatic cirrhosis type ([facility] [address]    3. Thrombocytopenia ([facility] [address]    4. Anasarca        Disposition:     Follow-up Information     Follow up With Details Comments Contact Info    Scott Denton MD Schedule an appointment as soon as possible for a visit  [address] [facility] EMERGENCY DEPT  Immediately if symptoms worsen [address]  [phone]           Patient's Medications   Start Taking    OXYCODONE-ACETAMINOPHEN (PERCOCET) 5-325 MG PER TABLET    Take 1 Tab by mouth every four (4) hours as needed for Pain. Max Daily Amount: 6 Tabs. Continue Taking    CLOTRIMAZOLE (LOTRIMIN) 1 % TOPICAL CREAM    Apply a thin layer over affected area twice daily for up to 14 days. CYCLOBENZAPRINE (FLEXERIL) 10 MG TABLET    Take 1 Tab by mouth three (3) times daily as needed.     GABAPENTIN (NEURONTIN) 300 MG CAPSULE    Take 300 mg by mouth daily. LACTULOSE (CHRONULAC) 10 GRAM/15 ML SOLUTION    Take 45 mL by mouth three (3) times daily as needed. METFORMIN (GLUCOPHAGE) 500 MG TABLET    Take 500 mg by mouth two (2) times daily (with meals). ONDANSETRON (ZOFRAN ODT) 4 MG DISINTEGRATING TABLET    Take 1 Tab by mouth every eight (8) hours as needed for Nausea.     SERTRALINE (ZOLOFT) 50 MG TABLET    TAKE 1 TABLET BY MOUTH EVERY DAY   These Medications have changed    No medications on file   Stop Taking    No medications on file     _______________________________    Attestations:  4:24 PM  4/27/2018  Arnol Forrest PA-C  _______________________________

## 2018-04-27 NOTE — ED NOTES
57 yo F with hx of cirrhosis who presents due to abdominal pain and distension. Denies fever/chills, n/v/d. Was sent to the ED by her GI physician for \"admission for fluid on my abdomen\". I performed a brief evaluation, including history and physical, of the patient here in triage and I have determined that pt will need further treatment and evaluation from the main side ER physician. I have placed initial orders to help in expediting patients care.      April 27, 2018 at 3:35 PM - Vika Mirza

## 2018-04-27 NOTE — DISCHARGE INSTRUCTIONS
Take suggested medications for control of abdominal pain. Increase fluid intake (water). Return to ER if pain continues in 12-24 hours. Return to ER immediately if you develop new or worsening symptoms including vomiting, high fevers, worsening pain. Abdominal Pain: Care Instructions  Your Care Instructions    Abdominal pain has many possible causes. Some aren't serious and get better on their own in a few days. Others need more testing and treatment. If your pain continues or gets worse, you need to be rechecked and may need more tests to find out what is wrong. You may need surgery to correct the problem. Don't ignore new symptoms, such as fever, nausea and vomiting, urination problems, pain that gets worse, and dizziness. These may be signs of a more serious problem. Your doctor may have recommended a follow-up visit in the next 8 to 12 hours. If you are not getting better, you may need more tests or treatment. The doctor has checked you carefully, but problems can develop later. If you notice any problems or new symptoms, get medical treatment right away. Follow-up care is a key part of your treatment and safety. Be sure to make and go to all appointments, and call your doctor if you are having problems. It's also a good idea to know your test results and keep a list of the medicines you take. How can you care for yourself at home? · Rest until you feel better. · To prevent dehydration, drink plenty of fluids, enough so that your urine is light yellow or clear like water. Choose water and other caffeine-free clear liquids until you feel better. If you have kidney, heart, or liver disease and have to limit fluids, talk with your doctor before you increase the amount of fluids you drink. · If your stomach is upset, eat mild foods, such as rice, dry toast or crackers, bananas, and applesauce. Try eating several small meals instead of two or three large ones.   · Wait until 48 hours after all symptoms have gone away before you have spicy foods, alcohol, and drinks that contain caffeine. · Do not eat foods that are high in fat. · Avoid anti-inflammatory medicines such as aspirin, ibuprofen (Advil, Motrin), and naproxen (Aleve). These can cause stomach upset. Talk to your doctor if you take daily aspirin for another health problem. When should you call for help? Call 911 anytime you think you may need emergency care. For example, call if:  ? · You passed out (lost consciousness). ? · You pass maroon or very bloody stools. ? · You vomit blood or what looks like coffee grounds. ? · You have new, severe belly pain. ?Call your doctor now or seek immediate medical care if:  ? · Your pain gets worse, especially if it becomes focused in one area of your belly. ? · You have a new or higher fever. ? · Your stools are black and look like tar, or they have streaks of blood. ? · You have unexpected vaginal bleeding. ? · You have symptoms of a urinary tract infection. These may include:  ¨ Pain when you urinate. ¨ Urinating more often than usual.  ¨ Blood in your urine. ? · You are dizzy or lightheaded, or you feel like you may faint. ? Watch closely for changes in your health, and be sure to contact your doctor if:  ? · You are not getting better after 1 day (24 hours). Where can you learn more? Go to http://adela-santhosh.info/. Enter J534 in the search box to learn more about \"Abdominal Pain: Care Instructions. \"  Current as of: March 20, 2017  Content Version: 11.4  © 4836-9433 [employer]. Care instructions adapted under license by [employer] (which disclaims liability or warranty for this information). If you have questions about a medical condition or this instruction, always ask your healthcare professional. [address] any warranty or liability for your use of this information.          Cirrhosis: Care Instructions  Your Care Instructions    Cirrhosis occurs when healthy tissue in your liver gets scarred. This keeps the liver from working well. It usually happens after a liver has been inflamed for years. Cirrhosis is most often caused by alcohol abuse or hepatitis infection. But there are other causes too. These include medicines and too much fat in the liver. Conditions passed down in families and other disorders can also cause it. In some cases, no cause can be found. Treatment can't completely fix liver damage. But you may be able to slow or prevent more damage if you don't drink alcohol or use drugs that harm your liver. Follow-up care is a key part of your treatment and safety. Be sure to make and go to all appointments, and call your doctor if you are having problems. It's also a good idea to know your test results and keep a list of the medicines you take. How can you care for yourself at home? · Do not drink any alcohol. It can harm your liver. Talk to your doctor if you need help to stop drinking. · Be safe with medicines. Take your medicines exactly as prescribed. Call your doctor if you think you are having a problem with your medicine. · Talk to your doctor before you take any other medicines. These include over-the-counter medicines and herbal products. · Be careful taking acetaminophen (Tylenol), ibuprofen (Advil, Motrin), or naproxen (Aleve). These can sometimes cause more liver damage. Talk with your doctor if you're not sure which medicines are safe. · If your cirrhosis causes extra fluid to build up in your body, try not to eat a lot of salt. Use less salt when you cook and at the table. Don't eat fast foods or snack foods with a lot of salt. Extra fluid in your belly, legs, and chest can cause serious problems. · Work with your doctor or a dietitian to be sure you eat the right amount of carbohydrate, protein, fat, and sodium (salt).  It's very important to choose the best foods for the health of your liver.  · If your doctor recommends it, limit how much fluid you drink. · If your doctor recommends it, get more exercise. Walking is a good choice. Bit by bit, increase the amount you walk every day. Try for at least 30 minutes on most days of the week. You also may want to swim, bike, or do other activities. When should you call for help? Call 911 anytime you think you may need emergency care. For example, call if:  ? · You have trouble breathing. ? · You vomit blood or what looks like coffee grounds. ?Call your doctor now or seek immediate medical care if:  ? · You feel very sleepy or confused. ? · You have new belly pain, or your pain gets worse. ? · You have a fever. ? · There is a new or increasing yellow tint to your skin or the whites of your eyes. ? · You have any abnormal bleeding, such as:  ¨ Nosebleeds. ¨ Vaginal bleeding that is different (heavier, more frequent, at a different time of the month) than what you are used to. ¨ Bloody or black stools, or rectal bleeding. ¨ Bloody or pink urine. ? Watch closely for changes in your health, and be sure to contact your doctor if:  ? · You have any problems. ? · Your belly is getting bigger. ? · You are gaining weight. Where can you learn more? Go to http://adela-santhosh.info/. Enter M412 in the search box to learn more about \"Cirrhosis: Care Instructions. \"  Current as of: May 12, 2017  Content Version: 11.4  © 7495-3037 [employer]. Care instructions adapted under license by [employer] (which disclaims liability or warranty for this information). If you have questions about a medical condition or this instruction, always ask your healthcare professional. [place] [address] any warranty or liability for your use of this information. Liver Disease Diet: Care Instructions  Your Care Instructions    The liver does many jobs that are vital to the rest of your body.  When something is wrong with the liver, your body may not get the nutrition it needs. It is important that you eat a healthy diet that includes a variety of foods from the basic food groups: grains, vegetables, fruits, dairy, and protein foods. Follow your doctor's instructions for eating carbohydrate, protein, and fat in the right amounts for you. Your doctor also may limit salt or take salt out of your diet to help protect the liver. Always talk with your doctor or dietitian before you make changes in your diet. Follow-up care is a key part of your treatment and safety. Be sure to make and go to all appointments, and call your doctor if you are having problems. It's also a good idea to know your test results and keep a list of the medicines you take. How can you care for yourself at home? · Work with your doctor or dietitian to create a food plan that guides your daily food choices. · Eat a balanced diet. Do not skip meals or go for many hours without eating. If you eat several small meals during the day, you have a better chance of getting the extra calories your body needs for energy. · Your doctor may recommend a high-carbohydrate diet to get enough calories, since you may have to limit fat. You may need to spread carbohydrate throughout your meals and snacks to control the amount of sugar in your blood. Eat six small meals a day, rather than three big ones. Carbohydrate is found in:  ¨ Whole-grain and refined breads and cereals. ¨ Some vegetables. ¨ Cooked beans (such as kidney or black beans) and peas (such as lentils or split peas). ¨ Fruits. ¨ Low-fat or nonfat milk and milk products, which also supply protein. ¨ Candy, table sugar, and sugary soda drinks (try to limit these). · Follow your doctor's or dietitian's instructions on how to get the right amount of protein in your diet. Examples of animal protein are:  [employer], fish, and poultry. ¨ Eggs. ¨ Milk and milk products.   · Your doctor or dietitian may ask you to eat a certain amount of protein that comes from plants (rather than protein that comes from animals). You can get plant protein from foods such as:  ¨ Cooked dried beans and peas. ¨ Peanut butter, nuts, and seeds. ¨ Tofu. · Limit salt, if your doctor tells you to. This will help prevent fluid buildup in your belly and chest, which can cause serious problems. Salt is in many prepared foods, such as colindres, canned foods, snack foods, sauces, and soups. Look for reduced-salt products. · Your doctor may recommend vitamin and mineral supplements. However, do not take any other medicine, including over-the-counter medicines, vitamins, and herbal products, without talking to your doctor first.  · Do NOT take acetaminophen (Tylenol), ibuprofen (Advil, Motrin), or naproxen (Aleve). These can cause more liver damage. · Do not drink any alcohol. It can harm your liver. Talk to your doctor if you need help to stop drinking. · If you have a loss of appetite or have nausea or vomiting, try to:  ¨ Stay away from foods and food smells that make you feel worse. ¨ Avoid greasy or fatty foods. ¨ Eat food that settles your stomach when it feels upset. Try crackers, dry toast, or ivy (ivy tea, hard ivy candy, or crystallized ivy). When should you call for help? Watch closely for changes in your health, and be sure to contact your doctor if you have any problems. Where can you learn more? Go to http://adela-santhosh.info/. Enter K783 in the search box to learn more about \"Liver Disease Diet: Care Instructions. \"  Current as of: May 12, 2017  Content Version: 11.4  © 4264-1192 [employer]. Care instructions adapted under license by [employer] (which disclaims liability or warranty for this information).  If you have questions about a medical condition or this instruction, always ask your healthcare professional. [address] any warranty or liability for your use of this information.

## 2018-04-28 ENCOUNTER — HOSPITAL ENCOUNTER (EMERGENCY)
Age: 58
Discharge: HOME | End: 2018-04-28
Attending: EMERGENCY MEDICINE
Payer: MEDICAID

## 2018-04-28 VITALS
OXYGEN SATURATION: 100 % | WEIGHT: 293 LBS | DIASTOLIC BLOOD PRESSURE: 46 MMHG | HEIGHT: 64 IN | TEMPERATURE: 98 F | RESPIRATION RATE: 18 BRPM | HEART RATE: 98 BPM | SYSTOLIC BLOOD PRESSURE: 143 MMHG | BODY MASS INDEX: 50.02 KG/M2

## 2018-04-28 DIAGNOSIS — Z87.19 HISTORY OF CIRRHOSIS OF LIVER: ICD-10-CM

## 2018-04-28 DIAGNOSIS — H01.003 BLEPHARITIS OF BOTH EYES, UNSPECIFIED EYELID, UNSPECIFIED TYPE: ICD-10-CM

## 2018-04-28 DIAGNOSIS — H01.006 BLEPHARITIS OF BOTH EYES, UNSPECIFIED EYELID, UNSPECIFIED TYPE: ICD-10-CM

## 2018-04-28 DIAGNOSIS — R10.84 ABDOMINAL PAIN, GENERALIZED: Primary | ICD-10-CM

## 2018-04-28 LAB
ATRIAL RATE: 94 BPM
CALCULATED P AXIS, ECG09: 73 DEGREES
CALCULATED R AXIS, ECG10: -12 DEGREES
CALCULATED T AXIS, ECG11: 60 DEGREES
DIAGNOSIS, 93000: NORMAL
P-R INTERVAL, ECG05: 158 MS
Q-T INTERVAL, ECG07: 400 MS
QRS DURATION, ECG06: 96 MS
QTC CALCULATION (BEZET), ECG08: 500 MS
VENTRICULAR RATE, ECG03: 94 BPM

## 2018-04-28 PROCEDURE — 99282 EMERGENCY DEPT VISIT SF MDM: CPT

## 2018-04-28 RX ORDER — POLYMYXIN B SULFATE AND TRIMETHOPRIM 1; 10000 MG/ML; [USP'U]/ML
1 SOLUTION OPHTHALMIC EVERY 4 HOURS
Qty: 10 ML | Refills: 0 | Status: SHIPPED | OUTPATIENT
Start: 2018-04-28 | End: 2018-06-06

## 2018-04-28 NOTE — ED TRIAGE NOTES
PATIENT  States she was seen here yesterday, states the pain has not gotten better, states she has some pain radiating to the L back, denies N/V/D and dysuria

## 2018-04-28 NOTE — ED PROVIDER NOTES
HPI Comments: Leonardo Carey is a 56year old female who presents to the ED with a c/o abdominal pain and eye puffiness. States she was here yesterday, and was told to come back today to have \"fluid drained off her abdomen. \"   Denies visual difficulty, and doesn't know why her eyes are puffy. denies seasonal allergies. Adds that nothing makes her abdominal pain better or worse. Patient is a 56 y.o. female presenting with abdominal pain. The history is provided by the patient. History limited by: No communicationb arrier. Abdominal Pain    This is a new problem. The current episode started more than 1 week ago. The problem occurs constantly. The problem has not changed since onset. Associated with: Pt makes no association. Past Medical History:   Diagnosis Date    Bilateral knee pain     Cirrhosis of liver (HCC)     Depression     Diabetes ([facility] [address]     Diastolic hypertension     Fatty liver     Gastritis     Hypertension     Ill-defined condition     Inflammation of lymphatics     Liver disease     cirrhosis    Menorrhagia     HANKS (nonalcoholic steatohepatitis)     Nausea     Osteoarthritis     Right flank pain     RUQ abdominal pain     Sleep apnea     Does not use consistently    Thrombocytopenia (HCC)        Past Surgical History:   Procedure Laterality Date    ENDOSCOPY, COLON, DIAGNOSTIC  5/20/2013    HX HYSTERECTOMY      HX OTHER SURGICAL      liver biopsy    HX OVARIAN CYST REMOVAL      Bilateral         No family history on file. Social History     Social History    Marital status:      Spouse name: N/A    Number of children: N/A    Years of education: N/A     Occupational History    Not on file.      Social History Main Topics    Smoking status: Never Smoker    Smokeless tobacco: Never Used    Alcohol use No    Drug use: No    Sexual activity: Yes     Partners: Male     Birth control/ protection: None     Other Topics Concern    Blood Transfusions Yes prior to hysterectomy    Occupational Exposure No    Hobby Hazards No    Stress Concern No    Weight Concern Yes    Exercise No    Seat Belt Yes    Self-Exams No     Social History Narrative         ALLERGIES: Morphine    Review of Systems   Constitutional: Negative. HENT: Negative. Eyes: Negative. Eyelid puffiness. Respiratory: Negative. Cardiovascular: Negative. Gastrointestinal: Positive for abdominal pain. Endocrine: Negative. Genitourinary: Negative. Musculoskeletal: Negative. Skin: Negative. Allergic/Immunologic: Negative. Neurological: Negative. Hematological: Negative. Psychiatric/Behavioral: Negative. Vitals:    04/28/18 1516   BP: 144/71   Pulse: 95   Resp: 16   Temp: 98 °F (36.7 °C)   SpO2: 97%   Weight: 157.4 kg (347 lb)   Height: 5' 4\" (1.626 m)            Physical Exam   Constitutional: She is oriented to person, place, and time. She appears well-developed and well-nourished. No distress. HENT:   Head: Normocephalic and atraumatic. Eyes: EOM are normal. Pupils are equal, round, and reactive to light. Very superficial edema of the bilateral palpebrae. Neck: Normal range of motion. Neck supple. Cardiovascular: Normal rate, regular rhythm and normal heart sounds. Pulmonary/Chest: No respiratory distress. She has no wheezes. She has no rales. Abdominal: Soft. Bowel sounds are normal. She exhibits distension (Morbid obesity). There is no tenderness. There is no rebound. Genitourinary:   Genitourinary Comments: NE   Musculoskeletal: Normal range of motion. Neurological: She is alert and oriented to person, place, and time. Skin: Skin is warm and dry. Psychiatric: She has a normal mood and affect. Nursing note and vitals reviewed.        MDM  Number of Diagnoses or Management Options  Abdominal pain, generalized:   Blepharitis of both eyes, unspecified eyelid, unspecified type:   History of cirrhosis of liver:   Diagnosis management comments: PROGRESS NOTE:  Discussed findings with the Pt. The CT scan reveals no ascites. Will DC the Pt to home for follow up with PCP. Of note, had to awaken the Pt to discuss her care with her. Mian Avery NP  5:58 PM    MDM:  Did not repeat labs, studies on the Pt as feel they are unnecessary. Mian Avery NP  5:59 PM          Risk of Complications, Morbidity, and/or Mortality  Presenting problems: low  Diagnostic procedures: low  Management options: low          ED Course       Procedures    Diagnosis:   1. Abdominal pain, generalized    2. History of cirrhosis of liver    3. Blepharitis of both eyes, unspecified eyelid, unspecified type          Disposition:   Discharged to Home. Follow-up Information     Follow up With Details Comments Contact Info    Peter Maher MD Call on Monday to arrange an appointment. [address] [place] [address]  [phone]            Patient's Medications   Start Taking    TRIMETHOPRIM-POLYMYXIN B (POLYTRIM) OPHTHALMIC SOLUTION    Administer 1 Drop to both eyes every four (4) hours. Continue Taking    CLOTRIMAZOLE (LOTRIMIN) 1 % TOPICAL CREAM    Apply a thin layer over affected area twice daily for up to 14 days. CYCLOBENZAPRINE (FLEXERIL) 10 MG TABLET    Take 1 Tab by mouth three (3) times daily as needed. GABAPENTIN (NEURONTIN) 300 MG CAPSULE    Take 300 mg by mouth daily. LACTULOSE (CHRONULAC) 10 GRAM/15 ML SOLUTION    Take 45 mL by mouth three (3) times daily as needed. METFORMIN (GLUCOPHAGE) 500 MG TABLET    Take 500 mg by mouth two (2) times daily (with meals). ONDANSETRON (ZOFRAN ODT) 4 MG DISINTEGRATING TABLET    Take 1 Tab by mouth every eight (8) hours as needed for Nausea. OXYCODONE-ACETAMINOPHEN (PERCOCET) 5-325 MG PER TABLET    Take 1 Tab by mouth every four (4) hours as needed for Pain. Max Daily Amount: 6 Tabs.     SERTRALINE (ZOLOFT) 50 MG TABLET    TAKE 1 TABLET BY MOUTH EVERY DAY   These Medications have changed    No medications on file   Stop Taking    No medications on file

## 2018-04-28 NOTE — DISCHARGE INSTRUCTIONS
Blepharitis: Care Instructions  Your Care Instructions    Blepharitis is an inflammation or infection of the eyelids. It causes dry, scaly crusts on the eyelids. It can also cause your eyes to itch, burn, and look red. This problem is more common in people who have rosacea, dandruff, skin allergies, or eczema. Home treatment can help you keep your eyes comfortable. Your doctor may also prescribe an ointment to put on your eyelids. Follow-up care is a key part of your treatment and safety. Be sure to make and go to all appointments, and call your doctor if you are having problems. It's also a good idea to know your test results and keep a list of the medicines you take. How can you care for yourself at home? · Wash your eyelids and eyebrows daily with baby shampoo. To wash your eyelids:  ¨ Place a very warm washcloth over your eyes for about a minute. This will help soften and loosen the crusts on your eyelashes. ¨ Put a few drops of baby shampoo on a warm washcloth. ¨ Gently wipe your eyelids. This helps remove any crust. It also cleans your eyelids. ¨ Rinse well with water. · Be safe with medicines. If your doctor prescribed medicine for you, use it exactly as directed. Call your doctor if you think you are having a problem with your medicine. When should you call for help? Call your doctor now or seek immediate medical care if:  ? · You have signs of an eye infection, such as:  ¨ Pus or thick discharge coming from the eye. ¨ Redness or swelling around the eye. ¨ A fever. ? Watch closely for changes in your health, and be sure to contact your doctor if:  ? · You have vision changes. ? · You do not get better as expected. Where can you learn more? Go to http://adela-santhosh.info/. Enter P234 in the search box to learn more about \"Blepharitis: Care Instructions. \"  Current as of: March 3, 2017  Content Version: 11.4  © 6345-8969 [employer].  Care instructions adapted under license by [employer] (which disclaims liability or warranty for this information). If you have questions about a medical condition or this instruction, always ask your healthcare professional. [address] any warranty or liability for your use of this information. [employer] Activation    Thank you for requesting access to [employer]. Please follow the instructions below to securely access and download your online medical record. [employer] allows you to send messages to your doctor, view your test results, renew your prescriptions, schedule appointments, and more. How Do I Sign Up? 1. In your internet browser, go to [email or web address]  2. Click on the First Time User? Click Here link in the Sign In box. You will be redirect to the New Member Sign Up page. 3. Enter your [employer] Access Code exactly as it appears below. You will not need to use this code after youve completed the sign-up process. If you do not sign up before the expiration date, you must request a new code. [employer] Access Code: Activation code not generated  Current [employer] Status: Active (This is the date your [employer] access code will )    4. Enter the last four digits of your Social Security Number (xxxx) and Date of Birth (mm/dd/yyyy) as indicated and click Submit. You will be taken to the next sign-up page. 5. Create a [employer] ID. This will be your [employer] login ID and cannot be changed, so think of one that is secure and easy to remember. 6. Create a [employer] password. You can change your password at any time. 7. Enter your Password Reset Question and Answer. This can be used at a later time if you forget your password. 8. Enter your e-mail address. You will receive e-mail notification when new information is available in [address] 9. Click Sign Up. You can now view and download portions of your medical record.   10. Click the Download Summary menu link to download a portable copy of your medical information. Additional Information    If you have questions, please visit the Frequently Asked Questions section of the [employer] website at [email or web address] Remember, [employer] is NOT to be used for urgent needs. For medical emergencies, dial 911. Follow up with your primary care provider for further evaluation and treatment.

## 2018-06-02 ENCOUNTER — HOSPITAL ENCOUNTER (EMERGENCY)
Age: 58
Discharge: HOME | End: 2018-06-03
Attending: EMERGENCY MEDICINE
Payer: MEDICAID

## 2018-06-02 DIAGNOSIS — R10.31 ABDOMINAL PAIN, RIGHT LOWER QUADRANT: Primary | ICD-10-CM

## 2018-06-02 PROCEDURE — 99285 EMERGENCY DEPT VISIT HI MDM: CPT

## 2018-06-02 PROCEDURE — 93005 ELECTROCARDIOGRAM TRACING: CPT

## 2018-06-03 ENCOUNTER — APPOINTMENT (OUTPATIENT)
Dept: CT IMAGING | Age: 58
End: 2018-06-03
Attending: EMERGENCY MEDICINE
Payer: MEDICAID

## 2018-06-03 VITALS
SYSTOLIC BLOOD PRESSURE: 133 MMHG | HEIGHT: 64 IN | TEMPERATURE: 98.6 F | BODY MASS INDEX: 39.27 KG/M2 | RESPIRATION RATE: 17 BRPM | HEART RATE: 97 BPM | OXYGEN SATURATION: 99 % | WEIGHT: 230 LBS | DIASTOLIC BLOOD PRESSURE: 62 MMHG

## 2018-06-03 LAB
ALBUMIN SERPL-MCNC: 2.2 G/DL (ref 3.4–5)
ALBUMIN/GLOB SERPL: 0.4 {RATIO} (ref 0.8–1.7)
ALP SERPL-CCNC: 126 U/L (ref 45–117)
ALT SERPL-CCNC: 32 U/L (ref 13–56)
ANION GAP SERPL CALC-SCNC: 7 MMOL/L (ref 3–18)
AST SERPL-CCNC: 62 U/L (ref 15–37)
ATRIAL RATE: 96 BPM
BASOPHILS # BLD: 0 K/UL (ref 0–0.06)
BASOPHILS NFR BLD: 1 % (ref 0–2)
BILIRUB SERPL-MCNC: 5 MG/DL (ref 0.2–1)
BUN SERPL-MCNC: 6 MG/DL (ref 7–18)
BUN/CREAT SERPL: 8 (ref 12–20)
CALCIUM SERPL-MCNC: 8.2 MG/DL (ref 8.5–10.1)
CALCULATED P AXIS, ECG09: 55 DEGREES
CALCULATED R AXIS, ECG10: -13 DEGREES
CALCULATED T AXIS, ECG11: 44 DEGREES
CHLORIDE SERPL-SCNC: 106 MMOL/L (ref 100–108)
CO2 SERPL-SCNC: 25 MMOL/L (ref 21–32)
CREAT SERPL-MCNC: 0.75 MG/DL (ref 0.6–1.3)
DIAGNOSIS, 93000: NORMAL
DIFFERENTIAL METHOD BLD: ABNORMAL
EOSINOPHIL # BLD: 0.1 K/UL (ref 0–0.4)
EOSINOPHIL NFR BLD: 1 % (ref 0–5)
ERYTHROCYTE [DISTWIDTH] IN BLOOD BY AUTOMATED COUNT: 15.9 % (ref 11.6–14.5)
GLOBULIN SER CALC-MCNC: 5.2 G/DL (ref 2–4)
GLUCOSE SERPL-MCNC: 112 MG/DL (ref 74–99)
HCT VFR BLD AUTO: 34.1 % (ref 35–45)
HGB BLD-MCNC: 11.7 G/DL (ref 12–16)
LIPASE SERPL-CCNC: 267 U/L (ref 73–393)
LYMPHOCYTES # BLD: 0.8 K/UL (ref 0.9–3.6)
LYMPHOCYTES NFR BLD: 14 % (ref 21–52)
MAGNESIUM SERPL-MCNC: 1.7 MG/DL (ref 1.6–2.6)
MCH RBC QN AUTO: 36.3 PG (ref 24–34)
MCHC RBC AUTO-ENTMCNC: 34.3 G/DL (ref 31–37)
MCV RBC AUTO: 105.9 FL (ref 74–97)
MONOCYTES # BLD: 0.7 K/UL (ref 0.05–1.2)
MONOCYTES NFR BLD: 11 % (ref 3–10)
NEUTS SEG # BLD: 4.4 K/UL (ref 1.8–8)
NEUTS SEG NFR BLD: 73 % (ref 40–73)
P-R INTERVAL, ECG05: 154 MS
PLATELET # BLD AUTO: 56 K/UL (ref 135–420)
PMV BLD AUTO: 9.7 FL (ref 9.2–11.8)
POTASSIUM SERPL-SCNC: 4.2 MMOL/L (ref 3.5–5.5)
PROT SERPL-MCNC: 7.4 G/DL (ref 6.4–8.2)
Q-T INTERVAL, ECG07: 384 MS
QRS DURATION, ECG06: 98 MS
QTC CALCULATION (BEZET), ECG08: 485 MS
RBC # BLD AUTO: 3.22 M/UL (ref 4.2–5.3)
SODIUM SERPL-SCNC: 138 MMOL/L (ref 136–145)
VENTRICULAR RATE, ECG03: 96 BPM
WBC # BLD AUTO: 6 K/UL (ref 4.6–13.2)

## 2018-06-03 PROCEDURE — 83735 ASSAY OF MAGNESIUM: CPT | Performed by: EMERGENCY MEDICINE

## 2018-06-03 PROCEDURE — 85025 COMPLETE CBC W/AUTO DIFF WBC: CPT | Performed by: EMERGENCY MEDICINE

## 2018-06-03 PROCEDURE — 83690 ASSAY OF LIPASE: CPT | Performed by: EMERGENCY MEDICINE

## 2018-06-03 PROCEDURE — 80053 COMPREHEN METABOLIC PANEL: CPT | Performed by: EMERGENCY MEDICINE

## 2018-06-03 PROCEDURE — 74011250637 HC RX REV CODE- 250/637: Performed by: EMERGENCY MEDICINE

## 2018-06-03 PROCEDURE — 74176 CT ABD & PELVIS W/O CONTRAST: CPT

## 2018-06-03 RX ORDER — LIDOCAINE 50 MG/G
PATCH TOPICAL
Qty: 1 EACH | Refills: 0 | Status: SHIPPED | OUTPATIENT
Start: 2018-06-03

## 2018-06-03 RX ORDER — OXYCODONE AND ACETAMINOPHEN 5; 325 MG/1; MG/1
2 TABLET ORAL
Status: COMPLETED | OUTPATIENT
Start: 2018-06-03 | End: 2018-06-03

## 2018-06-03 RX ORDER — OXYCODONE AND ACETAMINOPHEN 5; 325 MG/1; MG/1
TABLET ORAL
Status: DISCONTINUED
Start: 2018-06-03 | End: 2018-06-03 | Stop reason: HOSPADM

## 2018-06-03 RX ADMIN — OXYCODONE HYDROCHLORIDE AND ACETAMINOPHEN 2 TABLET: 5; 325 TABLET ORAL at 01:37

## 2018-06-03 NOTE — ED NOTES
4:35 AM  06/03/18     Discharge instructions given to patient (name) with verbalization of understanding. Patient accompanied by self. Patient discharged with the following prescriptions Lidocaine patch. Patient discharged to home (destination).       Bindu Hart RN

## 2018-06-03 NOTE — DISCHARGE INSTRUCTIONS
Abdominal Pain: Care Instructions  Your Care Instructions    Abdominal pain has many possible causes. Some aren't serious and get better on their own in a few days. Others need more testing and treatment. If your pain continues or gets worse, you need to be rechecked and may need more tests to find out what is wrong. You may need surgery to correct the problem. Don't ignore new symptoms, such as fever, nausea and vomiting, urination problems, pain that gets worse, and dizziness. These may be signs of a more serious problem. Your doctor may have recommended a follow-up visit in the next 8 to 12 hours. If you are not getting better, you may need more tests or treatment. The doctor has checked you carefully, but problems can develop later. If you notice any problems or new symptoms, get medical treatment right away. Follow-up care is a key part of your treatment and safety. Be sure to make and go to all appointments, and call your doctor if you are having problems. It's also a good idea to know your test results and keep a list of the medicines you take. How can you care for yourself at home? · Rest until you feel better. · To prevent dehydration, drink plenty of fluids, enough so that your urine is light yellow or clear like water. Choose water and other caffeine-free clear liquids until you feel better. If you have kidney, heart, or liver disease and have to limit fluids, talk with your doctor before you increase the amount of fluids you drink. · If your stomach is upset, eat mild foods, such as rice, dry toast or crackers, bananas, and applesauce. Try eating several small meals instead of two or three large ones. · Wait until 48 hours after all symptoms have gone away before you have spicy foods, alcohol, and drinks that contain caffeine. · Do not eat foods that are high in fat. · Avoid anti-inflammatory medicines such as aspirin, ibuprofen (Advil, Motrin), and naproxen (Aleve).  These can cause stomach upset. Talk to your doctor if you take daily aspirin for another health problem. When should you call for help? Call 911 anytime you think you may need emergency care. For example, call if:  ? · You passed out (lost consciousness). ? · You pass maroon or very bloody stools. ? · You vomit blood or what looks like coffee grounds. ? · You have new, severe belly pain. ?Call your doctor now or seek immediate medical care if:  ? · Your pain gets worse, especially if it becomes focused in one area of your belly. ? · You have a new or higher fever. ? · Your stools are black and look like tar, or they have streaks of blood. ? · You have unexpected vaginal bleeding. ? · You have symptoms of a urinary tract infection. These may include:  ¨ Pain when you urinate. ¨ Urinating more often than usual.  ¨ Blood in your urine. ? · You are dizzy or lightheaded, or you feel like you may faint. ? Watch closely for changes in your health, and be sure to contact your doctor if:  ? · You are not getting better after 1 day (24 hours). Where can you learn more? Go to http://adela-santhosh.info/. Enter Y315 in the search box to learn more about \"Abdominal Pain: Care Instructions. \"  Current as of: March 20, 2017  Content Version: 11.4  © 8631-7786 [employer]. Care instructions adapted under license by [employer] (which disclaims liability or warranty for this information). If you have questions about a medical condition or this instruction, always ask your healthcare professional. [place] [address] any warranty or liability for your use of this information.

## 2018-06-06 ENCOUNTER — APPOINTMENT (OUTPATIENT)
Dept: CT IMAGING | Age: 58
End: 2018-06-06
Attending: EMERGENCY MEDICINE
Payer: MEDICAID

## 2018-06-06 ENCOUNTER — HOSPITAL ENCOUNTER (EMERGENCY)
Age: 58
Discharge: HOME | End: 2018-06-06
Attending: EMERGENCY MEDICINE
Payer: MEDICAID

## 2018-06-06 VITALS
SYSTOLIC BLOOD PRESSURE: 116 MMHG | OXYGEN SATURATION: 97 % | RESPIRATION RATE: 22 BRPM | WEIGHT: 270 LBS | TEMPERATURE: 100.7 F | BODY MASS INDEX: 46.35 KG/M2 | DIASTOLIC BLOOD PRESSURE: 57 MMHG | HEART RATE: 98 BPM
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
## 2018-06-06 DIAGNOSIS — N39.0 ACUTE UTI: ICD-10-CM

## 2018-06-06 DIAGNOSIS — S09.90XA CLOSED HEAD INJURY, INITIAL ENCOUNTER: ICD-10-CM

## 2018-06-06 DIAGNOSIS — L03.311 CELLULITIS, ABDOMINAL WALL: Primary | ICD-10-CM

## 2018-06-06 LAB
ALBUMIN SERPL-MCNC: 2.3 G/DL (ref 3.4–5)
ALBUMIN/GLOB SERPL: 0.5 {RATIO} (ref 0.8–1.7)
ALP SERPL-CCNC: 122 U/L (ref 45–117)
ALT SERPL-CCNC: 27 U/L (ref 13–56)
ANION GAP SERPL CALC-SCNC: 8 MMOL/L (ref 3–18)
APPEARANCE UR: CLEAR
AST SERPL-CCNC: 52 U/L (ref 15–37)
BACTERIA URNS QL MICRO: ABNORMAL /HPF
BASOPHILS # BLD: 0 K/UL (ref 0–0.06)
BASOPHILS NFR BLD: 0 % (ref 0–2)
BILIRUB SERPL-MCNC: 8.4 MG/DL (ref 0.2–1)
BILIRUB UR QL: ABNORMAL
BUN SERPL-MCNC: 10 MG/DL (ref 7–18)
BUN/CREAT SERPL: 11 (ref 12–20)
CALCIUM SERPL-MCNC: 8.6 MG/DL (ref 8.5–10.1)
CHLORIDE SERPL-SCNC: 102 MMOL/L (ref 100–108)
CO2 SERPL-SCNC: 26 MMOL/L (ref 21–32)
COLOR UR: ABNORMAL
CREAT SERPL-MCNC: 0.88 MG/DL (ref 0.6–1.3)
DIFFERENTIAL METHOD BLD: ABNORMAL
EOSINOPHIL # BLD: 0.1 K/UL (ref 0–0.4)
EOSINOPHIL NFR BLD: 1 % (ref 0–5)
EPITH CASTS URNS QL MICRO: ABNORMAL /LPF (ref 0–5)
ERYTHROCYTE [DISTWIDTH] IN BLOOD BY AUTOMATED COUNT: 16.3 % (ref 11.6–14.5)
GLOBULIN SER CALC-MCNC: 4.6 G/DL (ref 2–4)
GLUCOSE SERPL-MCNC: 206 MG/DL (ref 74–99)
GLUCOSE UR STRIP.AUTO-MCNC: 100 MG/DL
HCT VFR BLD AUTO: 33.7 % (ref 35–45)
HGB BLD-MCNC: 11.6 G/DL (ref 12–16)
HGB UR QL STRIP: ABNORMAL
KETONES UR QL STRIP.AUTO: NEGATIVE MG/DL
LEUKOCYTE ESTERASE UR QL STRIP.AUTO: ABNORMAL
LIPASE SERPL-CCNC: 200 U/L (ref 73–393)
LYMPHOCYTES # BLD: 1 K/UL (ref 0.9–3.6)
LYMPHOCYTES NFR BLD: 9 % (ref 21–52)
MCH RBC QN AUTO: 36.1 PG (ref 24–34)
MCHC RBC AUTO-ENTMCNC: 34.4 G/DL (ref 31–37)
MCV RBC AUTO: 105 FL (ref 74–97)
MONOCYTES # BLD: 1.7 K/UL (ref 0.05–1.2)
MONOCYTES NFR BLD: 15 % (ref 3–10)
MUCOUS THREADS URNS QL MICRO: ABNORMAL /LPF
NEUTS SEG # BLD: 8.9 K/UL (ref 1.8–8)
NEUTS SEG NFR BLD: 75 % (ref 40–73)
NITRITE UR QL STRIP.AUTO: POSITIVE
PH UR STRIP: 5.5 [PH] (ref 5–8)
PLATELET # BLD AUTO: 49 K/UL (ref 135–420)
PMV BLD AUTO: 9.5 FL (ref 9.2–11.8)
POTASSIUM SERPL-SCNC: 3.9 MMOL/L (ref 3.5–5.5)
PROT SERPL-MCNC: 6.9 G/DL (ref 6.4–8.2)
PROT UR STRIP-MCNC: ABNORMAL MG/DL
RBC # BLD AUTO: 3.21 M/UL (ref 4.2–5.3)
RBC #/AREA URNS HPF: ABNORMAL /HPF (ref 0–5)
SODIUM SERPL-SCNC: 136 MMOL/L (ref 136–145)
SP GR UR REFRACTOMETRY: >1.03 (ref 1–1.03)
UROBILINOGEN UR QL STRIP.AUTO: 2 EU/DL (ref 0.2–1)
WBC # BLD AUTO: 11.8 K/UL (ref 4.6–13.2)
WBC URNS QL MICRO: ABNORMAL /HPF (ref 0–4)

## 2018-06-06 PROCEDURE — 74011250636 HC RX REV CODE- 250/636

## 2018-06-06 PROCEDURE — 74011250636 HC RX REV CODE- 250/636: Performed by: EMERGENCY MEDICINE

## 2018-06-06 PROCEDURE — 96361 HYDRATE IV INFUSION ADD-ON: CPT

## 2018-06-06 PROCEDURE — 77030011943

## 2018-06-06 PROCEDURE — 74011000258 HC RX REV CODE- 258: Performed by: EMERGENCY MEDICINE

## 2018-06-06 PROCEDURE — 83690 ASSAY OF LIPASE: CPT | Performed by: EMERGENCY MEDICINE

## 2018-06-06 PROCEDURE — 96376 TX/PRO/DX INJ SAME DRUG ADON: CPT

## 2018-06-06 PROCEDURE — 81001 URINALYSIS AUTO W/SCOPE: CPT | Performed by: EMERGENCY MEDICINE

## 2018-06-06 PROCEDURE — 96365 THER/PROPH/DIAG IV INF INIT: CPT

## 2018-06-06 PROCEDURE — 96375 TX/PRO/DX INJ NEW DRUG ADDON: CPT

## 2018-06-06 PROCEDURE — 74011636320 HC RX REV CODE- 636/320: Performed by: EMERGENCY MEDICINE

## 2018-06-06 PROCEDURE — 85025 COMPLETE CBC W/AUTO DIFF WBC: CPT | Performed by: EMERGENCY MEDICINE

## 2018-06-06 PROCEDURE — 80053 COMPREHEN METABOLIC PANEL: CPT | Performed by: EMERGENCY MEDICINE

## 2018-06-06 PROCEDURE — 99285 EMERGENCY DEPT VISIT HI MDM: CPT

## 2018-06-06 PROCEDURE — 74177 CT ABD & PELVIS W/CONTRAST: CPT

## 2018-06-06 PROCEDURE — 74011250637 HC RX REV CODE- 250/637: Performed by: EMERGENCY MEDICINE

## 2018-06-06 PROCEDURE — 70450 CT HEAD/BRAIN W/O DYE: CPT

## 2018-06-06 RX ORDER — HYDROMORPHONE HYDROCHLORIDE 1 MG/ML
INJECTION, SOLUTION INTRAMUSCULAR; INTRAVENOUS; SUBCUTANEOUS
Status: DISCONTINUED
Start: 2018-06-06 | End: 2018-06-06 | Stop reason: HOSPADM

## 2018-06-06 RX ORDER — ONDANSETRON 2 MG/ML
4 INJECTION INTRAMUSCULAR; INTRAVENOUS
Status: COMPLETED | OUTPATIENT
Start: 2018-06-06 | End: 2018-06-06

## 2018-06-06 RX ORDER — HYDROMORPHONE HYDROCHLORIDE 2 MG/ML
0.5 INJECTION, SOLUTION INTRAMUSCULAR; INTRAVENOUS; SUBCUTANEOUS
Status: COMPLETED | OUTPATIENT
Start: 2018-06-06 | End: 2018-06-06

## 2018-06-06 RX ORDER — HYDROCODONE BITARTRATE AND ACETAMINOPHEN 5; 325 MG/1; MG/1
1 TABLET ORAL
Qty: 8 TAB | Refills: 0 | Status: SHIPPED | OUTPATIENT
Start: 2018-06-06

## 2018-06-06 RX ORDER — ONDANSETRON 2 MG/ML
INJECTION INTRAMUSCULAR; INTRAVENOUS
Status: COMPLETED
Start: 2018-06-06 | End: 2018-06-06

## 2018-06-06 RX ORDER — BENZOYL PEROXIDE 10 G/100G
GEL TOPICAL
Qty: 28 G | Refills: 0 | Status: SHIPPED | OUTPATIENT
Start: 2018-06-06

## 2018-06-06 RX ORDER — SULFAMETHOXAZOLE AND TRIMETHOPRIM 800; 160 MG/1; MG/1
1 TABLET ORAL
Status: COMPLETED | OUTPATIENT
Start: 2018-06-06 | End: 2018-06-06

## 2018-06-06 RX ORDER — ACETAMINOPHEN 500 MG
1000 TABLET ORAL
Status: COMPLETED | OUTPATIENT
Start: 2018-06-06 | End: 2018-06-06

## 2018-06-06 RX ORDER — SULFAMETHOXAZOLE AND TRIMETHOPRIM 800; 160 MG/1; MG/1
1 TABLET ORAL 2 TIMES DAILY
Qty: 20 TAB | Refills: 0 | Status: SHIPPED | OUTPATIENT
Start: 2018-06-06 | End: 2018-06-16

## 2018-06-06 RX ORDER — HYDROMORPHONE HCL IN 0.9% NACL 50 MG/50ML
1 PLASTIC BAG, INJECTION (ML) INJECTION ONCE
Status: COMPLETED | OUTPATIENT
Start: 2018-06-06 | End: 2018-06-06

## 2018-06-06 RX ORDER — CEPHALEXIN 500 MG/1
1000 CAPSULE ORAL 3 TIMES DAILY
Qty: 42 CAP | Refills: 0 | Status: SHIPPED | OUTPATIENT
Start: 2018-06-06 | End: 2018-06-13

## 2018-06-06 RX ORDER — HYDROMORPHONE HYDROCHLORIDE 2 MG/ML
1 INJECTION, SOLUTION INTRAMUSCULAR; INTRAVENOUS; SUBCUTANEOUS
Status: DISCONTINUED | OUTPATIENT
Start: 2018-06-06 | End: 2018-06-06 | Stop reason: RX

## 2018-06-06 RX ORDER — ONDANSETRON 4 MG/1
4 TABLET, ORALLY DISINTEGRATING ORAL
Qty: 10 TAB | Refills: 0 | Status: SHIPPED | OUTPATIENT
Start: 2018-06-06

## 2018-06-06 RX ADMIN — ONDANSETRON 4 MG: 2 INJECTION INTRAMUSCULAR; INTRAVENOUS at 01:34

## 2018-06-06 RX ADMIN — Medication 0.5 MG: at 01:33

## 2018-06-06 RX ADMIN — SODIUM CHLORIDE 1000 ML: 900 INJECTION, SOLUTION INTRAVENOUS at 01:34

## 2018-06-06 RX ADMIN — CEFTRIAXONE 1 G: 1 INJECTION, POWDER, FOR SOLUTION INTRAMUSCULAR; INTRAVENOUS at 04:03

## 2018-06-06 RX ADMIN — HYDROMORPHONE HYDROCHLORIDE 0.5 MG: 2 INJECTION, SOLUTION INTRAMUSCULAR; INTRAVENOUS; SUBCUTANEOUS at 03:01

## 2018-06-06 RX ADMIN — SULFAMETHOXAZOLE AND TRIMETHOPRIM 1 TABLET: 800; 160 TABLET ORAL at 04:02

## 2018-06-06 RX ADMIN — IOPAMIDOL 94 ML: 612 INJECTION, SOLUTION INTRAVENOUS at 02:21

## 2018-06-06 RX ADMIN — ACETAMINOPHEN 1000 MG: 500 TABLET, FILM COATED ORAL at 04:02

## 2018-06-06 NOTE — ED PROVIDER NOTES
HPI Comments: Sandra El is a 56 y.o. Female with h/o chronic low back pain, recurrent abd pain with c/o slipping from chair, hitting head and back tonight because she could not get up. C/o severe pain in head, and increased pain in right lower abd with increased swelling, bleeding from rash there. Seen for same 3 days ago with neg ct. Had lidocaine patch placed with no sig relief. Constant burning in rlq. Throbbing pain in back of head with no bleeding, swelling    The history is provided by the patient and medical records. Past Medical History:   Diagnosis Date    Bilateral knee pain     Cirrhosis of liver (HCC)     Depression     Diabetes ([facility] [address]     Diastolic hypertension     Fatty liver     Gastritis     Hypertension     Ill-defined condition     Inflammation of lymphatics     Liver disease     cirrhosis    Menorrhagia     HANKS (nonalcoholic steatohepatitis)     Nausea     Osteoarthritis     Right flank pain     RUQ abdominal pain     Sleep apnea     Does not use consistently    Thrombocytopenia (HCC)        Past Surgical History:   Procedure Laterality Date    ENDOSCOPY, COLON, DIAGNOSTIC  5/20/2013    HX HYSTERECTOMY      HX HYSTERECTOMY      HX OTHER SURGICAL      liver biopsy    HX OVARIAN CYST REMOVAL      Bilateral         History reviewed. No pertinent family history. Social History     Social History    Marital status:      Spouse name: N/A    Number of children: N/A    Years of education: N/A     Occupational History    Not on file.      Social History Main Topics    Smoking status: Never Smoker    Smokeless tobacco: Never Used    Alcohol use No    Drug use: No    Sexual activity: Yes     Partners: Male     Birth control/ protection: None     Other Topics Concern    Blood Transfusions Yes     prior to hysterectomy    Occupational Exposure No    Hobby Hazards No    Stress Concern No    Weight Concern Yes    Exercise No    Seat Belt Yes    Self-Exams No     Social History Narrative         ALLERGIES: Morphine    Review of Systems   Constitutional: Positive for fever. HENT: Negative for sore throat and trouble swallowing. Eyes: Negative for visual disturbance. Respiratory: Negative for cough and shortness of breath. Cardiovascular: Negative for chest pain. Gastrointestinal: Positive for abdominal pain. Endocrine: Negative for polyuria. Genitourinary: Negative for dysuria. Musculoskeletal: Positive for back pain and gait problem. Skin: Positive for rash. Allergic/Immunologic: Negative for immunocompromised state. Neurological: Positive for headaches. Negative for syncope. Psychiatric/Behavioral: Positive for sleep disturbance. Vitals:    06/06/18 0130 06/06/18 0300 06/06/18 0315 06/06/18 0332   BP: 99/42 129/67 123/50    Pulse:  97 99    Resp:  24 27    Temp:    (!) 100.7 °F (38.2 °C)   SpO2: 100% 98% 98%    Weight:                Physical Exam   Constitutional: She is oriented to person, place, and time. She appears well-developed and well-nourished. She appears distressed. HENT:   Head: Normocephalic and atraumatic. Head is without Alberto's sign, without abrasion and without contusion. Hair is normal.       Right Ear: External ear normal. No mastoid tenderness. No hemotympanum. Left Ear: External ear normal. No mastoid tenderness. No hemotympanum. Nose: Nose normal.   Mouth/Throat: Uvula is midline, oropharynx is clear and moist and mucous membranes are normal.   Eyes: Conjunctivae and EOM are normal. Pupils are equal, round, and reactive to light. No scleral icterus. Neck: Normal range of motion. Neck supple. Cardiovascular: Normal rate, regular rhythm, normal heart sounds and intact distal pulses. Gallop: crying in pain. Pulmonary/Chest: Effort normal and breath sounds normal. No respiratory distress. She has no wheezes. Abdominal: Soft. She exhibits no distension. There is tenderness. Musculoskeletal: She exhibits no edema. Neurological: She is alert and oriented to person, place, and time. Gait normal.   Skin: Skin is warm and dry. She is not diaphoretic. Psychiatric: Her behavior is normal.   Nursing note and vitals reviewed.        [facility]      ED Course       Procedures      Vitals:  Patient Vitals for the past 12 hrs:   Temp Pulse Resp BP SpO2   06/06/18 0332 (!) 100.7 °F (38.2 °C) - - - -   06/06/18 0315 - 99 27 123/50 98 %   06/06/18 0300 - 97 24 129/67 98 %   06/06/18 0130 - - - 99/42 100 %   06/06/18 0115 - - - 129/51 -   06/06/18 0100 - - - 132/62 100 %   06/06/18 0059 98.3 °F (36.8 °C) (!) 104 18 137/53 100 %         Medications ordered:   Medications   HYDROmorphone (DILAUDID) 1 mg/mL injection (0 mg  Held 6/6/18 0130)   HYDROmorphone (DILAUDID) 1 mg/mL injection (not administered)   cefTRIAXone (ROCEPHIN) 1 g in 0.9% sodium chloride (MBP/ADV) 50 mL MBP (1 g IntraVENous New Bag 6/6/18 0403)   sodium chloride 0.9 % bolus infusion 1,000 mL (0 mL IntraVENous IV Completed 6/6/18 0234)   ondansetron (ZOFRAN) injection 4 mg (4 mg IntraVENous Given 6/6/18 0134)   HYDROmorphone in NS (DILAUDID) injection 1 mg (0.5 mg IntraVENous Given 6/6/18 0133)   iopamidol (ISOVUE 300) 61 % contrast injection 100 mL (94 mL IntraVENous Given 6/6/18 0221)   HYDROmorphone (DILAUDID) injection 0.5 mg (0.5 mg IntraVENous Given 6/6/18 0301)   trimethoprim-sulfamethoxazole (BACTRIM DS, SEPTRA DS) 160-800 mg per tablet 1 Tab (1 Tab Oral Given 6/6/18 0402)   acetaminophen (TYLENOL) tablet 1,000 mg (1,000 mg Oral Given 6/6/18 0402)         Lab findings:  Recent Results (from the past 12 hour(s))   CBC WITH AUTOMATED DIFF    Collection Time: 06/06/18  1:15 AM   Result Value Ref Range    WBC 11.8 4.6 - 13.2 K/uL    RBC 3.21 (L) 4.20 - 5.30 M/uL    HGB 11.6 (L) 12.0 - 16.0 g/dL    HCT 33.7 (L) 35.0 - 45.0 %    .0 (H) 74.0 - 97.0 FL    MCH 36.1 (H) 24.0 - 34.0 PG    MCHC 34.4 31.0 - 37.0 g/dL    RDW 16.3 (H) 11.6 - 14.5 %    PLATELET 49 (L) 152 - 420 K/uL    MPV 9.5 9.2 - 11.8 FL    NEUTROPHILS 75 (H) 40 - 73 %    LYMPHOCYTES 9 (L) 21 - 52 %    MONOCYTES 15 (H) 3 - 10 %    EOSINOPHILS 1 0 - 5 %    BASOPHILS 0 0 - 2 %    ABS. NEUTROPHILS 8.9 (H) 1.8 - 8.0 K/UL    ABS. LYMPHOCYTES 1.0 0.9 - 3.6 K/UL    ABS. MONOCYTES 1.7 (H) 0.05 - 1.2 K/UL    ABS. EOSINOPHILS 0.1 0.0 - 0.4 K/UL    ABS. BASOPHILS 0.0 0.0 - 0.06 K/UL    DF AUTOMATED     METABOLIC PANEL, COMPREHENSIVE    Collection Time: 06/06/18  1:15 AM   Result Value Ref Range    Sodium 136 136 - 145 mmol/L    Potassium 3.9 3.5 - 5.5 mmol/L    Chloride 102 100 - 108 mmol/L    CO2 26 21 - 32 mmol/L    Anion gap 8 3.0 - 18 mmol/L    Glucose 206 (H) 74 - 99 mg/dL    BUN 10 7.0 - 18 MG/DL    Creatinine 0.88 0.6 - 1.3 MG/DL    BUN/Creatinine ratio 11 (L) 12 - 20      GFR est AA >60 >60 ml/min/1.73m2    GFR est non-AA >60 >60 ml/min/1.73m2    Calcium 8.6 8.5 - 10.1 MG/DL    Bilirubin, total 8.4 (H) 0.2 - 1.0 MG/DL    ALT (SGPT) 27 13 - 56 U/L    AST (SGOT) 52 (H) 15 - 37 U/L    Alk.  phosphatase 122 (H) 45 - 117 U/L    Protein, total 6.9 6.4 - 8.2 g/dL    Albumin 2.3 (L) 3.4 - 5.0 g/dL    Globulin 4.6 (H) 2.0 - 4.0 g/dL    A-G Ratio 0.5 (L) 0.8 - 1.7     LIPASE    Collection Time: 06/06/18  1:15 AM   Result Value Ref Range    Lipase 200 73 - 393 U/L   URINALYSIS W/ RFLX MICROSCOPIC    Collection Time: 06/06/18  3:23 AM   Result Value Ref Range    Color ORANGE      Appearance CLEAR      Specific gravity >1.030 (H) 1.005 - 1.030    pH (UA) 5.5 5.0 - 8.0      Protein TRACE (A) NEG mg/dL    Glucose 100 (A) NEG mg/dL    Ketone NEGATIVE  NEG mg/dL    Bilirubin MODERATE (A) NEG      Blood TRACE (A) NEG      Urobilinogen 2.0 (H) 0.2 - 1.0 EU/dL    Nitrites POSITIVE (A) NEG      Leukocyte Esterase MODERATE (A) NEG     URINE MICROSCOPIC ONLY    Collection Time: 06/06/18  3:23 AM   Result Value Ref Range    WBC 4 to 10 0 - 4 /hpf    RBC 0 to 3 0 - 5 /hpf    Epithelial cells FEW 0 - 5 /lpf Bacteria 1+ (A) NEG /hpf    Mucus FEW (A) NEG /lpf       EKG interpretation by ED Physician:      X-Ray, CT or other radiology findings or impressions:  CT HEAD WO CONT    (Results Pending)   CT ABD PELV W CONT    (Results Pending)   nothing acute on ct per prelim reading    Progress notes, Consult notes or additional Procedure notes:   Suspect possible early abscess, cellulitis on abd, uti  Will place on oral abx, doubt need for incision at this time  I have discussed with patient and/or family/sig other the results, interpretation of any imaging if performed, suspected diagnosis and treatment plan to include instructions regarding the diagnoses listed to which understanding was expressed with all questions answered      Reevaluation of patient:   stable    Disposition:  Diagnosis:   1. Cellulitis, abdominal wall    2. Fall from chair, initial encounter    3. Closed head injury, initial encounter    4. Acute UTI        Disposition: home      Follow-up Information     Follow up With Details Comments Contact Info    Shahnaz Ayon MD Schedule an appointment as soon as possible for a visit this week for recheck [id number] [facility]  [phone]              Patient's Medications   Start Taking    BENZOYL PEROXIDE 10 % TOPICAL GEL    apply to affected area on abdominal wall twice daily after cleaning with soap and water until healed    CEPHALEXIN (KEFLEX) 500 MG CAPSULE    Take 2 Caps by mouth three (3) times daily for 7 days. HYDROCODONE-ACETAMINOPHEN (NORCO) 5-325 MG PER TABLET    Take 1 Tab by mouth every six (6) hours as needed for Pain. Max Daily Amount: 4 Tabs. TRIMETHOPRIM-SULFAMETHOXAZOLE (BACTRIM DS, SEPTRA DS) 160-800 MG PER TABLET    Take 1 Tab by mouth two (2) times a day for 10 days. Continue Taking    CLOTRIMAZOLE (LOTRIMIN) 1 % TOPICAL CREAM    Apply a thin layer over affected area twice daily for up to 14 days.     GABAPENTIN (NEURONTIN) 300 MG CAPSULE    Take 300 mg by mouth daily. LACTULOSE (CHRONULAC) 10 GRAM/15 ML SOLUTION    Take 45 mL by mouth three (3) times daily as needed. LIDOCAINE (LIDODERM) 5 %    Apply patch to the affected area for 12 hours a day and remove for 12 hours a day. METFORMIN (GLUCOPHAGE) 500 MG TABLET    Take 500 mg by mouth two (2) times daily (with meals). SERTRALINE (ZOLOFT) 50 MG TABLET    TAKE 1 TABLET BY MOUTH EVERY DAY   These Medications have changed    Modified Medication Previous Medication    ONDANSETRON (ZOFRAN ODT) 4 MG DISINTEGRATING TABLET ondansetron (ZOFRAN ODT) 4 mg disintegrating tablet       Take 1 Tab by mouth every eight (8) hours as needed for Nausea. Take 1 Tab by mouth every eight (8) hours as needed for Nausea. Stop Taking    CYCLOBENZAPRINE (FLEXERIL) 10 MG TABLET    Take 1 Tab by mouth three (3) times daily as needed. OXYCODONE-ACETAMINOPHEN (PERCOCET) 5-325 MG PER TABLET    Take 1 Tab by mouth every four (4) hours as needed for Pain. Max Daily Amount: 6 Tabs. TRIMETHOPRIM-POLYMYXIN B (POLYTRIM) OPHTHALMIC SOLUTION    Administer 1 Drop to both eyes every four (4) hours.

## 2018-06-06 NOTE — LETTER
NOTIFICATION RETURN TO WORK / SCHOOL 
 
6/6/2018 4:02 AM 
 
Ms. Kimberley Chin [facility] [address] [facility] [address] [id number] To Whom It May Concern: 
 
Kimberley Chin is currently under the care of [facility] EMERGENCY DEPT. Please excuse her daughter, Seferino Osborne who was with her in ER from work for today If there are questions or concerns please have the patient contact our office. Sincerely, Angelica Cedeno MD

## 2018-06-06 NOTE — ED TRIAGE NOTES
Pt arrived by EMS. Per EMS patient was sitting in chair with back pain and when she went to get up she slid to the floor. Patient reports hitting her head and her head is sensitive to the touch.  NO LOC

## 2018-09-13 NOTE — IP AVS SNAPSHOT
Current Discharge Medication List  
  
START taking these medications Dose & Instructions Dispensing Information Comments Morning Noon Evening Bedtime  
 potassium chloride SR 10 mEq tablet Commonly known as:  KLOR-CON 10 Your last dose was: Your next dose is:    
   
   
 Dose:  10 mEq Take 1 Tab by mouth daily. Quantity:  30 Tab Refills:  0  
     
   
   
   
  
 spironolactone 50 mg tablet Commonly known as:  ALDACTONE Your last dose was: Your next dose is:    
   
   
 Dose:  50 mg Take 1 Tab by mouth daily. Quantity:  30 Tab Refills:  0 CONTINUE these medications which have CHANGED Dose & Instructions Dispensing Information Comments Morning Noon Evening Bedtime  
 lactulose 10 gram/15 mL solution Commonly known as:  Yaz Martínez What changed:   
- medication strength 
- how much to take - when to take this Your last dose was: Your next dose is:    
   
   
 Dose:  20 g Take 30 mL by mouth three (3) times daily. Quantity:  1 Bottle Refills:  2 CONTINUE these medications which have NOT CHANGED Dose & Instructions Dispensing Information Comments Morning Noon Evening Bedtime  
 clotrimazole 1 % topical cream  
Commonly known as:  Zachary Ruffinon Your last dose was: Your next dose is:    
   
   
 Apply a thin layer over affected area twice daily for up to 14 days. Quantity:  15 g Refills:  2  
     
   
   
   
  
 gabapentin 300 mg capsule Commonly known as:  NEURONTIN Your last dose was: Your next dose is:    
   
   
 Dose:  300 mg Take 300 mg by mouth daily. Refills:  0 JANUVIA 100 mg tablet Generic drug:  SITagliptin Your last dose was: Your next dose is:    
   
   
 Dose:  100 mg Take 100 mg by mouth daily. Refills:  0  
     
   
   
   
  
 lisinopril 20 mg tablet Physician at bedside.     Commonly known as:  Sydna Lies Your last dose was: Your next dose is:    
   
   
 Dose:  20 mg Take 20 mg by mouth daily. Refills:  0  
     
   
   
   
  
 metFORMIN 500 mg tablet Commonly known as:  GLUCOPHAGE Your last dose was: Your next dose is:    
   
   
 Dose:  500 mg Take 500 mg by mouth two (2) times daily (with meals). Refills:  0 Where to Get Your Medications Information on where to get these meds will be given to you by the nurse or doctor. ! Ask your nurse or doctor about these medications  
  lactulose 10 gram/15 mL solution  
 potassium chloride SR 10 mEq tablet  
 spironolactone 50 mg tablet

## 2019-12-04 NOTE — ED PROVIDER NOTES
EMERGENCY DEPARTMENT HISTORY AND PHYSICAL EXAM    1:21 AM      Date: 6/2/2018  Patient Name: Марина Colon    History of Presenting Illness     Chief Complaint   Patient presents with    Abdominal Pain    Dizziness         History Provided By: Patient    Chief Complaint: abd pain   Duration:  3 days  Timing:  Gradual and Worsening  Location: RLQ  Quality: Burning  Severity: Severe  Modifying Factors: at site of lump in RLQ  Associated Symptoms: generalized weakness, dizziness, chills, urinary frequency      Additional History (Context): Марина Colon, a 56 y.o. Female, nonsmoker, non-alcohol drinker with h/o HTN, cirrhosis of liver, gastritis, DM and HTN, allergic to morphine, presents to the ED c/o 3 days of gradual onset, worsening, severe, burning RLQ pain at the site of a lump on her RLQ abd that is associated with urinary frequency, chills, generalized weakness and dizziness but not with n/v/d or fever. Pt has not taken any medication for pain PTA and states that she does not receive rx from pain management. She denies h/o hernia and reports no other complaints/concerns at this time. PCP: Ashley Montano MD    Current Facility-Administered Medications   Medication Dose Route Frequency Provider Last Rate Last Dose    oxyCODONE-acetaminophen (PERCOCET) 5-325 mg per tablet              Current Outpatient Prescriptions   Medication Sig Dispense Refill    lidocaine (LIDODERM) 5 % Apply patch to the affected area for 12 hours a day and remove for 12 hours a day. 1 Each 0    trimethoprim-polymyxin b (POLYTRIM) ophthalmic solution Administer 1 Drop to both eyes every four (4) hours. 10 mL 0    oxyCODONE-acetaminophen (PERCOCET) 5-325 mg per tablet Take 1 Tab by mouth every four (4) hours as needed for Pain. Max Daily Amount: 6 Tabs. 10 Tab 0    cyclobenzaprine (FLEXERIL) 10 mg tablet Take 1 Tab by mouth three (3) times daily as needed.       lactulose (CHRONULAC) 10 gram/15 mL solution Take 45 mL by mouth three (3) times daily as needed. 1    ondansetron (ZOFRAN ODT) 4 mg disintegrating tablet Take 1 Tab by mouth every eight (8) hours as needed for Nausea. 10 Tab 0    sertraline (ZOLOFT) 50 mg tablet TAKE 1 TABLET BY MOUTH EVERY DAY  1    metFORMIN (GLUCOPHAGE) 500 mg tablet Take 500 mg by mouth two (2) times daily (with meals).  clotrimazole (LOTRIMIN) 1 % topical cream Apply a thin layer over affected area twice daily for up to 14 days. 15 g 2    gabapentin (NEURONTIN) 300 mg capsule Take 300 mg by mouth daily. Past History     Past Medical History:  Past Medical History:   Diagnosis Date    Bilateral knee pain     [facility]     Depression     Diabetes ([facility]     Diastolic hypertension     Fatty liver     Gastritis     Hypertension     Ill-defined condition     Inflammation of lymphatics     Liver disease     cirrhosis    Menorrhagia     HANKS (nonalcoholic steatohepatitis)     Nausea     Osteoarthritis     Right flank pain     RUQ abdominal pain     Sleep apnea     Does not use consistently    Thrombocytopenia (HCC)        Past Surgical History:  Past Surgical History:   Procedure Laterality Date    ENDOSCOPY, COLON, DIAGNOSTIC  5/20/2013    HX HYSTERECTOMY      HX HYSTERECTOMY      HX OTHER SURGICAL      liver biopsy    HX OVARIAN CYST REMOVAL      Bilateral       Family History:  History reviewed. No pertinent family history. Social History:  Social History   Substance Use Topics    Smoking status: Never Smoker    Smokeless tobacco: Never Used    Alcohol use No       Allergies: Allergies   Allergen Reactions    Morphine Hives         Review of Systems       Review of Systems   Constitutional: Positive for chills and fatigue. Negative for fever. Generalized weakness   Gastrointestinal: Positive for abdominal pain (RLQ, burning sensation at site of a lump). Negative for diarrhea, nausea and vomiting. Genitourinary: Positive for frequency. Negative for dysuria. Neurological: Positive for dizziness. All other systems reviewed and are negative. Physical Exam     Visit Vitals    /62    Pulse 97    Temp 98.6 °F (37 °C)    Resp 17    Ht 5' 4\" (1.626 m)    Wt 104.3 kg (230 lb)    LMP 04/25/2011    SpO2 99%    BMI 39.48 kg/m2       Physical Exam    Physical exam:  General:  Well-developed, well-nourished, morbidly obese uncomfortable appearing not warm to touch nontoxic nondiaphoretic. Head:  Normocephalic atraumatic. Eyes:  Pupils midrange extraocular movements intact. No pallor or conjunctival injection. Nose:  No rhinorrhea, inspection grossly normal.    Ears:  Grossly normal to inspection, no discharge. Mouth:  Mucous membranes moist, no appreciable intraoral lesion. Neck/Back:  Trachea midline, no asymmetry. Chest:  Grossly normal inspection, symmetric chest rise. Pulmonary:  Clear to auscultation bilaterally no wheezes rhonchi or rales. Cardiovascular:  S1-S2 no murmurs rubs or gallops. Abdomen: Diffusely distended, no guarding rebound or peritoneal signs there is chronic skin changes mostly in the inferior portions of her stomach. She draws my attention to a 4 cm area of induration no overlying skin changes she reports that this is her location of pain and there is persistent burning. Extremities:  Grossly normal to inspection, peripheral pulses intact    Neurologic:  Alert and oriented no appreciable focal neurologic deficit. Skin:  Warm and dry  Psychiatric:  Grossly normal mood and affect. Nursing note reviewed, vital signs reviewed.       Diagnostic Study Results     Labs -  Recent Results (from the past 12 hour(s))   EKG, 12 LEAD, INITIAL    Collection Time: 06/02/18 11:51 PM   Result Value Ref Range    Ventricular Rate 96 BPM    Atrial Rate 96 BPM    P-R Interval 154 ms    QRS Duration 98 ms    Q-T Interval 384 ms    QTC Calculation (Bezet) 485 ms    Calculated P Axis 55 degrees Calculated R Axis -13 degrees    Calculated T Axis 44 degrees    Diagnosis       Normal sinus rhythm  Possible Anterior infarct (cited on or before 02-JUN-2018)  Abnormal ECG  When compared with ECG of 27-APR-2018 16:28,  No significant change was found     CBC WITH AUTOMATED DIFF    Collection Time: 06/03/18 12:30 AM   Result Value Ref Range    WBC 6.0 4.6 - 13.2 K/uL    RBC 3.22 (L) 4.20 - 5.30 M/uL    HGB 11.7 (L) 12.0 - 16.0 g/dL    HCT 34.1 (L) 35.0 - 45.0 %    .9 (H) 74.0 - 97.0 FL    MCH 36.3 (H) 24.0 - 34.0 PG    MCHC 34.3 31.0 - 37.0 g/dL    RDW 15.9 (H) 11.6 - 14.5 %    PLATELET 56 (L) 529 - 420 K/uL    MPV 9.7 9.2 - 11.8 FL    NEUTROPHILS 73 40 - 73 %    LYMPHOCYTES 14 (L) 21 - 52 %    MONOCYTES 11 (H) 3 - 10 %    EOSINOPHILS 1 0 - 5 %    BASOPHILS 1 0 - 2 %    ABS. NEUTROPHILS 4.4 1.8 - 8.0 K/UL    ABS. LYMPHOCYTES 0.8 (L) 0.9 - 3.6 K/UL    ABS. MONOCYTES 0.7 0.05 - 1.2 K/UL    ABS. EOSINOPHILS 0.1 0.0 - 0.4 K/UL    ABS. BASOPHILS 0.0 0.0 - 0.06 K/UL    DF AUTOMATED     METABOLIC PANEL, COMPREHENSIVE    Collection Time: 06/03/18 12:30 AM   Result Value Ref Range    Sodium 138 136 - 145 mmol/L    Potassium 4.2 3.5 - 5.5 mmol/L    Chloride 106 100 - 108 mmol/L    CO2 25 21 - 32 mmol/L    Anion gap 7 3.0 - 18 mmol/L    Glucose 112 (H) 74 - 99 mg/dL    BUN 6 (L) 7.0 - 18 MG/DL    Creatinine 0.75 0.6 - 1.3 MG/DL    BUN/Creatinine ratio 8 (L) 12 - 20      GFR est AA >60 >60 ml/min/1.73m2    GFR est non-AA >60 >60 ml/min/1.73m2    Calcium 8.2 (L) 8.5 - 10.1 MG/DL    Bilirubin, total 5.0 (H) 0.2 - 1.0 MG/DL    ALT (SGPT) 32 13 - 56 U/L    AST (SGOT) 62 (H) 15 - 37 U/L    Alk.  phosphatase 126 (H) 45 - 117 U/L    Protein, total 7.4 6.4 - 8.2 g/dL    Albumin 2.2 (L) 3.4 - 5.0 g/dL    Globulin 5.2 (H) 2.0 - 4.0 g/dL    A-G Ratio 0.4 (L) 0.8 - 1.7     LIPASE    Collection Time: 06/03/18 12:30 AM   Result Value Ref Range    Lipase 267 73 - 393 U/L   MAGNESIUM    Collection Time: 06/03/18 12:30 AM   Result Value Ref Range    Magnesium 1.7 1.6 - 2.6 mg/dL       Radiologic Studies -   CT ABD PELV WO CONT    (Results Pending)     CT ABD PELV WO CONT  -------------------------------------------------  Preliminary Reading   Cholelithiasis- Normal appendix- Cirrhosis with collateral vessels. - Anasarca- Significant noise artifact     Medical Decision Making   I am the first provider for this patient. I reviewed the vital signs, available nursing notes, past medical history, past surgical history, family history and social history. Vital Signs-Reviewed the patient's vital signs. Pulse Oximetry Analysis -  97% on room air (Interpretation) Non-hypoxic     Cardiac Monitor:  Rate: 97  Rhythm:  Normal Sinus Rhythm     EKG: Interpreted by the EP. Time Interpreted: 23:51   Rate: 96   Rhythm: Normal Sinus Rhythm    Interpretation: No ectopy       Records Reviewed: Nursing Notes and Old Medical Records (Time of Review: 1:21 AM)    ED Course: Progress Notes, Reevaluation, and Consults:    ED course:  Patient with very frequent presentations for abdominal pain presents with RIGHT lower quadrant abdominal pain, seems to be more skin findings, I am unable to palpate the base of this and cannot rule out a incarcerated hernia clinically. We'll plan for CT, pain medication by mouth, basic labs and monitor. Labs unremarkable, LFTs unremarkable    CT per radiology unremarkable, patient reevaluated has been sleeping comfortably, her abdomen is benign. We'll discharge with outpatient follow-up, strict return precautions, trial of lidocaine patch to the abdomen as she reports her pain is mostly burning over that indurated skin. The patient's history, physical and laboratory evaluations were reviewed. At this time I can find no definitive cause for the patient's presentation and current information is reassuring.    I feel the patient is stable for outpatient evaluation and cannot reasonably expect the patient to decompensate before follow-up with primary care physician/specialist.  Patient was informed about the diagnostic uncertainty of ED evaluation, red flags were discussed and patient was instructed to return with any concerns. Disposition:    Discharged home    Portions of this chart were created with Dragon medical speech to text program.   Unrecognized errors may be present. Diagnosis     Clinical Impression:   1. Abdominal pain, right lower quadrant        Disposition: Discharged    Follow-up Information     Follow up With Details Comments Contact Info    Artemio Campbell MD Call in 2 days  [address] [facility] [address] [facility] EMERGENCY DEPT  As needed, If symptoms worsen [address]  [phone]           Discharge Medication List as of 6/3/2018  4:16 AM      START taking these medications    Details   lidocaine (LIDODERM) 5 % Apply patch to the affected area for 12 hours a day and remove for 12 hours a day., Print, Disp-1 Each, R-0         CONTINUE these medications which have NOT CHANGED    Details   trimethoprim-polymyxin b (POLYTRIM) ophthalmic solution Administer 1 Drop to both eyes every four (4) hours. , Print, Disp-10 mL, R-0      oxyCODONE-acetaminophen (PERCOCET) 5-325 mg per tablet Take 1 Tab by mouth every four (4) hours as needed for Pain. Max Daily Amount: 6 Tabs., Print, Disp-10 Tab, R-0      cyclobenzaprine (FLEXERIL) 10 mg tablet Take 1 Tab by mouth three (3) times daily as needed., Historical Med      lactulose (CHRONULAC) 10 gram/15 mL solution Take 45 mL by mouth three (3) times daily as needed., Historical Med, R-1      ondansetron (ZOFRAN ODT) 4 mg disintegrating tablet Take 1 Tab by mouth every eight (8) hours as needed for Nausea. , Print, Disp-10 Tab, R-0      sertraline (ZOLOFT) 50 mg tablet TAKE 1 TABLET BY MOUTH EVERY DAY, Historical Med, R-1      metFORMIN (GLUCOPHAGE) 500 mg tablet Take 500 mg by mouth two (2) times daily (with meals). , Historical Med      clotrimazole (LOTRIMIN) 1 % topical cream Apply a thin layer over affected area twice daily for up to 14 days. , Normal, Disp-15 g, R-2      gabapentin (NEURONTIN) 300 mg capsule Take 300 mg by mouth daily. , Historical Med           _______________________________    Attestations:  Scribe Attestation     Janie Beverly acting as a scribe for and in the presence of Brittni Talley MD      June 03, 2018 at 1:21 AM       Provider Attestation:      I personally performed the services described in the documentation, reviewed the documentation, as recorded by the scribe in my presence, and it accurately and completely records my words and actions.  June 03, 2018 at 1:21 AM - Brittni Talley MD    _______________________________ Curvilinear Excision Additional Text (Leave Blank If You Do Not Want): The margin was drawn around the clinically apparent lesion.  A curvilinear shape was then drawn on the skin incorporating the lesion and margins.  Incisions were then made along these lines to the appropriate tissue plane and the lesion was extirpated.

## 2022-12-01 NOTE — PROGRESS NOTES
[address] Quigley MD, [address], [place], [place]       Toro Macias, DONNA Henry President, [facility]   TRICIA Knox NP        at [address], [address] [address]     [phone]     FAX: [phone]    at [address], [address] Located within [facility], [address]     [phone]     FAX: [phone]       Patient Care Team:  Guillermina Wade MD as PCP - General (Internal Medicine)  Thomas Zaragoza MD (General Surgery)  Kelle Harris MD (Hematology and Oncology)  Janak Willoughby MD (Gastroenterology)      Problem List  Date Reviewed: 8/27/2017          Codes Class Noted    Thrombocytopenia ([facility] ICD-10-CM: D69.6  ICD-9-CM: 287.5  8/27/2017        Ascites ICD-10-CM: R18.8  ICD-9-CM: 789.59  5/19/2017        Diabetes mellitus type 2, controlled ([facility] ICD-10-CM: E11.9  ICD-9-CM: 250.00  1/31/2017        HTN (hypertension) ICD-10-CM: I10  ICD-9-CM: 401.9  1/31/2017        Obesity ICD-10-CM: E66.9  ICD-9-CM: 278.00  1/31/2017        Candidal intertrigo ICD-10-CM: B37.2  ICD-9-CM: 112.3  7/28/2014        Acalculous cholecystitis ICD-10-CM: K81.9  ICD-9-CM: 575.10  2/27/2014        Cirrhosis ([facility] ICD-10-CM: K74.60  ICD-9-CM: 571.5  2/27/2014        Acquired absence of genital organ ICD-10-CM: Z90.79  ICD-9-CM: V45.77  4/30/2012              Zofia Damon returns to the [facility] for management cirrhosis which is thought to be secondary to HANKS. The active problem list, all pertinent past medical history, medications, radiologic findings and laboratory findings related to the liver disorder were reviewed with the patient. The patient is a 49 y.o. female who was found to have chronic liver disease in 2011. She has a BMI of 50 which is stable compared to her last appointment. A liver biopsy in 7/2011.   This demonstrated HANKS with cirrhosis. The patient has not developed any major complications of cirrhosis to date. She has small varices by EGD from 1/2017. Hepatic encephalopathy has recently developed. This is mild. Will start low dose lactulose     She cannot undergo gastric sleeve because of cirrhosis. She has severe arthritis of the knees and cannot walk. She cannot undergo knee replacement because of Cirrhosis. The patient has had falls and confusion. It is not clear if this is due to hepatic encephalopathy. The patient notes fatigue, pain in the right side over the liver, and problems concentrating,     The patient has limitations in functional activities secondary to these symptoms. ALLERGIES  No Known Allergies    MEDICATIONS  Current Outpatient Prescriptions   Medication Sig    sertraline (ZOLOFT) 50 mg tablet TAKE 1 TABLET BY MOUTH EVERY DAY    metFORMIN (GLUCOPHAGE) 500 mg tablet Take 500 mg by mouth two (2) times daily (with meals).  SITagliptin (JANUVIA) 100 mg tablet Take 100 mg by mouth daily.  lisinopril (PRINIVIL, ZESTRIL) 20 mg tablet Take 20 mg by mouth daily.  clotrimazole (LOTRIMIN) 1 % topical cream Apply a thin layer over affected area twice daily for up to 14 days.  gabapentin (NEURONTIN) 300 mg capsule Take 300 mg by mouth daily.  meclizine (ANTIVERT) 25 mg tablet Take  by mouth three (3) times daily as needed.  lactulose (CHRONULAC) 10 gram/15 mL solution Take  by mouth three (3) times daily.  furosemide (LASIX) 20 mg tablet Take 1 Tab by mouth daily.  spironolactone (ALDACTONE) 50 mg tablet TAKE 1 TABLET BY MOUTH EVERY DAY    oxyCODONE IR (ROXICODONE) 5 mg immediate release tablet Take 1 Tab by mouth every six (6) hours as needed for Pain. Max Daily Amount: 20 mg.    ondansetron (ZOFRAN ODT) 4 mg disintegrating tablet Take 1 Tab by mouth every eight (8) hours as needed for Nausea.      No current facility-administered medications for this visit.        SYSTEM REVIEW NOT RELATED TO LIVER DISEASE OR REVIEWED ABOVE:  Constitution systems: Negative for fever, chills, weight gain, weight loss. Eyes: Negative for visual changes. ENT: Negative for sore throat, painful swallowing. Respiratory: Negative for cough, hemoptysis, SOB. Cardiology: Negative for chest pain, palpitations. GI:  Negative for constipation or diarrhea. : Negative for urinary frequency, dysuria, hematuria, nocturia. Skin: Negative for rash. Hematology: Negative for easy bruising, blood clots. Musculo-skelatal: Negative for back pain, muscle pain, weakness. Neurologic: Negative for headaches, dizziness, vertigo, memory problems not related to HE. Psychology: Negative for anxiety, depression. FAMILY HISTORY:  There is no family history of liver disease. SOCIAL HISTORY:  The patient is . The patient has 6 children  The patient has never used tobacco products. The patient has never consumed significant amounts of alcohol. The patient currently receiving disability. PHYSICAL EXAMINATION:  Visit Vitals    /68 (BP 1 Location: Left arm, BP Patient Position: Sitting)    Pulse 92    Temp 97.3 °F (36.3 °C) (Oral)    Resp 16    Ht 5' 4\" (1.626 m)    Wt 285 lb (129.3 kg)    LMP 04/25/2011    SpO2 98%    BMI 48.92 kg/m2     General: No acute distress. Massive obesity. Eyes: Sclera mildly icteric. ENT: No oral lesions. Thyroid normal.  Nodes: No adenopathy. Skin: Spider angiomata. No jaundice. No palmar erythema. Respiratory: Lungs clear to auscultation. Cardiovascular: Regular heart rate. No murmurs. No JVD. Abdomen: Obese. Soft non-tender. Liver size normal to percussion/palpation. Spleen not palpable. No obvious ascites. Extremities: Trace edema. No muscle wasting. No gross arthritic changes. Neurologic: Alert and oriented. Cranial nerves grossly intact. Mild tremor with outstretched hands.     LABORATORY STUDIES:  Liver [place] of [facility] & Units 8/20/2017 6/12/2017   WBC 4.6 - 13.2 K/uL 3.8 (L) 4.2   ANC 1.8 - 8.0 K/UL 2.2 2.4   HGB 12.0 - 16.0 g/dL 10.9 (L) 11.5    - 420 K/uL 53 (L) 57 (LL)   INR 0.8 - 1.2   1.5 (H) 1.3 (H)   AST 15 - 37 U/L 43 (H) 65 (H)   ALT 13 - 56 U/L 27 31   Alk Phos 45 - 117 U/L 95 130 (H)   Bili, Total 0.2 - 1.0 MG/DL 2.8 (H) 6.0 (H)   Bili, Direct 0.0 - 0.2 MG/DL  1.96 (H)   Albumin 3.4 - 5.0 g/dL 2.2 (L) 2.6 (L)   BUN 7.0 - 18 MG/DL 6 (L) 5 (L)   Creat 0.6 - 1.3 MG/DL 0.65 0.49 (L)   Na 136 - 145 mmol/L 139 138   K 3.5 - 5.5 mmol/L 3.7 4.0   Cl 100 - 108 mmol/L 105 100   CO2 21 - 32 mmol/L 27 25   Glucose 74 - 99 mg/dL 204 (H) 194 (H)   Magnesium 1.6 - 2.6 mg/dL 1.4 (L)    Ammonia 11 - 32 UMOL/L 90 (H)      SEROLOGIES:  Serologies Latest Ref Rng & Units 6/12/2017 7/28/2014   Hep B Surface Ag Negative  Negative   Hep C Ab 0.0 - 0.9 s/co ratio  <0.1   Ferritin 15 - 150 ng/mL 327 (H)    Iron % Saturation 15 - 55 % >90 (HH)    Alpha-1 antitrypsin level 90 - 200 mg/dL 134      LIVER HISTOLOGY:  7/2011. HANKS with Cirrhosis. Biopsy slides not reviewed by MLS. ENDOSCOPIC PROCEDURES:  1/2017. EGD by Dr Charleen Julien. Small esophageal varices. RADIOLOGY:  11/2016. Ultrasound of liver. Echogenic consistent with cirrhosis. No liver mass lesions. No dilated bile ducts. No ascites. OTHER TESTING:  Not available or performed    ASSESSMENT AND PLAN:  Cirrhosis secondary to HANKS. The patient has never developed any complications of cirrhosis to date. The CTP score is 8. Child class B, MELD 15. The patient has significant symptoms from cirrhosis despite having a MELD score of 15. The patient is not a candidate for liver transplantation because of severe obesity. We would be happy to refer her to the LT center at [facility] but we knoiw they will want her to loose weight to a BMI under 45. Hepatic encephalopathy is well controlled on low dose lactulose. EGD is being performed on a regular basis by GI Dr. Kingsley Cox. The patient was directed to continue all current medications at the current dosages. There are no contraindications for the patient to take any medications that are necessary for treatment of other medical issues. The patient was counseled regarding diet and exercise to achieve weight loss. The best diet for patients with fatty liver is one very low in carbohydrates and enriched with protein such as an [place] program.      She is not a candidate for gastric bypass because of cirrhosis. Thrombocytopenia secondary to cirrhosis. There is no evidence of overt bleeding. There is no indication for platelet transfusions or pharmacologic treatment to increase the platelet count. [address] screening has recently been performed and does not suggest [address] The next liver imaging study will be performed in May 2017. All of the above issues were discussed with the patient. All questions were answered. The patient expressed a clear understanding of the above. [facility] in 3 months.     Miguel Mathias MD  Liver [place] of [facility] [address], [address] [place] [address] Street [address]  [phone] MED/SURG

## 2024-11-15 NOTE — PROGRESS NOTES
[facility]   Discharge Planning/ Assessment    Reasons for Intervention: Spoke with pt, demographics correct. Lives with her dtr. Needs help with adls. amb only few steps asking  About  A uai for adi personal care. pcp dr Lolly Noland. Has adi  No snf benefit . Plan  Home with hh and poss peronal care.     High Risk Criteria  [x] Yes  []No   Physician Referral  [] Yes  [x]No        Date    Nursing Referral  [] Yes  [x]No        Date    Patient/Family Request  [] Yes  [x]No        Date       Resources:    Medicare  [] Yes  []No   Medicaid  [x] Yes  []No   No Resources  [] Yes  []No   Private Insurance  [] Yes  []No    Name/Phone Number    Other  [] Yes  []No        (i.e. Workman's Comp)         Prior Services:    Prior Services  [] Yes  [x]No   Home Health  [] Yes  []No   [address] [place]  [] Yes  []No        Number of [address]  [] Yes  []No       Meals on Wheels  [] Yes  []No   Office on Aging  [] Yes  []No   Transportation Services  [] Yes  []No   Nursing Home  [] Yes  []No        Nursing Home Name    [address]  [] Yes  []No        [address] Name    Other       Information Source:      Information obtained from  [x] Patient  [] Parent   [] [address]  [] Child  [] Spouse   [] Significant Other/Partner   [] Friend      [] EMS    [] Nursing Home Chart          [] Other:   Chart Review  [] Yes  []No     Family/Support System:    Patient lives with  [] Alone    [] Spouse   [] Significant Other  [x] Children  [] Caretaker   [] Parent  [] Sibling     [] Other       Other Support System:    Is the patient responsible for care of others  [] Yes  [x]No   Information of person caring for patient on  discharge    Managers financial affairs independently  [x] Yes  []No   If no, explain:      Status Prior to Admission:    Mental Status  [] Awake  [] Alert  [x] Oriented  [] Quiet/Calm [] Lethargic/Sedated   [] Disoriented  [] Restless/Anxious  [] Combative   Personal Care  [] Dependent  [] Independent Personal Care  [x] Requires Assistance   Meal Preparation Ability  [] Independent   [] Standby Assistance   [x] Minimal Assistance   [] Moderate Assistance  [] Maximum Assistance     [] Total Assistance   Chores  [] Independent with Chores   [] N/A Nursing Home Resident   [x] Requires Assistance   Bowel/Bladder  [] Continent  [] Catheter  [] Incontinent  [] Ostomy Self-Care    [] Urine Diversion Self-Care  [] Maximum Assistance     [] Total Assistance   Number of Persons needed for assistance    DME at home  [] Deneise Boots, Austin Bridger  [] Deneise Boots, Straight   [] Commode    [] Bathroom/Grab Bars  [] Hospital Bed  [] Nebulizer  [] Oxygen           [] Raised Toilet Seat  [] Shower Chair  [] Side Rails for Bed   [] Tub Transfer Bench   [] Sable [place]  [] Nay Needle, Standard      [] Other:   Vendor      Treatment Presently Receiving:    Current Treatments  [] Chemotherapy  [] Dialysis  [] Insulin  [] IVAB [] IVF   [] O2  [] PCA   [] PT   [] RT   [] Tube Feedings   [] Wound Care     Psychosocial Evaluation:    Verbalized Knowledge of Disease Process  [x] Patient  []Family   Coping with Disease Process  [] Patient  []Family   Requires Further Counseling Coping with Disease Process  [] Patient  []Family     Identified Projected Needs:    Home Health Aid  [x] Yes  []No   Transportation  [] Yes  [x]No   Education  [] Yes  [x]No        Specific Education     Financial Counseling  [] Yes  [x]No   Inability to Care for Self/Will Require 24 hour care  [] Yes  [x]No   Pain Management  [] Yes  [x]No   Home Infusion Therapy  [] Yes  [x]No   Oxygen Therapy  [] Yes  [x]No   DME  [] Yes  [x]No   Long Term Care Placement  [] Yes  [x]No   Rehab  [] Yes  [x]No   Physical Therapy  [x] Yes  []No   Needs Anticipated At This Time  [x] Yes  []No     Intra-Hospital Referral:    [facility]  [] Yes  [x]No     [] Yes  [x]No   Patient Representative  [] Yes  [x]No   Staff for Teaching Needs [] Yes  [x]No   Specialty Teaching Needs     Diabetic Educator  [] Yes  [x]No   Referral for Diabetic Educator Needed  [] Yes  [x]No  If Yes, place order for Nutritionist or Diabetic Consult     Tentative Discharge Plan:    Home with No Services  [] Yes  []No   Home with Home Health Follow-up  [x] Yes  []No        If Yes, specify type    [address]  [] Yes  []No        If Yes, specify type    Meals on Wheels  [] Yes  []No   Office of Aging  [] Yes  []No   NHP  [] Yes  []No   Return to the [employer]  [] Yes  []No   Rehab Therapy  [] Yes  []No   Acute Rehab  [] Yes  []No   Subacute Rehab  [] Yes  []No   Private Care  [] Yes  []No   Substance Abuse Referral  [] Yes  []No   Transportation  [] Yes  []No   Chore Service  [] Yes  []No   Inpatient Hospice  [] Yes  []No   OP RT  [] Yes  [] No   OP Hemo  [] Yes  [] No   OP PT  [] Yes  []No   Support Group  [] Yes  []No   Reach to Recovery  [] Yes  []No   OP Oncology Clinic  [] Yes  []No   Clinic Appointment  [] Yes  []No   DME  [] Yes  []No   Comments    Name of D/C Planner or  Given to Patient or Family Brooke aragon rn cm   Phone Number [phone]        Extension    Date 5/20/17   Time    If you are discharged home, whom do you designate to participate in your discharge plan and receive any information needed?      Enter name of designee         Phone # of designee         Address of designee         Updated         Patient refused to designate any           individual Render In Strict Bullet Format?: No Continue Regimen: Finish terbinafine (pt already has) Detail Level: Zone